# Patient Record
Sex: MALE | Race: WHITE | Employment: FULL TIME | URBAN - METROPOLITAN AREA
[De-identification: names, ages, dates, MRNs, and addresses within clinical notes are randomized per-mention and may not be internally consistent; named-entity substitution may affect disease eponyms.]

---

## 2024-11-01 ENCOUNTER — HOSPITAL ENCOUNTER (INPATIENT)
Age: 69
LOS: 1 days | Discharge: HOME | DRG: 812 | End: 2024-11-02
Attending: EMERGENCY MEDICINE | Admitting: INTERNAL MEDICINE
Payer: COMMERCIAL

## 2024-11-01 ENCOUNTER — APPOINTMENT (OUTPATIENT)
Dept: GENERAL RADIOLOGY | Age: 69
DRG: 812 | End: 2024-11-01
Payer: COMMERCIAL

## 2024-11-01 ENCOUNTER — APPOINTMENT (OUTPATIENT)
Dept: CT IMAGING | Age: 69
DRG: 812 | End: 2024-11-01
Payer: COMMERCIAL

## 2024-11-01 ENCOUNTER — APPOINTMENT (OUTPATIENT)
Dept: ULTRASOUND IMAGING | Age: 69
DRG: 812 | End: 2024-11-01
Payer: COMMERCIAL

## 2024-11-01 DIAGNOSIS — D64.9 ANEMIA, UNSPECIFIED TYPE: ICD-10-CM

## 2024-11-01 DIAGNOSIS — D69.6 THROMBOCYTOPENIA (HCC): Primary | ICD-10-CM

## 2024-11-01 LAB
ALBUMIN SERPL-MCNC: 4.8 G/DL (ref 3.5–5.2)
ALBUMIN/GLOB SERPL: 1.5 {RATIO} (ref 1–2.5)
ALP SERPL-CCNC: 64 U/L (ref 40–129)
ALT SERPL-CCNC: 23 U/L (ref 10–50)
ANION GAP SERPL CALCULATED.3IONS-SCNC: 15 MMOL/L (ref 9–16)
AST SERPL-CCNC: 19 U/L (ref 10–50)
BACTERIA URNS QL MICRO: NORMAL
BASOPHILS # BLD: 0.19 K/UL (ref 0–0.2)
BASOPHILS NFR BLD: 2 % (ref 0–2)
BILIRUB SERPL-MCNC: 0.5 MG/DL (ref 0–1.2)
BILIRUB UR QL STRIP: NEGATIVE
BUN SERPL-MCNC: 28 MG/DL (ref 8–23)
CALCIUM SERPL-MCNC: 9.5 MG/DL (ref 8.6–10.4)
CASTS #/AREA URNS LPF: NORMAL /LPF (ref 0–8)
CELLS COUNTED: 200
CHLORIDE SERPL-SCNC: 104 MMOL/L (ref 98–107)
CLARITY UR: CLEAR
CO2 SERPL-SCNC: 19 MMOL/L (ref 20–31)
COLOR UR: YELLOW
CREAT SERPL-MCNC: 1.2 MG/DL (ref 0.7–1.2)
EOSINOPHIL # BLD: 0.29 K/UL (ref 0–0.4)
EOSINOPHILS RELATIVE PERCENT: 3 % (ref 1–4)
EPI CELLS #/AREA URNS HPF: NORMAL /HPF (ref 0–5)
ERYTHROCYTE [DISTWIDTH] IN BLOOD BY AUTOMATED COUNT: 27.9 % (ref 11.8–14.4)
FERRITIN SERPL-MCNC: 381 NG/ML
FIBRINOGEN PPP-MCNC: 288 MG/DL (ref 203–521)
FOLATE SERPL-MCNC: 23.8 NG/ML (ref 4.8–24.2)
GFR, ESTIMATED: 66 ML/MIN/1.73M2
GLUCOSE SERPL-MCNC: 118 MG/DL (ref 74–99)
GLUCOSE UR STRIP-MCNC: NEGATIVE MG/DL
HAPTOGLOB SERPL-MCNC: 60 MG/DL (ref 30–200)
HCT VFR BLD AUTO: 20.6 % (ref 40.7–50.3)
HCV AB SERPL QL IA: NONREACTIVE
HGB BLD-MCNC: 6.7 G/DL (ref 13–17)
HGB UR QL STRIP.AUTO: NEGATIVE
HIV 1+2 AB+HIV1 P24 AG SERPL QL IA: NONREACTIVE
IMM GRANULOCYTES # BLD AUTO: 0.19 K/UL (ref 0–0.3)
IMM GRANULOCYTES NFR BLD: 2 %
IMM RETICS NFR: 8.8 % (ref 2.7–18.3)
INR PPP: 1.2
IRON SATN MFR SERPL: 35 % (ref 20–55)
IRON SERPL-MCNC: 80 UG/DL (ref 61–157)
KETONES UR STRIP-MCNC: NEGATIVE MG/DL
LDH SERPL-CCNC: 278 U/L (ref 135–225)
LEUKOCYTE ESTERASE UR QL STRIP: ABNORMAL
LYMPHOCYTES NFR BLD: 2.43 K/UL (ref 1–4.8)
LYMPHOCYTES RELATIVE PERCENT: 25 % (ref 24–44)
MCH RBC QN AUTO: 25.5 PG (ref 25.2–33.5)
MCHC RBC AUTO-ENTMCNC: 32.5 G/DL (ref 28.4–34.8)
MCV RBC AUTO: 78.3 FL (ref 82.6–102.9)
MONOCYTES NFR BLD: 0.19 K/UL (ref 0.1–0.8)
MONOCYTES NFR BLD: 2 % (ref 1–7)
MORPHOLOGY: ABNORMAL
NEUTROPHILS NFR BLD: 66 % (ref 36–66)
NEUTS SEG NFR BLD: 6.41 K/UL (ref 1.8–7.7)
NITRITE UR QL STRIP: NEGATIVE
NRBC BLD-RTO: 0 PER 100 WBC
PH UR STRIP: 5.5 [PH] (ref 5–8)
PLATELET # BLD AUTO: ABNORMAL K/UL (ref 138–453)
PLATELET, FLUORESCENCE: 19 K/UL (ref 138–453)
PLATELETS.RETICULATED NFR BLD AUTO: 17.8 % (ref 1.1–10.3)
POTASSIUM SERPL-SCNC: 4.2 MMOL/L (ref 3.7–5.3)
PROT SERPL-MCNC: 7.9 G/DL (ref 6.6–8.7)
PROT UR STRIP-MCNC: NEGATIVE MG/DL
PROTHROMBIN TIME: 15.4 SEC (ref 11.7–14.9)
RBC # BLD AUTO: 2.63 M/UL (ref 4.21–5.77)
RBC #/AREA URNS HPF: NORMAL /HPF (ref 0–4)
RETIC HEMOGLOBIN: 30.4 PG (ref 28.2–35.7)
RETICS # AUTO: <0.01 M/UL (ref 0.03–0.08)
RETICS/RBC NFR AUTO: 0.2 % (ref 0.5–1.9)
SODIUM SERPL-SCNC: 138 MMOL/L (ref 136–145)
SP GR UR STRIP: 1.02 (ref 1–1.03)
TIBC SERPL-MCNC: 231 UG/DL (ref 250–450)
TROPONIN I SERPL HS-MCNC: 9 NG/L (ref 0–22)
UNSATURATED IRON BINDING CAPACITY: 151 UG/DL (ref 112–347)
URATE SERPL-MCNC: 7.5 MG/DL (ref 3.4–7)
UROBILINOGEN UR STRIP-ACNC: NORMAL EU/DL (ref 0–1)
VIT B12 SERPL-MCNC: 555 PG/ML (ref 232–1245)
WBC #/AREA URNS HPF: NORMAL /HPF (ref 0–5)
WBC OTHER # BLD: 9.7 K/UL (ref 3.5–11.3)

## 2024-11-01 PROCEDURE — 70450 CT HEAD/BRAIN W/O DYE: CPT

## 2024-11-01 PROCEDURE — 85610 PROTHROMBIN TIME: CPT

## 2024-11-01 PROCEDURE — 83010 ASSAY OF HAPTOGLOBIN QUANT: CPT

## 2024-11-01 PROCEDURE — 86850 RBC ANTIBODY SCREEN: CPT

## 2024-11-01 PROCEDURE — 84550 ASSAY OF BLOOD/URIC ACID: CPT

## 2024-11-01 PROCEDURE — 84484 ASSAY OF TROPONIN QUANT: CPT

## 2024-11-01 PROCEDURE — 85055 RETICULATED PLATELET ASSAY: CPT

## 2024-11-01 PROCEDURE — 30233N1 TRANSFUSION OF NONAUTOLOGOUS RED BLOOD CELLS INTO PERIPHERAL VEIN, PERCUTANEOUS APPROACH: ICD-10-PCS | Performed by: EMERGENCY MEDICINE

## 2024-11-01 PROCEDURE — 85045 AUTOMATED RETICULOCYTE COUNT: CPT

## 2024-11-01 PROCEDURE — 82746 ASSAY OF FOLIC ACID SERUM: CPT

## 2024-11-01 PROCEDURE — 83615 LACTATE (LD) (LDH) ENZYME: CPT

## 2024-11-01 PROCEDURE — 86900 BLOOD TYPING SEROLOGIC ABO: CPT

## 2024-11-01 PROCEDURE — 82728 ASSAY OF FERRITIN: CPT

## 2024-11-01 PROCEDURE — 81001 URINALYSIS AUTO W/SCOPE: CPT

## 2024-11-01 PROCEDURE — 71046 X-RAY EXAM CHEST 2 VIEWS: CPT

## 2024-11-01 PROCEDURE — 83540 ASSAY OF IRON: CPT

## 2024-11-01 PROCEDURE — 99223 1ST HOSP IP/OBS HIGH 75: CPT | Performed by: INTERNAL MEDICINE

## 2024-11-01 PROCEDURE — 83550 IRON BINDING TEST: CPT

## 2024-11-01 PROCEDURE — 87389 HIV-1 AG W/HIV-1&-2 AB AG IA: CPT

## 2024-11-01 PROCEDURE — 93005 ELECTROCARDIOGRAM TRACING: CPT

## 2024-11-01 PROCEDURE — 99285 EMERGENCY DEPT VISIT HI MDM: CPT

## 2024-11-01 PROCEDURE — 86923 COMPATIBILITY TEST ELECTRIC: CPT

## 2024-11-01 PROCEDURE — P9016 RBC LEUKOCYTES REDUCED: HCPCS

## 2024-11-01 PROCEDURE — 88185 FLOWCYTOMETRY/TC ADD-ON: CPT

## 2024-11-01 PROCEDURE — 85384 FIBRINOGEN ACTIVITY: CPT

## 2024-11-01 PROCEDURE — 6360000002 HC RX W HCPCS: Performed by: INTERNAL MEDICINE

## 2024-11-01 PROCEDURE — 86803 HEPATITIS C AB TEST: CPT

## 2024-11-01 PROCEDURE — 85025 COMPLETE CBC W/AUTO DIFF WBC: CPT

## 2024-11-01 PROCEDURE — 86038 ANTINUCLEAR ANTIBODIES: CPT

## 2024-11-01 PROCEDURE — 99222 1ST HOSP IP/OBS MODERATE 55: CPT | Performed by: PHYSICIAN ASSISTANT

## 2024-11-01 PROCEDURE — 86225 DNA ANTIBODY NATIVE: CPT

## 2024-11-01 PROCEDURE — 76705 ECHO EXAM OF ABDOMEN: CPT

## 2024-11-01 PROCEDURE — 86901 BLOOD TYPING SEROLOGIC RH(D): CPT

## 2024-11-01 PROCEDURE — 2060000000 HC ICU INTERMEDIATE R&B

## 2024-11-01 PROCEDURE — 82607 VITAMIN B-12: CPT

## 2024-11-01 PROCEDURE — 88184 FLOWCYTOMETRY/ TC 1 MARKER: CPT

## 2024-11-01 PROCEDURE — 2580000003 HC RX 258: Performed by: PHYSICIAN ASSISTANT

## 2024-11-01 PROCEDURE — 80053 COMPREHEN METABOLIC PANEL: CPT

## 2024-11-01 RX ORDER — SODIUM CHLORIDE 9 MG/ML
INJECTION, SOLUTION INTRAVENOUS PRN
Status: DISCONTINUED | OUTPATIENT
Start: 2024-11-01 | End: 2024-11-02 | Stop reason: HOSPADM

## 2024-11-01 RX ORDER — ONDANSETRON 4 MG/1
4 TABLET, ORALLY DISINTEGRATING ORAL EVERY 8 HOURS PRN
Status: DISCONTINUED | OUTPATIENT
Start: 2024-11-01 | End: 2024-11-02 | Stop reason: HOSPADM

## 2024-11-01 RX ORDER — POTASSIUM CHLORIDE 1500 MG/1
40 TABLET, EXTENDED RELEASE ORAL PRN
Status: DISCONTINUED | OUTPATIENT
Start: 2024-11-01 | End: 2024-11-02 | Stop reason: HOSPADM

## 2024-11-01 RX ORDER — POLYETHYLENE GLYCOL 3350 17 G/17G
17 POWDER, FOR SOLUTION ORAL DAILY PRN
Status: DISCONTINUED | OUTPATIENT
Start: 2024-11-01 | End: 2024-11-02 | Stop reason: HOSPADM

## 2024-11-01 RX ORDER — ACETAMINOPHEN 325 MG/1
650 TABLET ORAL EVERY 6 HOURS PRN
Status: DISCONTINUED | OUTPATIENT
Start: 2024-11-01 | End: 2024-11-02 | Stop reason: HOSPADM

## 2024-11-01 RX ORDER — SODIUM CHLORIDE 0.9 % (FLUSH) 0.9 %
5-40 SYRINGE (ML) INJECTION EVERY 12 HOURS SCHEDULED
Status: DISCONTINUED | OUTPATIENT
Start: 2024-11-01 | End: 2024-11-02 | Stop reason: HOSPADM

## 2024-11-01 RX ORDER — ACETAMINOPHEN 650 MG/1
650 SUPPOSITORY RECTAL EVERY 6 HOURS PRN
Status: DISCONTINUED | OUTPATIENT
Start: 2024-11-01 | End: 2024-11-02 | Stop reason: HOSPADM

## 2024-11-01 RX ORDER — SODIUM CHLORIDE 0.9 % (FLUSH) 0.9 %
5-40 SYRINGE (ML) INJECTION PRN
Status: DISCONTINUED | OUTPATIENT
Start: 2024-11-01 | End: 2024-11-02 | Stop reason: HOSPADM

## 2024-11-01 RX ORDER — DEXAMETHASONE 4 MG/1
40 TABLET ORAL DAILY
Status: DISCONTINUED | OUTPATIENT
Start: 2024-11-01 | End: 2024-11-02 | Stop reason: HOSPADM

## 2024-11-01 RX ORDER — MAGNESIUM SULFATE IN WATER 40 MG/ML
2000 INJECTION, SOLUTION INTRAVENOUS PRN
Status: DISCONTINUED | OUTPATIENT
Start: 2024-11-01 | End: 2024-11-02 | Stop reason: HOSPADM

## 2024-11-01 RX ORDER — ONDANSETRON 2 MG/ML
4 INJECTION INTRAMUSCULAR; INTRAVENOUS EVERY 6 HOURS PRN
Status: DISCONTINUED | OUTPATIENT
Start: 2024-11-01 | End: 2024-11-02 | Stop reason: HOSPADM

## 2024-11-01 RX ORDER — POTASSIUM CHLORIDE 7.45 MG/ML
10 INJECTION INTRAVENOUS PRN
Status: DISCONTINUED | OUTPATIENT
Start: 2024-11-01 | End: 2024-11-02 | Stop reason: HOSPADM

## 2024-11-01 RX ADMIN — SODIUM CHLORIDE, PRESERVATIVE FREE 10 ML: 5 INJECTION INTRAVENOUS at 23:17

## 2024-11-01 RX ADMIN — DEXAMETHASONE 40 MG: 4 TABLET ORAL at 23:18

## 2024-11-01 ASSESSMENT — ENCOUNTER SYMPTOMS
VOMITING: 0
NAUSEA: 0
SHORTNESS OF BREATH: 1
ABDOMINAL PAIN: 0
COUGH: 0

## 2024-11-01 ASSESSMENT — PAIN - FUNCTIONAL ASSESSMENT: PAIN_FUNCTIONAL_ASSESSMENT: NONE - DENIES PAIN

## 2024-11-01 NOTE — ED PROVIDER NOTES
[facility] 4B STEPDOWN  Emergency Department Encounter  Emergency Medicine Resident     Pt Name:Osbaldo Duff  MRN: [id number]  Birthdate 1955  Date of evaluation: 11/1/24  PCP:  No primary care provider on file.  Note Started: 5:40 PM EDT      CHIEF COMPLAINT       Chief Complaint   Patient presents with    Fatigue       HISTORY OF PRESENT ILLNESS  (Location/Symptom, Timing/Onset, Context/Setting, Quality, Duration, Modifying Factors, Severity.)      Osbaldo Duff is a 69 y.o. male who presents with concern for fatigue, generalized malaise, shortness of breath for the past week.  Patient states that he has been trying over-the-counter cold and flu medication for his symptoms, which have not helped.  He states that he went to a [facility] physician today as a new patient, where he was found to have a hemoglobin of 6.5 and also thrombocytopenia with a platelet count of 18.  Patient denies any history of this.  Aside from his feelings of generalized malaise and exertional shortness of breath, he denies any other symptoms.  He also denies any source of bleeding, denies melena or hematochezia.  Denies any abdominal pain.  Denies any hematemesis or hemoptysis.  He denies any chest pain or shortness of breath.  He denies any known medical conditions, however he has not seen a physician in over 15 years.    PAST MEDICAL / SURGICAL / SOCIAL / FAMILY HISTORY      has no past medical history on file.       has no past surgical history on file.      Social History     Socioeconomic History    Marital status:      Spouse name: Not on file    Number of children: Not on file    Years of education: Not on file    Highest education level: Not on file   Occupational History    Not on file   Tobacco Use    Smoking status: Never    Smokeless tobacco: Never   Substance and Sexual Activity    Alcohol use: Never    Drug use: Never    Sexual activity: Yes   Other Topics Concern    Not on file   Social History Narrative    Not  MEDICATIONS:  Current Discharge Medication List          Shannon Mcguire MD  Emergency Medicine Resident    (Please note that portions of thisnote were completed with a voice recognition program.  Efforts were made to edit the dictations but occasionally words are mis-transcribed.)

## 2024-11-01 NOTE — ED NOTES
Pt to ed with family from home.   Pt is Brazilian speaking.  Pt had been c/o fatigue, headache for the past 7 days.   Pt denies chest pain, sob, difficulty breathing, abdominal pain, abnormal bleeding.   Pt went to a [place] physician today, had routine lab work. Pt hemoglobin was 6.5 and platelets 18. Pt was instructed to come to ED for blood transfusion.   Pt is pale. Pt is alert, oriented, speaking in full, complete sentences.   Pt has not been evaluated by doctor in 10 yrs, takes no prescribed medications.

## 2024-11-01 NOTE — CONSULTS
Today's Date: 11/1/2024  Patient Name: Osbaldo Duff  Date of admission: 11/1/2024  5:40 PM  Patient's age: 69 y.o., 1955  Admission Dx: No admission diagnoses are documented for this encounter.    Reason for Consult: management recommendations  Requesting Physician: No admitting provider for patient encounter.    CHIEF COMPLAINT: Fatigue.  Abnormal cell counts    History Obtained From:  patient, family member -daughter, electronic medical record    HISTORY OF PRESENT ILLNESS:      The patient is a 69 y.o. [place] male who admitted to the hospital due to abnormal lab workup.  History was obtained through an .  Patient understand very limited English.  Patient has not seen a doctor for a while.  Was seen by primary care provider due to complaints of fatigue also having low-grade fever.  Lab workup showed significantly abnormalities including severe thrombocytopenia and anemia subsequently patient sent to the ER.    Patient denies any weight loss swollen glands.  Does complain of having viral syndrome like symptoms over the last 1 week.  Denies noticing any bleeding.  Denies cough.  Patient does not smoke.  Drinks alcohol rarely.    Past Medical History: Hypertension    Past Surgical History:  No known pertinent surgical history    Medications:    Prior to Admission medications    Medication Sig Start Date End Date Taking? Authorizing Provider   Multiple Vitamin (MULTIVITAMIN ADULT PO) Take by mouth   Yes ProviderDexter MD     Current Facility-Administered Medications   Medication Dose Route Frequency Provider Last Rate Last Admin    0.9 % sodium chloride infusion   IntraVENous PRN Shannon Mcguire MD         Current Outpatient Medications   Medication Sig Dispense Refill    Multiple Vitamin (MULTIVITAMIN ADULT PO) Take by mouth         Allergies:  Patient has no known allergies.    Social History:   reports that he has never smoked. He has never used smokeless tobacco. He reports that he  patient.          Jack Gallego MD          This note is created with the assistance of a speech recognition program.  While intending to generate a document that actually reflects the content of the visit, the document can still have some errors including those of syntax and sound a like substitutions which may escape proof reading.  It such instances, actual meaning can be extrapolated by contextual diversion.

## 2024-11-01 NOTE — ED NOTES
Patient ambulatory to and from restroom with steady gait. Patient denies any dizziness, chest pain, shortness of breath with ambulation.

## 2024-11-01 NOTE — ED PROVIDER NOTES
[facility]  Emergency Department  Faculty Attestation     I performed a history and physical examination of the patient and discussed management with the resident. I reviewed the resident’s note and agree with the documented findings and plan of care. Any areas of disagreement are noted on the chart. I was personally present for the key portions of any procedures. I have documented in the chart those procedures where I was not present during the key portions. I have reviewed the emergency nurses triage note. I agree with the chief complaint, past medical history, past surgical history, allergies, medications, social and family history as documented unless otherwise noted below.    For Physician Assistant/ Nurse Practitioner cases/documentation I have personally evaluated this patient and have completed at least one if not all key elements of the E/M (history, physical exam, and MDM). Additional findings are as noted.    Preliminary note started at 6:01 PM EDT    Primary Care Physician:  No primary care provider on file.    Screenings:  [unfilled]    CHIEF COMPLAINT     No chief complaint on file.      RECENT VITALS:   There were no vitals taken for this visit.    LABS:  Labs Reviewed - No data to display    Radiology  No orders to display       CRITICAL CARE: There was a high probability of clinically significant/life threatening deterioration in this patient's condition which required my urgent intervention.  Total critical care time was none minutes.  This excludes any time for separately reportable procedures.     EKG:  EKG Interpretation    Interpreted by me    Rhythm: normal sinus   Rate: normal  Axis: normal  Ectopy: none  Conduction: normal  ST Segments: Subtle ST depression inferolaterally  T Waves: no acute change  Q Waves: none    Clinical Impression: Nonspecific ST changes    Attending Physician Additional  Notes    Patient is had at least 1 week if not more of

## 2024-11-01 NOTE — ED NOTES
Blood bank called requesting additional pink top tube with regular patient label for further type and screening. New pink top obtained, labeled, and sent to lab via tube system.

## 2024-11-02 VITALS
SYSTOLIC BLOOD PRESSURE: 130 MMHG | HEIGHT: 71 IN | WEIGHT: 180.56 LBS | DIASTOLIC BLOOD PRESSURE: 78 MMHG | TEMPERATURE: 98.1 F | HEART RATE: 84 BPM | BODY MASS INDEX: 25.28 KG/M2 | RESPIRATION RATE: 21 BRPM | OXYGEN SATURATION: 93 %

## 2024-11-02 LAB
ANION GAP SERPL CALCULATED.3IONS-SCNC: 12 MMOL/L (ref 9–16)
BASOPHILS # BLD: 0 K/UL (ref 0–0.2)
BASOPHILS NFR BLD: 0 % (ref 0–2)
BUN SERPL-MCNC: 25 MG/DL (ref 8–23)
CALCIUM SERPL-MCNC: 9.3 MG/DL (ref 8.6–10.4)
CHLORIDE SERPL-SCNC: 107 MMOL/L (ref 98–107)
CO2 SERPL-SCNC: 17 MMOL/L (ref 20–31)
CREAT SERPL-MCNC: 1.2 MG/DL (ref 0.7–1.2)
EKG ATRIAL RATE: 85 BPM
EKG P AXIS: 46 DEGREES
EKG P-R INTERVAL: 130 MS
EKG Q-T INTERVAL: 364 MS
EKG QRS DURATION: 86 MS
EKG QTC CALCULATION (BAZETT): 433 MS
EKG R AXIS: 61 DEGREES
EKG T AXIS: 64 DEGREES
EKG VENTRICULAR RATE: 85 BPM
EOSINOPHIL # BLD: 0 K/UL (ref 0–0.4)
EOSINOPHILS RELATIVE PERCENT: 0 % (ref 1–4)
ERYTHROCYTE [DISTWIDTH] IN BLOOD BY AUTOMATED COUNT: 24.1 % (ref 11.8–14.4)
GFR, ESTIMATED: 66 ML/MIN/1.73M2
GLUCOSE SERPL-MCNC: 191 MG/DL (ref 74–99)
HCT VFR BLD AUTO: 20.8 % (ref 40.7–50.3)
HCT VFR BLD AUTO: 25.5 % (ref 40.7–50.3)
HCT VFR BLD AUTO: 27.2 % (ref 40.7–50.3)
HGB BLD-MCNC: 6.8 G/DL (ref 13–17)
HGB BLD-MCNC: 8.6 G/DL (ref 13–17)
HGB BLD-MCNC: 8.7 G/DL (ref 13–17)
IMM GRANULOCYTES # BLD AUTO: 0.25 K/UL (ref 0–0.3)
IMM GRANULOCYTES NFR BLD: 3 %
LYMPHOCYTES NFR BLD: 0.58 K/UL (ref 1–4.8)
LYMPHOCYTES RELATIVE PERCENT: 7 % (ref 24–44)
MCH RBC QN AUTO: 26 PG (ref 25.2–33.5)
MCHC RBC AUTO-ENTMCNC: 32 G/DL (ref 28.4–34.8)
MCV RBC AUTO: 81.2 FL (ref 82.6–102.9)
MONOCYTES NFR BLD: 0.08 K/UL (ref 0.1–0.8)
MONOCYTES NFR BLD: 1 % (ref 1–7)
MORPHOLOGY: ABNORMAL
NEUTROPHILS NFR BLD: 89 % (ref 36–66)
NEUTS SEG NFR BLD: 7.39 K/UL (ref 1.8–7.7)
NRBC BLD-RTO: 0 PER 100 WBC
PLATELET # BLD AUTO: ABNORMAL K/UL (ref 138–453)
PLATELET, FLUORESCENCE: 16 K/UL (ref 138–453)
PLATELETS.RETICULATED NFR BLD AUTO: 16.5 % (ref 1.1–10.3)
POTASSIUM SERPL-SCNC: 4.3 MMOL/L (ref 3.7–5.3)
RBC # BLD AUTO: 3.35 M/UL (ref 4.21–5.77)
SODIUM SERPL-SCNC: 136 MMOL/L (ref 136–145)
WBC OTHER # BLD: 8.3 K/UL (ref 3.5–11.3)

## 2024-11-02 PROCEDURE — 99233 SBSQ HOSP IP/OBS HIGH 50: CPT | Performed by: INTERNAL MEDICINE

## 2024-11-02 PROCEDURE — 85025 COMPLETE CBC W/AUTO DIFF WBC: CPT

## 2024-11-02 PROCEDURE — 93010 ELECTROCARDIOGRAM REPORT: CPT | Performed by: INTERNAL MEDICINE

## 2024-11-02 PROCEDURE — 85018 HEMOGLOBIN: CPT

## 2024-11-02 PROCEDURE — 36415 COLL VENOUS BLD VENIPUNCTURE: CPT

## 2024-11-02 PROCEDURE — 85055 RETICULATED PLATELET ASSAY: CPT

## 2024-11-02 PROCEDURE — 85014 HEMATOCRIT: CPT

## 2024-11-02 PROCEDURE — 36430 TRANSFUSION BLD/BLD COMPNT: CPT

## 2024-11-02 PROCEDURE — 99238 HOSP IP/OBS DSCHRG MGMT 30/<: CPT | Performed by: STUDENT IN AN ORGANIZED HEALTH CARE EDUCATION/TRAINING PROGRAM

## 2024-11-02 PROCEDURE — 80048 BASIC METABOLIC PNL TOTAL CA: CPT

## 2024-11-02 PROCEDURE — 2580000003 HC RX 258: Performed by: PHYSICIAN ASSISTANT

## 2024-11-02 PROCEDURE — 6360000002 HC RX W HCPCS: Performed by: INTERNAL MEDICINE

## 2024-11-02 PROCEDURE — P9016 RBC LEUKOCYTES REDUCED: HCPCS

## 2024-11-02 RX ORDER — DEXAMETHASONE 0.5 MG/1
4 TABLET ORAL 2 TIMES DAILY
Qty: 112 TABLET | Refills: 0 | Status: SHIPPED | OUTPATIENT
Start: 2024-11-02 | End: 2024-11-09

## 2024-11-02 RX ORDER — SODIUM CHLORIDE 9 MG/ML
INJECTION, SOLUTION INTRAVENOUS PRN
Status: DISCONTINUED | OUTPATIENT
Start: 2024-11-02 | End: 2024-11-02 | Stop reason: HOSPADM

## 2024-11-02 RX ADMIN — DEXAMETHASONE 40 MG: 4 TABLET ORAL at 07:51

## 2024-11-02 RX ADMIN — SODIUM CHLORIDE, PRESERVATIVE FREE 10 ML: 5 INJECTION INTRAVENOUS at 07:54

## 2024-11-02 ASSESSMENT — ENCOUNTER SYMPTOMS
GASTROINTESTINAL NEGATIVE: 1
RESPIRATORY NEGATIVE: 1

## 2024-11-02 NOTE — DISCHARGE SUMMARY
[facility]  Office: [phone]  Carlos Ramírez DO, Jerry Washington DO, Keo Esquivel DO, Matthieu Steven DO, Janet Barr MD, Ivy Rome MD, Kimberlyn Jenkins MD, Deborah Dunbar MD,  Nick Garcia MD, Carlton Roth MD, Hellen Segura MD,  Cherry Dee DO, Carlos Doe MD, Cullen Damon MD, Alban Ramírez DO, Carol Wyatt MD,  Andrew Plata DO, Juanis Lees MD, Hui Laughlin MD, Mira Gee MD, Sonia Escobedo MD,  Gil Wheat MD, Chi Bang MD, Refugio Elliott MD, Livia Hinojosa MD, Sumit Michel MD, Mika Benjamin MD, Presley Hughes DO, Billy Pederson MD, Shirley Waterhouse, CNP,  Corin Kennedy, CNP, Presley Billy, CNP,  Sia Hancock, BESSY, Grace Romo, CNP, Nicole Vargas, CNP, Carolynn Mckinney, CNP, Clau Russell, CNP, Faye Siu PA-C, Elena Rosen PA-C, Mandy Juárez, CNP, Mc Woo, CNP,  Lupe Ramirez, CNP, Valeria Tai, CNP,  Niru Man, CNP, Janessa Carter, CNP         [facility]   IN-PATIENT SERVICE   [facility]    Discharge Summary     Patient ID: Osbaldo Duff  :  1955   MRN: [id number]     ACCOUNT:  [id number]   Patient's PCP: No primary care provider on file.  Admit Date: 2024   Discharge Date: 2024   Length of Stay: 1  Code Status:  Full Code  Admitting Physician: Matthieu Steven DO  Discharge Physician: Livia Hinojosa MD     Active Discharge Diagnoses:     Hospital Problem Lists:  Principal Problem:    Acute anemia  Active Problems:    Thrombocytopenia (HCC)  Resolved Problems:    * No resolved hospital problems. *      Admission Condition:  poor     Discharged Condition: good    Hospital Stay:     Hospital Course:     69 y.o. Non- / non  male who presents with Fatigue and is admitted to the hospital for the management of Acute anemia. Patient with no known medical history.      Information obtained via interpretor. Patient reports fatigue, general malaise,    dexAMETHasone 0.5 MG tablet         No discharge procedures on file.    Time Spent on discharge is  20 mins in patient examination, evaluation, counseling as well as medication reconciliation, prescriptions for required medications, discharge plan and follow up.    Electronically signed by   Livia Hinojosa MD  11/2/2024  4:20 PM      Thank you Dr. Sherman primary care provider on file. for the opportunity to be involved in this patient's care.

## 2024-11-02 NOTE — PROGRESS NOTES
0624. Writer notified Dr. Jack Gallego and NP Niru Man regarding patient's platelet this morning of 16. Patient has no active bleeding noted. Vitals were stable. Awaiting response.

## 2024-11-02 NOTE — ED NOTES
ED to inpatient nurses report      Chief Complaint:  Chief Complaint   Patient presents with    Fatigue     Present to ED from: home    MOA:     LOC: alert and orientated to name, place, date  Mobility: Requires assistance * 1  Oxygen Baseline: 100    Current needs required: none   Pending ED orders: none  Present condition: stable    Why did the patient come to the ED?     Pt to ed with family from home.   Pt is Belgian speaking.  Pt had been c/o fatigue, headache for the past 7 days.   Pt denies chest pain, sob, difficulty breathing, abdominal pain, abnormal bleeding.   Pt went to a [place] physician today, had routine lab work. Pt hemoglobin was 6.5 and platelets 18. Pt was instructed to come to ED for blood transfusion.   Pt is pale. Pt is alert, oriented, speaking in full, complete sentences.   Pt has not been evaluated by doctor in 10 yrs, takes no prescribed medications.      What is the plan? Admit for thrombocytopenia and Anemia  Any procedures or intervention occur? Meds, labs, imaging  Any safety concerns??    Mental Status:  Level of Consciousness: Alert (0)    Psych Assessment:   Psychosocial  Psychosocial (WDL): Within Defined Limits  Vital signs   Vitals:    11/01/24 2020 11/01/24 2025 11/01/24 2030 11/01/24 2035   BP: 138/74 137/68 138/67 (!) 145/73   Pulse: 81 81 80 82   Resp:   21 22   Temp:  98.3 °F (36.8 °C) 98.4 °F (36.9 °C) 98.9 °F (37.2 °C)   TempSrc:       SpO2: 100% 100% 100% 100%   Weight:       Height:            Vitals:  Patient Vitals for the past 24 hrs:   BP Temp Temp src Pulse Resp SpO2 Height Weight   11/01/24 2035 (!) 145/73 98.9 °F (37.2 °C) -- 82 22 100 % -- --   11/01/24 2030 138/67 98.4 °F (36.9 °C) -- 80 21 100 % -- --   11/01/24 2025 137/68 98.3 °F (36.8 °C) -- 81 -- 100 % -- --   11/01/24 2020 138/74 -- -- 81 -- 100 % -- --   11/01/24 2017 (!) 138/59 98.2 °F (36.8 °C) -- 82 18 100 % -- --   11/01/24 1845 139/77 -- -- 79 -- 100 % -- --   11/01/24 1842 -- -- -- 85 -- 100 % --

## 2024-11-02 NOTE — PROGRESS NOTES
[facility]  Office: [phone]  Carlos Ramírez DO, Jerry Washington DO, Keo Esquivel DO, Matthieu Steven DO, Janet Barr MD, Ivy Rome MD, Kimberlyn Jenkins MD, Deborah Dunbar MD,  Nick Garcia MD, Carlton Roth MD, Hellen Segura MD,  Cherry Dee DO, Carlos Doe MD, Cullen Damon MD, Alban Ramírez DO, Carol Wyatt MD,  Andrew Plata DO, Juanis Lees MD, Hui Laughlin MD, Mira Gee MD, Sonia Escobedo MD,  Gil Wheat MD, Chi Bang MD, Refugio Elliott MD, Livia Hinojosa MD, Sumit Michel MD, Mika Benjamin MD, Presley Hughes DO, Billy Pederson MD, Shirley Waterhouse, CNP,  Corin Kennedy CNP, Presley Billy, JIMENEZ,  Sia Hancock, BESSY, Grace Romo, CNP, Nicole Vargas, CNP, Carolynn Mckinney, CNP, Clau Russell, CNP, GÉNESIS ZambranoC, GÉNESIS UpC, Mandy Juárez, CNP, Mc Woo, CNP,  Lupe Ramirez, CNP, Valeria Tai, CNP,  Niru Man, CNP, Janessa Carter, CNP         [facility]   IN-PATIENT SERVICE   [facility]    Progress Note    11/2/2024    12:05 PM    Name:   Osbaldo Duff  MRN:     [id number]     Acct:      [id number]   Room:   0417/0417-02   Day:  1  Admit Date:  11/1/2024  5:40 PM    PCP:   No primary care provider on file.  Code Status:  Full Code    Subjective:     C/C:   Chief Complaint   Patient presents with    Fatigue     Interval History Status: not changed.   Patient was seen and examined, at time of my evaluation his daughter and wife at bedside, his daughter help with translation, at time of my evaluation patient continues to report fatigue, weakness however he denies any complaint, he denies any active bleeding  Lab vital signs reviewed, discussed with the patient and his family      Review of Systems:     Review of Systems   Constitutional:  Positive for fatigue.   HENT: Negative.     Respiratory: Negative.     Cardiovascular: Negative.    Gastrointestinal: Negative.    Genitourinary:  present.      Mental Status: He is alert and oriented to person, place, and time.      Sensory: No sensory deficit.      Motor: No weakness.   Psychiatric:         Mood and Affect: Mood normal.         Assessment:        Hospital Problems             Last Modified POA    * (Principal) Acute anemia 11/1/2024 Yes    Thrombocytopenia (HCC) 11/1/2024 Yes       Plan:        Severe thrombocytopenia present on admission, and patient was started on steroid Decadron, hematology oncology is following appreciate input, will continue to monitor platelet will consider transfuse platelets if patient has actively bleeding and if platelets below 10,  Anemia, status post 1 unit blood transfusion, hemoglobin stable today with last check 8.7 will continue to monitor and will transfuse hemoglobin less than 7  Compression device for DVT prophylaxis  Will continue to monitor and will follow final hematology oncology input  Discussed with the patient and with his family daughter and wife at bedside    Livia Hinojosa MD  11/2/2024  12:05 PM

## 2024-11-02 NOTE — ED NOTES
The following labs were labeled with appropriate pt sticker and tubed to lab:     [] Blue     [x] Lavender   [] on ice  [x] Green/yellow  [x] Green/black [x] on ice  [] Grey  [] on ice  [x] Yellow  [] Red  [] Pink  [] Type/ Screen  [] ABG  [] VBG    [] COVID-19 swab    [] Rapid  [] PCR  [] Flu swab  [] Peds Viral Panel     [] Urine Sample  [] Fecal Sample  [] Pelvic Cultures  [] Blood Cultures  [] X 2  [] STREP Cultures  [] Wound Cultures

## 2024-11-02 NOTE — PROGRESS NOTES
2300. Patient's daughter refused Covid 19 rapid to his father because she stated that they tested her father at home for covid yesterday and resulted as negative. Writer notified MOOSE Carter,no new order made.

## 2024-11-02 NOTE — DISCHARGE INSTRUCTIONS
Please continue to take your medication as prescribed, follow-up with hematology oncology doctor after discharge to recheck your platelet and hemoglobin level and for further workup

## 2024-11-02 NOTE — H&P
Basophils % 2 0 - 2 %    Immature Granulocytes Absolute 0.19 0.00 - 0.30 k/uL    Neutrophils Absolute 6.41 1.8 - 7.7 k/uL    Lymphocytes Absolute 2.43 1.0 - 4.8 k/uL    Monocytes Absolute 0.19 0.1 - 0.8 k/uL    Eosinophils Absolute 0.29 0.0 - 0.4 k/uL    Basophils Absolute 0.19 0.0 - 0.2 k/uL    Cells Counted 200     Morphology 1+ SCHISTOCYTES     Morphology 1+ ELLIPTOCYTES     Morphology ANISOCYTOSIS PRESENT     Morphology HYPOCHROMIA PRESENT    CMP    Collection Time: 11/01/24  6:25 PM   Result Value Ref Range    Sodium 138 136 - 145 mmol/L    Potassium 4.2 3.7 - 5.3 mmol/L    Chloride 104 98 - 107 mmol/L    CO2 19 (L) 20 - 31 mmol/L    Anion Gap 15 9 - 16 mmol/L    Glucose 118 (H) 74 - 99 mg/dL    BUN 28 (H) 8 - 23 mg/dL    Creatinine 1.2 0.7 - 1.2 mg/dL    Est, Glom Filt Rate 66 >60 mL/min/1.73m2    Calcium 9.5 8.6 - 10.4 mg/dL    Total Protein 7.9 6.6 - 8.7 g/dL    Albumin 4.8 3.5 - 5.2 g/dL    Albumin/Globulin Ratio 1.5 1.0 - 2.5    Total Bilirubin 0.5 0.0 - 1.2 mg/dL    Alkaline Phosphatase 64 40 - 129 U/L    ALT 23 10 - 50 U/L    AST 19 10 - 50 U/L   Troponin    Collection Time: 11/01/24  6:25 PM   Result Value Ref Range    Troponin, High Sensitivity 9 0 - 22 ng/L   Protime-INR    Collection Time: 11/01/24  6:25 PM   Result Value Ref Range    Protime 15.4 (H) 11.7 - 14.9 sec    INR 1.2    Iron and TIBC    Collection Time: 11/01/24  6:25 PM   Result Value Ref Range    Iron 80 61 - 157 ug/dL    TIBC 231 (L) 250 - 450 ug/dL    Iron % Saturation 35 20 - 55 %    UIBC 151 112 - 347 ug/dL   Ferritin    Collection Time: 11/01/24  6:25 PM   Result Value Ref Range    Ferritin 381 ng/mL   Fibrinogen    Collection Time: 11/01/24  6:25 PM   Result Value Ref Range    Fibrinogen 288 203 - 521 mg/dL   Lactate Dehydrogenase    Collection Time: 11/01/24  6:25 PM   Result Value Ref Range     (H) 135 - 225 U/L   Uric Acid    Collection Time: 11/01/24  6:25 PM   Result Value Ref Range    Uric Acid 7.5 (H) 3.4 - 7.0  mg/dL   Reticulocytes    Collection Time: 11/01/24  6:25 PM   Result Value Ref Range    Retic Ct Pct 0.2 (L) 0.5 - 1.9 %    Absolute Retic # <0.010 (L) 0.030 - 0.080 M/uL    Immature Retic Fract 8.8 2.7 - 18.3 %    Retic Hemoglobin 30.4 28.2 - 35.7 pg   TYPE AND SCREEN    Collection Time: 11/01/24  6:26 PM   Result Value Ref Range    Blood Bank Sample Expiration 11/04/2024,2359     Arm Band Number [id number]     ABO/Rh O POSITIVE     Antibody Screen NEGATIVE     Unit Number [id number]     Component Leukocyte Reduced Red Cell     Unit Divison 00     Dispense Status Blood Bank ISSUED     Unit Issue Date/Time 202411012008     Product Code Blood Bank [id number]     Blood Bank Unit Type and Rh O POS     Blood Bank ISBT Product Blood Type 5100     Blood Bank Blood Product Expiration Date 822733616351     Transfusion Status OK TO TRANSFUSE     Crossmatch Result COMPATIBLE    Urinalysis with Reflex to Culture    Collection Time: 11/01/24  7:20 PM    Specimen: Urine   Result Value Ref Range    Color, UA Yellow Yellow    Turbidity UA Clear Clear    Glucose, Ur NEGATIVE NEGATIVE mg/dL    Bilirubin, Urine NEGATIVE NEGATIVE    Ketones, Urine NEGATIVE NEGATIVE mg/dL    Specific Gravity, UA 1.017 1.005 - 1.030    Urine Hgb NEGATIVE NEGATIVE    pH, Urine 5.5 5.0 - 8.0    Protein, UA NEGATIVE NEGATIVE mg/dL    Urobilinogen, Urine Normal 0.0 - 1.0 EU/dL    Nitrite, Urine NEGATIVE NEGATIVE    Leukocyte Esterase, Urine TRACE (A) NEGATIVE   Microscopic Urinalysis    Collection Time: 11/01/24  7:20 PM   Result Value Ref Range    WBC, UA 2 TO 5 0 - 5 /HPF    RBC, UA None 0 - 4 /HPF    Casts UA  0 - 8 /LPF     None Reference range defined for non-centrifuged specimen.    Epithelial Cells, UA 0 TO 2 0 - 5 /HPF    Bacteria, UA None None       Imaging/Diagnostics:  CT HEAD WO CONTRAST    Result Date: 11/1/2024  1.  No acute intracranial hemorrhage or acute cerebral infarction identified. 2.  Complete opacification of left maxillary sinus.

## 2024-11-02 NOTE — CONSENT
Informed Consent for Blood Component Transfusion Note    Using a bedside video , I have discussed with the patient the rationale for blood component transfusion; its benefits in treating or preventing fatigue, organ damage, or death; and its risk which includes mild transfusion reactions, rare risk of blood borne infection, or more serious but rare reactions. I have discussed the alternatives to transfusion, including the risk and consequences of not receiving transfusion. The patient had an opportunity to ask questions and had agreed to proceed with transfusion of blood components.    Electronically signed by Shannon Mcguire MD on 11/1/24 at 8:50 PM EDT

## 2024-11-02 NOTE — PLAN OF CARE
Problem: Discharge Planning  Goal: Discharge to home or other facility with appropriate resources  11/2/2024 0917 by Kristan Wilkins, RN  Outcome: Progressing  Flowsheets (Taken 11/2/2024 0745)  Discharge to home or other facility with appropriate resources:   Arrange for needed discharge resources and transportation as appropriate   Identify barriers to discharge with patient and caregiver   Identify discharge learning needs (meds, wound care, etc)   Arrange for interpreters to assist at discharge as needed   Refer to discharge planning if patient needs post-hospital services based on physician order or complex needs related to functional status, cognitive ability or social support system  11/1/2024 1937 by Ivette Wilcox, RN  Outcome: Progressing

## 2024-11-02 NOTE — PROGRESS NOTES
Today's Date: 11/2/2024  Patient Name: Osbaldo Duff  Date of admission: 11/1/2024  5:40 PM  Patient's age: 69 y.o., 1955  Admission Dx: Thrombocytopenia (HCC) [D69.6]  Anemia, unspecified type [D64.9]  Acute anemia [D64.9]    Reason for Consult: management recommendations  Requesting Physician: Matthieu Steven DO    CHIEF COMPLAINT: Fatigue.  Abnormal cell counts    Interval history  Patient is seen and evaluated.  He feels well and has no complaints.  He specifically denies any fever chills night sweats or weight loss.  He has been tolerating steroids well    HISTORY OF PRESENT ILLNESS:      The patient is a 69 y.o. Taiwanese male who admitted to the hospital due to abnormal lab workup.  History was obtained through an .  Patient understand very limited English.  Patient has not seen a doctor for a while.  Was seen by primary care provider due to complaints of fatigue also having low-grade fever.  Lab workup showed significantly abnormalities including severe thrombocytopenia and anemia subsequently patient sent to the ER.    Patient denies any weight loss swollen glands.  Does complain of having viral syndrome like symptoms over the last 1 week.  Denies noticing any bleeding.  Denies cough.  Patient does not smoke.  Drinks alcohol rarely.    Past Medical History: Hypertension    Past Surgical History:  No known pertinent surgical history    Medications:    Prior to Admission medications    Medication Sig Start Date End Date Taking? Authorizing Provider   dexAMETHasone (DECADRON) 0.5 MG tablet Take 8 tablets by mouth in the morning and at bedtime for 7 days 11/2/24 11/9/24 Yes Livia Hinojosa MD   Multiple Vitamin (MULTIVITAMIN ADULT PO) Take by mouth   Yes ProviderDexter MD     Current Facility-Administered Medications   Medication Dose Route Frequency Provider Last Rate Last Admin    0.9 % sodium chloride infusion   IntraVENous PRN Waterhouse, Shirley Ann, APRTAPAN - CNP         ms    Q-T Interval 364 ms    QTc Calculation (Bazett) 433 ms    P Axis 46 degrees    R Axis 61 degrees    T Axis 64 degrees   TYPE AND SCREEN   Result Value Ref Range    Blood Bank Sample Expiration 11/04/2024,2359     Arm Band Number [id number]     ABO/Rh O POSITIVE     Antibody Screen NEGATIVE     Unit Number [id number]     Component Leukocyte Reduced Red Cell     Unit Divison 00     Dispense Status Blood Bank TRANSFUSED     Unit Issue Date/Time 601553621874     Product Code Blood Bank [id number]     Blood Bank Unit Type and Rh O POS     Blood Bank ISBT Product Blood Type 5100     Blood Bank Blood Product Expiration Date 202411252359     Transfusion Status OK TO TRANSFUSE     Crossmatch Result COMPATIBLE     Unit Number [id number]     Component Leukocyte Reduced Red Cell     Unit Divison 00     Dispense Status Blood Bank ISSUED     Unit Issue Date/Time 184026071991     Product Code Blood Bank [id number]     Blood Bank Unit Type and Rh O POS     Blood Bank ISBT Product Blood Type 5100     Blood Bank Blood Product Expiration Date 202411252359     Transfusion Status OK TO TRANSFUSE     Crossmatch Result COMPATIBLE          IMAGING DATA:    XR CHEST (2 VW)    Result Date: 11/1/2024  EXAMINATION: TWO XRAY VIEWS OF THE CHEST 11/1/2024 6:53 pm COMPARISON: Or HISTORY: ORDERING SYSTEM PROVIDED HISTORY: shortness of breath TECHNOLOGIST PROVIDED HISTORY: shortness of breath FINDINGS: The cardiomediastinal silhouette is normal size.No consolidation or venous congestion.No pleural effusion or pneumothorax identified.     No acute cardiopulmonary disease.         IMPRESSION:   Primary Problem  Chief Complaint   Patient presents with    Fatigue           Severe thrombocytopenia  Microcytic anemia  Petechial rash  Headache    RECOMMENDATIONS:  I reviewed the labs/imaging available to me,outside records and discussed with the patient.I explained to the patient the nature of this problem. I explained the significance of these

## 2024-11-03 LAB
ABO/RH: NORMAL
ANTIBODY SCREEN: NEGATIVE
ARM BAND NUMBER: NORMAL
BLOOD BANK BLOOD PRODUCT EXPIRATION DATE: NORMAL
BLOOD BANK BLOOD PRODUCT EXPIRATION DATE: NORMAL
BLOOD BANK DISPENSE STATUS: NORMAL
BLOOD BANK DISPENSE STATUS: NORMAL
BLOOD BANK ISBT PRODUCT BLOOD TYPE: 5100
BLOOD BANK ISBT PRODUCT BLOOD TYPE: 5100
BLOOD BANK PRODUCT CODE: NORMAL
BLOOD BANK PRODUCT CODE: NORMAL
BLOOD BANK SAMPLE EXPIRATION: NORMAL
BLOOD BANK UNIT TYPE AND RH: NORMAL
BLOOD BANK UNIT TYPE AND RH: NORMAL
BPU ID: NORMAL
BPU ID: NORMAL
COMPONENT: NORMAL
COMPONENT: NORMAL
CROSSMATCH RESULT: NORMAL
CROSSMATCH RESULT: NORMAL
TRANSFUSION STATUS: NORMAL
TRANSFUSION STATUS: NORMAL
UNIT DIVISION: 0
UNIT DIVISION: 0
UNIT ISSUE DATE/TIME: NORMAL
UNIT ISSUE DATE/TIME: NORMAL

## 2024-11-04 ENCOUNTER — HOSPITAL ENCOUNTER (OUTPATIENT)
Age: 69
Setting detail: SPECIMEN
Discharge: HOME | End: 2024-11-04
Payer: COMMERCIAL

## 2024-11-04 ENCOUNTER — OFFICE VISIT (OUTPATIENT)
Dept: ONCOLOGY | Age: 69
End: 2024-11-04
Payer: COMMERCIAL

## 2024-11-04 ENCOUNTER — HOSPITAL ENCOUNTER (OUTPATIENT)
Dept: INFUSION THERAPY | Facility: MEDICAL CENTER | Age: 69
Discharge: HOME | End: 2024-11-04
Payer: COMMERCIAL

## 2024-11-04 ENCOUNTER — HOSPITAL ENCOUNTER (OUTPATIENT)
Facility: MEDICAL CENTER | Age: 69
End: 2024-11-04
Payer: COMMERCIAL

## 2024-11-04 ENCOUNTER — TELEPHONE (OUTPATIENT)
Dept: ONCOLOGY | Age: 69
End: 2024-11-04

## 2024-11-04 VITALS
RESPIRATION RATE: 16 BRPM | SYSTOLIC BLOOD PRESSURE: 126 MMHG | HEART RATE: 64 BPM | TEMPERATURE: 98.2 F | DIASTOLIC BLOOD PRESSURE: 74 MMHG

## 2024-11-04 VITALS
BODY MASS INDEX: 24.85 KG/M2 | DIASTOLIC BLOOD PRESSURE: 69 MMHG | TEMPERATURE: 97 F | SYSTOLIC BLOOD PRESSURE: 139 MMHG | HEART RATE: 69 BPM | RESPIRATION RATE: 16 BRPM | WEIGHT: 177.5 LBS

## 2024-11-04 DIAGNOSIS — D69.6 THROMBOCYTOPENIA (HCC): Primary | ICD-10-CM

## 2024-11-04 DIAGNOSIS — D64.9 ACUTE ANEMIA: ICD-10-CM

## 2024-11-04 DIAGNOSIS — D69.6 THROMBOCYTOPENIA (HCC): ICD-10-CM

## 2024-11-04 LAB
BASOPHILS # BLD: 0 K/UL (ref 0–0.2)
BASOPHILS NFR BLD: 0 % (ref 0–2)
EOSINOPHIL # BLD: 0 K/UL (ref 0–0.44)
EOSINOPHILS RELATIVE PERCENT: 0 % (ref 1–4)
ERYTHROCYTE [DISTWIDTH] IN BLOOD BY AUTOMATED COUNT: 24.1 % (ref 11.8–14.4)
HCT VFR BLD AUTO: 25.6 % (ref 40.7–50.3)
HGB BLD-MCNC: 8.7 G/DL (ref 13–17)
IMM GRANULOCYTES # BLD AUTO: 0.28 K/UL (ref 0–0.3)
IMM GRANULOCYTES NFR BLD: 2 %
LYMPHOCYTES NFR BLD: 1.28 K/UL (ref 1.1–3.7)
LYMPHOCYTES RELATIVE PERCENT: 9 % (ref 24–43)
MCH RBC QN AUTO: 26.9 PG (ref 25.2–33.5)
MCHC RBC AUTO-ENTMCNC: 34 G/DL (ref 28.4–34.8)
MCV RBC AUTO: 79.3 FL (ref 82.6–102.9)
MONOCYTES NFR BLD: 0.43 K/UL (ref 0.1–1.2)
MONOCYTES NFR BLD: 3 % (ref 3–12)
MORPHOLOGY: ABNORMAL
MORPHOLOGY: ABNORMAL
NEUTROPHILS NFR BLD: 86 % (ref 36–65)
NEUTS SEG NFR BLD: 12.21 K/UL (ref 1.5–8.1)
NRBC BLD-RTO: 0 PER 100 WBC
PLATELET # BLD AUTO: ABNORMAL K/UL (ref 138–453)
PLATELET, FLUORESCENCE: 14 K/UL (ref 138–453)
PLATELETS.RETICULATED NFR BLD AUTO: 22.2 % (ref 1.1–10.3)
PMV BLD AUTO: ABNORMAL FL (ref 8.1–13.5)
RBC # BLD AUTO: 3.23 M/UL (ref 4.21–5.77)
SURGICAL PATHOLOGY REPORT: NORMAL
WBC OTHER # BLD: 14.2 K/UL (ref 3.5–11.3)

## 2024-11-04 PROCEDURE — 85055 RETICULATED PLATELET ASSAY: CPT

## 2024-11-04 PROCEDURE — 99211 OFF/OP EST MAY X REQ PHY/QHP: CPT | Performed by: INTERNAL MEDICINE

## 2024-11-04 PROCEDURE — 36415 COLL VENOUS BLD VENIPUNCTURE: CPT

## 2024-11-04 PROCEDURE — 2580000003 HC RX 258: Performed by: INTERNAL MEDICINE

## 2024-11-04 PROCEDURE — 85025 COMPLETE CBC W/AUTO DIFF WBC: CPT

## 2024-11-04 PROCEDURE — 36430 TRANSFUSION BLD/BLD COMPNT: CPT

## 2024-11-04 PROCEDURE — 99215 OFFICE O/P EST HI 40 MIN: CPT | Performed by: INTERNAL MEDICINE

## 2024-11-04 PROCEDURE — 1123F ACP DISCUSS/DSCN MKR DOCD: CPT | Performed by: INTERNAL MEDICINE

## 2024-11-04 PROCEDURE — P9073 PLATELETS PHERESIS PATH REDU: HCPCS

## 2024-11-04 RX ORDER — SODIUM CHLORIDE 9 MG/ML
INJECTION, SOLUTION INTRAVENOUS PRN
Status: COMPLETED | OUTPATIENT
Start: 2024-11-04 | End: 2024-11-04

## 2024-11-04 RX ADMIN — SODIUM CHLORIDE: 9 INJECTION, SOLUTION INTRAVENOUS at 13:14

## 2024-11-04 NOTE — PROGRESS NOTES
Pt seen per md and PLT CT 14 and pt to treatment area with son for platelets.  NS flushing line before and after and pt tolerated platelets well and no reactions or complaints and lab does have pt T&C on file and consent signed prior.  Vital signs stable and pt discharged per amb with son and given AVS from  with next appts.

## 2024-11-04 NOTE — PROGRESS NOTES
Osbaldo Duff                                                                                                                  11/4/2024  MRN:   [id number]  YOB: 1955  PCP:                           No primary care provider on file.  Referring Physician: No ref. provider found  Treating Physician Name: CELIA HONG MD      Reason for visit:  Chief Complaint   Patient presents with    Follow-Up from Hospital    Discuss Labs     Current problems:  Thrombocytopenia  Microcytic anemia    Active and recent treatments:  Anticipating bone marrow biopsy  Blood product support    Summary of Case/History:  Osbaldo Duff a 69 y.o.male is a patient who admitted to the hospital due to abnormal lab workup.  History was obtained through an .  Patient understand very limited English.  Patient has not seen a doctor for a while.  Was seen by primary care provider due to complaints of fatigue also having low-grade fever.  Lab workup showed significantly abnormalities including severe thrombocytopenia and anemia subsequently patient sent to the ER.     Patient denies any weight loss swollen glands.  Does complain of having viral syndrome like symptoms over the last 1 week.  Denies noticing any bleeding.  Denies cough.  Patient does not smoke.  Drinks alcohol rarely.     Interim History:  Presents to the clinic for a posthospital discharge follow-up visit.  Patient did not have a good response to steroids remains thrombocytopenic.  Platelet count today is 14,000.  Denies noticing any bleeding or spontaneous bruising.  Patient's son-in-law who accompanied the patient is an visit translated during the appointment    During this visit patient's allergy, social, medical, surgical history and medications were reviewed and updated.    Past Medical History:   Hypertension    Past Surgical History:  No pertinent surgical history    Patient Family Social History:  No family history on file.    Social

## 2024-11-04 NOTE — PATIENT INSTRUCTIONS
Transfuse 1 unit plt today   Cbc every Monday and thrsday   Transfuse plt if plt 10 or less  Transfuse prbc if hb 7 or less      Bone marrow biopsy urgent , rv after bone marrow biopsy       Needs not to be off from work for 2 week

## 2024-11-04 NOTE — TELEPHONE ENCOUNTER
VALERIA HERE FOR MD VISIT  Transfuse 1 unit plt today   Cbc every Monday and thrsday   Transfuse plt if plt 10 or less  Transfuse prbc if hb 7 or less  Bone marrow biopsy urgent , rv after bone marrow biopsy   Needs not to be off from work for 2 week   1 UNIT OF PRBC WAS DONE ON EXIT  CBC IS TO BE DONE EVERY MONDAY & THURSDAY ORDER GIVEN TO PT ON EXIT  IR BM BX IS ON 11/11/24 @ 2PM AT [facility] ARRIVAL @ 12:30PM  MD VISIT 11/18/24 @ 1:15PM  AVS PRINTED W/ INSTRUCTIONS AND GIVEN TO PT ON EXIT

## 2024-11-05 LAB
ANA SER QL IA: NEGATIVE
BLOOD BANK DISPENSE STATUS: NORMAL
BPU ID: NORMAL
COMPONENT: NORMAL
DSDNA IGG SER QL IA: 1.1 IU/ML
FLOW CYTOMETRY BL: NORMAL
NUCLEAR IGG SER IA-RTO: 0.6 U/ML
TRANSFUSION STATUS: NORMAL
UNIT DIVISION: 0

## 2024-11-07 ENCOUNTER — HOSPITAL ENCOUNTER (OUTPATIENT)
Age: 69
Discharge: HOME | End: 2024-11-07

## 2024-11-07 ENCOUNTER — TELEPHONE (OUTPATIENT)
Dept: INFUSION THERAPY | Facility: MEDICAL CENTER | Age: 69
End: 2024-11-07

## 2024-11-07 DIAGNOSIS — D69.6 THROMBOCYTOPENIA (HCC): ICD-10-CM

## 2024-11-07 DIAGNOSIS — D64.9 ACUTE ANEMIA: ICD-10-CM

## 2024-11-07 LAB
BASOPHILS # BLD: 0 K/UL (ref 0–0.2)
BASOPHILS NFR BLD: 0 %
EOSINOPHIL # BLD: 0.12 K/UL (ref 0–0.4)
EOSINOPHILS RELATIVE PERCENT: 1 % (ref 1–4)
ERYTHROCYTE [DISTWIDTH] IN BLOOD BY AUTOMATED COUNT: 24.3 % (ref 11.8–14.4)
HCT VFR BLD AUTO: 26.6 % (ref 40.7–50.3)
HGB BLD-MCNC: 8.8 G/DL (ref 13–17)
IMM GRANULOCYTES # BLD AUTO: 0.58 K/UL (ref 0–0.3)
IMM GRANULOCYTES NFR BLD: 5 %
LYMPHOCYTES NFR BLD: 1.84 K/UL (ref 1–4.8)
LYMPHOCYTES RELATIVE PERCENT: 16 % (ref 24–44)
MCH RBC QN AUTO: 26.7 PG (ref 25.2–33.5)
MCHC RBC AUTO-ENTMCNC: 33.1 G/DL (ref 28.4–34.8)
MCV RBC AUTO: 80.6 FL (ref 82.6–102.9)
MONOCYTES NFR BLD: 0.46 K/UL (ref 0.2–0.8)
MONOCYTES NFR BLD: 4 % (ref 1–7)
MORPHOLOGY: ABNORMAL
MORPHOLOGY: ABNORMAL
NEUTROPHILS NFR BLD: 74 % (ref 36–66)
NEUTS SEG NFR BLD: 8.5 K/UL (ref 1.8–7.7)
NRBC BLD-RTO: 0 PER 100 WBC
PLATELET # BLD AUTO: ABNORMAL K/UL (ref 138–453)
PLATELET, FLUORESCENCE: 22 K/UL (ref 138–453)
PLATELETS.RETICULATED NFR BLD AUTO: 17 % (ref 1.1–10.3)
PMV BLD AUTO: ABNORMAL FL (ref 8.1–13.5)
RBC # BLD AUTO: 3.3 M/UL (ref 4.21–5.77)
WBC OTHER # BLD: 11.5 K/UL (ref 3.5–11.3)

## 2024-11-07 PROCEDURE — 36415 COLL VENOUS BLD VENIPUNCTURE: CPT

## 2024-11-07 PROCEDURE — 85025 COMPLETE CBC W/AUTO DIFF WBC: CPT

## 2024-11-07 PROCEDURE — 85055 RETICULATED PLATELET ASSAY: CPT

## 2024-11-07 NOTE — TELEPHONE ENCOUNTER
Patient arrives to  lobby after getting blood work for possible transfusion.  Hgb 8.8, plt 22; patient will not need transfused today.  Patient denies any bleeding.  Patient will be at [facility] on Monday for bone marrow biopsy; will come next Thursday 11/14 for possible transfusion. They verbalize understanding.  Patient son in law asking when patient is to stop steroids; writer will review chart and let them know.  Patient discharged with copy of labs in hand.    Writer called and spoke to Deya (listed contact) and notified her to instruct patient to finish steroid prescription per instructions. She verbalizes understanding and will notify patient.

## 2024-11-11 ENCOUNTER — HOSPITAL ENCOUNTER (OUTPATIENT)
Age: 69
Setting detail: SPECIMEN
Discharge: HOME | End: 2024-11-11
Payer: COMMERCIAL

## 2024-11-11 ENCOUNTER — HOSPITAL ENCOUNTER (OUTPATIENT)
Dept: CT IMAGING | Age: 69
Discharge: HOME | End: 2024-11-13
Payer: COMMERCIAL

## 2024-11-11 ENCOUNTER — TELEPHONE (OUTPATIENT)
Dept: INFUSION THERAPY | Age: 69
End: 2024-11-11

## 2024-11-11 ENCOUNTER — HOSPITAL ENCOUNTER (OUTPATIENT)
Facility: MEDICAL CENTER | Age: 69
End: 2024-11-11
Payer: COMMERCIAL

## 2024-11-11 VITALS
HEART RATE: 62 BPM | RESPIRATION RATE: 12 BRPM | TEMPERATURE: 96.8 F | DIASTOLIC BLOOD PRESSURE: 71 MMHG | OXYGEN SATURATION: 100 % | SYSTOLIC BLOOD PRESSURE: 128 MMHG

## 2024-11-11 DIAGNOSIS — D69.6 THROMBOCYTOPENIA (HCC): ICD-10-CM

## 2024-11-11 DIAGNOSIS — D64.9 ACUTE ANEMIA: ICD-10-CM

## 2024-11-11 DIAGNOSIS — D64.9 ACUTE ANEMIA: Primary | ICD-10-CM

## 2024-11-11 LAB
BASOPHILS # BLD: 0.08 K/UL (ref 0–0.2)
BASOPHILS NFR BLD: 1 %
EOSINOPHIL # BLD: 0.24 K/UL (ref 0–0.4)
EOSINOPHILS RELATIVE PERCENT: 3 % (ref 1–4)
ERYTHROCYTE [DISTWIDTH] IN BLOOD BY AUTOMATED COUNT: 24.3 % (ref 11.8–14.4)
HCT VFR BLD AUTO: 25.3 % (ref 40.7–50.3)
HGB BLD-MCNC: 8 G/DL (ref 13–17)
IMM GRANULOCYTES # BLD AUTO: 0.56 K/UL (ref 0–0.3)
IMM GRANULOCYTES NFR BLD: 7 %
INR PPP: 1.3
LYMPHOCYTES NFR BLD: 3.28 K/UL (ref 1–4.8)
LYMPHOCYTES RELATIVE PERCENT: 41 % (ref 24–44)
MCH RBC QN AUTO: 26.8 PG (ref 25.2–33.5)
MCHC RBC AUTO-ENTMCNC: 31.6 G/DL (ref 28.4–34.8)
MCV RBC AUTO: 84.9 FL (ref 82.6–102.9)
MONOCYTES NFR BLD: 0.32 K/UL (ref 0.2–0.8)
MONOCYTES NFR BLD: 4 % (ref 1–7)
MORPHOLOGY: ABNORMAL
MORPHOLOGY: ABNORMAL
NEUTROPHILS NFR BLD: 44 % (ref 36–66)
NEUTS SEG NFR BLD: 3.52 K/UL (ref 1.8–7.7)
NRBC BLD-RTO: 0 PER 100 WBC
PARTIAL THROMBOPLASTIN TIME: 29.5 SEC (ref 23–36.5)
PLATELET # BLD AUTO: ABNORMAL K/UL (ref 138–453)
PLATELET # BLD AUTO: NORMAL K/UL (ref 138–453)
PLATELET, FLUORESCENCE: 14 K/UL (ref 138–453)
PLATELET, FLUORESCENCE: 14 K/UL (ref 138–453)
PLATELETS.RETICULATED NFR BLD AUTO: 23.5 % (ref 1.1–10.3)
PLATELETS.RETICULATED NFR BLD AUTO: 26.4 % (ref 1.1–10.3)
PMV BLD AUTO: ABNORMAL FL (ref 8.1–13.5)
PROTHROMBIN TIME: 16 SEC (ref 11.7–14.9)
RBC # BLD AUTO: 2.98 M/UL (ref 4.21–5.77)
WBC OTHER # BLD: 8 K/UL (ref 3.5–11.3)

## 2024-11-11 PROCEDURE — 88280 CHROMOSOME KARYOTYPE STUDY: CPT

## 2024-11-11 PROCEDURE — 85610 PROTHROMBIN TIME: CPT

## 2024-11-11 PROCEDURE — 36415 COLL VENOUS BLD VENIPUNCTURE: CPT

## 2024-11-11 PROCEDURE — 88264 CHROMOSOME ANALYSIS 20-25: CPT

## 2024-11-11 PROCEDURE — 88237 TISSUE CULTURE BONE MARROW: CPT

## 2024-11-11 PROCEDURE — 85025 COMPLETE CBC W/AUTO DIFF WBC: CPT

## 2024-11-11 PROCEDURE — 6360000002 HC RX W HCPCS: Performed by: RADIOLOGY

## 2024-11-11 PROCEDURE — 85055 RETICULATED PLATELET ASSAY: CPT

## 2024-11-11 PROCEDURE — 85049 AUTOMATED PLATELET COUNT: CPT

## 2024-11-11 PROCEDURE — 7100000041 HC SPAR PHASE II RECOVERY - ADDTL 15 MIN: Performed by: RADIOLOGY

## 2024-11-11 PROCEDURE — 7100000040 HC SPAR PHASE II RECOVERY - FIRST 15 MIN: Performed by: RADIOLOGY

## 2024-11-11 PROCEDURE — 2709999900 CT BIOPSY BONE MARROW

## 2024-11-11 PROCEDURE — 85730 THROMBOPLASTIN TIME PARTIAL: CPT

## 2024-11-11 RX ORDER — FENTANYL CITRATE 50 UG/ML
INJECTION, SOLUTION INTRAMUSCULAR; INTRAVENOUS PRN
Status: COMPLETED | OUTPATIENT
Start: 2024-11-11 | End: 2024-11-11

## 2024-11-11 RX ORDER — MIDAZOLAM HYDROCHLORIDE 2 MG/2ML
INJECTION, SOLUTION INTRAMUSCULAR; INTRAVENOUS PRN
Status: COMPLETED | OUTPATIENT
Start: 2024-11-11 | End: 2024-11-11

## 2024-11-11 RX ORDER — SODIUM CHLORIDE 9 MG/ML
INJECTION, SOLUTION INTRAVENOUS CONTINUOUS
Status: DISCONTINUED | OUTPATIENT
Start: 2024-11-11 | End: 2024-11-14 | Stop reason: HOSPADM

## 2024-11-11 RX ORDER — SODIUM CHLORIDE 9 MG/ML
INJECTION, SOLUTION INTRAVENOUS PRN
Status: DISCONTINUED | OUTPATIENT
Start: 2024-11-11 | End: 2024-11-13 | Stop reason: HOSPADM

## 2024-11-11 RX ADMIN — MIDAZOLAM HYDROCHLORIDE 1 MG: 1 INJECTION, SOLUTION INTRAMUSCULAR; INTRAVENOUS at 14:55

## 2024-11-11 RX ADMIN — FENTANYL CITRATE 50 MCG: 50 INJECTION, SOLUTION INTRAMUSCULAR; INTRAVENOUS at 15:03

## 2024-11-11 ASSESSMENT — PAIN - FUNCTIONAL ASSESSMENT: PAIN_FUNCTIONAL_ASSESSMENT: 0-10

## 2024-11-11 NOTE — H&P
History and Physical    Pt Name: Osbaldo Duff  MRN: [id number]  YOB: 1955  Date of evaluation: 11/11/2024    SUBJECTIVE:   History of Chief Complaint:    Patient presents preprocedure for bone marrow biopsy.  He is present with his daughter today, also  used as patient speaks primarily Belarusian.  Patient has been being worked up for thrombocytopenia and low hemoglobin.  Patient reports some fatigue in the past week or so, shortness of breath on exertion.  He denies any easy bruising/bleeding.  He has been scheduled for biopsy today.  Past Medical History    has a past medical history of Anemia, Thrombocytopenia (HCC), and Wears dentures.  Past Surgical History   has a past surgical history that includes Hand surgery and Inguinal hernia repair.  Medications  Prior to Admission medications    Medication Sig Start Date End Date Taking? Authorizing Provider   Multiple Vitamin (MULTIVITAMIN ADULT PO) Take by mouth   Yes Provider, MD Dexter     Allergies  has No Known Allergies.  Family History  family history includes Stroke in his father and mother.  Social History   reports that he has never smoked. He has never used smokeless tobacco.   reports that he does not currently use alcohol.   reports no history of drug use.  Marital Status     Review of Systems:  CONSTITUTIONAL:   negative for fevers, chills, malaise; positive for fatigue    EYES:   negative for double vision, blurred vision and photophobia    HEENT:   negative for tinnitus, epistaxis and sore throat     RESPIRATORY:   negative for cough; positive for shortness of breath on exertion     CARDIOVASCULAR:   negative for chest pain, palpitations, syncope, edema     GASTROINTESTINAL:   negative for nausea, vomiting     GENITOURINARY/RENAL:   negative for incontinence     MUSCULOSKELETAL:   negative for neck or back pain     NEUROLOGICAL:   Negative for weakness and tingling  negative for headaches and dizziness     PSYCHIATRIC:

## 2024-11-11 NOTE — OR NURSING
Hematology staff in room. Bone marrow aspiration for bx obtained from left iliac crest by Dr Sanabria, handed to hematology in room.

## 2024-11-11 NOTE — BRIEF OP NOTE
Brief Postoperative Note for Bone Marrow Biopsy    Osbaldo Duff  YOB: 1955  [id number]    Pre-operative Diagnosis: Anemia     Post-operative Diagnosis: Same     Procedure: Left posterior iliac bone marrow aspiration with and without heparin & biopsy     Anesthesia: 1% lidocaine and moderate sedation     Surgeons/Assistants: MD Daniele     Estimated Blood Loss: Minimal       Complications: None     Specimens Were Obtained: from the Left posterior iliac spine using an Arrow Oncontrol Device 11 g x 4 in. Bone marrow aspirate was collected. A bone biopsy was also obtained. Specimens were received by hem/onc at the time of collection. Vital signs were reviewed and were stable after the procedure.     Electronically signed by LYNN Schaffer on 11/11/2024 at 3:24 PM

## 2024-11-11 NOTE — POST SEDATION
Sedation Post Procedure Note    Patient Name: Osbaldo Duff   YOB: 1955  Room/Bed: Room/bed info not found  Medical Record Number: [id number]  Date: 11/11/2024   Time: 3:23 PM         Physicians/Assistants: LYNN Schaffer    Procedure Performed:  BMBx    Post-Sedation Vital Signs:  Vitals:    11/11/24 1510   BP: 113/63   Pulse: 66   Resp: 19   Temp:    SpO2: 99%      Vital signs were reviewed and were stable after the procedure (see flow sheet for vitals)            Post-Sedation Exam: pt remains stable           Complications: none    Electronically signed by LYNN Schaffer on 11/11/2024 at 3:23 PM

## 2024-11-12 ENCOUNTER — HOSPITAL ENCOUNTER (OUTPATIENT)
Dept: INFUSION THERAPY | Facility: MEDICAL CENTER | Age: 69
Discharge: HOME | End: 2024-11-12
Payer: COMMERCIAL

## 2024-11-12 VITALS
OXYGEN SATURATION: 100 % | RESPIRATION RATE: 14 BRPM | HEART RATE: 72 BPM | TEMPERATURE: 98.7 F | SYSTOLIC BLOOD PRESSURE: 121 MMHG | DIASTOLIC BLOOD PRESSURE: 66 MMHG

## 2024-11-12 PROCEDURE — P9073 PLATELETS PHERESIS PATH REDU: HCPCS

## 2024-11-12 PROCEDURE — 36430 TRANSFUSION BLD/BLD COMPNT: CPT

## 2024-11-12 NOTE — PROGRESS NOTES
Blood consent in chart:Patient was a difficult IV start, Vlad BROWN ( house supervisor) placed IV with ultrasound guidance. Once IV access was established patient received entire bag of platelets. Patient did not have any adverse reactions. Patient tolerated infusion well. IV removed and patient discharged with family and belongings..

## 2024-11-13 LAB
BLOOD BANK DISPENSE STATUS: NORMAL
BPU ID: NORMAL
COMPONENT: NORMAL
TRANSFUSION STATUS: NORMAL
UNIT DIVISION: 0

## 2024-11-14 ENCOUNTER — HOSPITAL ENCOUNTER (OUTPATIENT)
Dept: INFUSION THERAPY | Facility: MEDICAL CENTER | Age: 69
Discharge: HOME | End: 2024-11-14
Payer: COMMERCIAL

## 2024-11-14 ENCOUNTER — HOSPITAL ENCOUNTER (OUTPATIENT)
Facility: MEDICAL CENTER | Age: 69
Discharge: HOME | End: 2024-11-14
Payer: COMMERCIAL

## 2024-11-14 VITALS
HEART RATE: 76 BPM | TEMPERATURE: 98.3 F | OXYGEN SATURATION: 99 % | RESPIRATION RATE: 16 BRPM | SYSTOLIC BLOOD PRESSURE: 127 MMHG | DIASTOLIC BLOOD PRESSURE: 80 MMHG

## 2024-11-14 LAB
ERYTHROCYTE [DISTWIDTH] IN BLOOD BY AUTOMATED COUNT: 24.1 % (ref 11.8–14.4)
HCT VFR BLD AUTO: 22.1 % (ref 40.7–50.3)
HGB BLD-MCNC: 7.3 G/DL (ref 13–17)
MCH RBC QN AUTO: 26.7 PG (ref 25.2–33.5)
MCHC RBC AUTO-ENTMCNC: 33 G/DL (ref 28.4–34.8)
MCV RBC AUTO: 81 FL (ref 82.6–102.9)
NRBC BLD-RTO: 0 PER 100 WBC
PLATELET # BLD AUTO: ABNORMAL K/UL (ref 138–453)
PLATELET, FLUORESCENCE: 12 K/UL (ref 138–453)
PLATELETS.RETICULATED NFR BLD AUTO: 24.7 % (ref 1.1–10.3)
PMV BLD AUTO: ABNORMAL FL (ref 8.1–13.5)
RBC # BLD AUTO: 2.73 M/UL (ref 4.21–5.77)
WBC OTHER # BLD: 7.5 K/UL (ref 3.5–11.3)

## 2024-11-14 PROCEDURE — 85027 COMPLETE CBC AUTOMATED: CPT

## 2024-11-14 PROCEDURE — 85055 RETICULATED PLATELET ASSAY: CPT

## 2024-11-14 PROCEDURE — 36415 COLL VENOUS BLD VENIPUNCTURE: CPT

## 2024-11-14 PROCEDURE — 2580000003 HC RX 258: Performed by: INTERNAL MEDICINE

## 2024-11-14 PROCEDURE — 36430 TRANSFUSION BLD/BLD COMPNT: CPT

## 2024-11-14 PROCEDURE — P9073 PLATELETS PHERESIS PATH REDU: HCPCS

## 2024-11-14 RX ORDER — SODIUM CHLORIDE 9 MG/ML
INJECTION, SOLUTION INTRAVENOUS PRN
Status: COMPLETED | OUTPATIENT
Start: 2024-11-14 | End: 2024-11-14

## 2024-11-14 RX ADMIN — SODIUM CHLORIDE: 9 INJECTION, SOLUTION INTRAVENOUS at 11:19

## 2024-11-14 NOTE — PROGRESS NOTES
Pt to waiting area and PLT CT 12 and HGB 7.3, transfuse blood for HGB< 7 and pt to treatment area for plts if indicated per md.  Per md note pt to be transfused with 1 unit platelets for PLT CT < 10, but pt has received platelets x 2 for 14 plt ct.  Message sent to Dr Gallego and req plts given today.  IR to room for IV, Dora, # 20 to rt upper arm and NS flushing line before and after plts, see flow sheet for vital signs , family member with pt and no reactions or complaints and pt to return Mondays and Thursdays.  Pt discharged per amb with family member.

## 2024-11-15 LAB
BLOOD BANK DISPENSE STATUS: NORMAL
BPU ID: NORMAL
COMPONENT: NORMAL
MISCELLANEOUS LAB TEST RESULT: NORMAL
TEST NAME: NORMAL
TRANSFUSION STATUS: NORMAL
UNIT DIVISION: 0

## 2024-11-18 ENCOUNTER — TELEPHONE (OUTPATIENT)
Dept: ONCOLOGY | Age: 69
End: 2024-11-18

## 2024-11-18 ENCOUNTER — HOSPITAL ENCOUNTER (OUTPATIENT)
Age: 69
Setting detail: SPECIMEN
Discharge: HOME | End: 2024-11-18
Payer: COMMERCIAL

## 2024-11-18 ENCOUNTER — HOSPITAL ENCOUNTER (OUTPATIENT)
Dept: INFUSION THERAPY | Facility: MEDICAL CENTER | Age: 69
Discharge: HOME | End: 2024-11-18
Payer: COMMERCIAL

## 2024-11-18 ENCOUNTER — OFFICE VISIT (OUTPATIENT)
Dept: ONCOLOGY | Age: 69
End: 2024-11-18
Payer: COMMERCIAL

## 2024-11-18 VITALS
RESPIRATION RATE: 16 BRPM | DIASTOLIC BLOOD PRESSURE: 68 MMHG | HEART RATE: 86 BPM | SYSTOLIC BLOOD PRESSURE: 111 MMHG | TEMPERATURE: 98.4 F

## 2024-11-18 VITALS
TEMPERATURE: 98.7 F | RESPIRATION RATE: 16 BRPM | SYSTOLIC BLOOD PRESSURE: 124 MMHG | HEART RATE: 81 BPM | DIASTOLIC BLOOD PRESSURE: 76 MMHG

## 2024-11-18 DIAGNOSIS — D46.9 MDS (MYELODYSPLASTIC SYNDROME) (HCC): Primary | ICD-10-CM

## 2024-11-18 DIAGNOSIS — D69.6 THROMBOCYTOPENIA (HCC): ICD-10-CM

## 2024-11-18 DIAGNOSIS — D64.9 ACUTE ANEMIA: ICD-10-CM

## 2024-11-18 LAB
BASOPHILS # BLD: 0 K/UL (ref 0–0.2)
BASOPHILS NFR BLD: 0 %
EOSINOPHIL # BLD: 0.29 K/UL (ref 0–0.4)
EOSINOPHILS RELATIVE PERCENT: 4 % (ref 1–4)
ERYTHROCYTE [DISTWIDTH] IN BLOOD BY AUTOMATED COUNT: 23.9 % (ref 11.8–14.4)
HCT VFR BLD AUTO: 21 % (ref 40.7–50.3)
HGB BLD-MCNC: 6.9 G/DL (ref 13–17)
IMM GRANULOCYTES # BLD AUTO: 0.51 K/UL (ref 0–0.3)
IMM GRANULOCYTES NFR BLD: 7 %
LYMPHOCYTES NFR BLD: 2.04 K/UL (ref 1–4.8)
LYMPHOCYTES RELATIVE PERCENT: 28 % (ref 24–44)
MCH RBC QN AUTO: 26.2 PG (ref 25.2–33.5)
MCHC RBC AUTO-ENTMCNC: 32.9 G/DL (ref 28.4–34.8)
MCV RBC AUTO: 79.8 FL (ref 82.6–102.9)
MONOCYTES NFR BLD: 0.29 K/UL (ref 0.2–0.8)
MONOCYTES NFR BLD: 4 % (ref 1–7)
MORPHOLOGY: ABNORMAL
MORPHOLOGY: ABNORMAL
NEUTROPHILS NFR BLD: 57 % (ref 36–66)
NEUTS SEG NFR BLD: 4.17 K/UL (ref 1.8–7.7)
NRBC BLD-RTO: 0 PER 100 WBC
PLATELET # BLD AUTO: ABNORMAL K/UL (ref 138–453)
PLATELET, FLUORESCENCE: 9 K/UL (ref 138–453)
PLATELETS.RETICULATED NFR BLD AUTO: 27.6 % (ref 1.1–10.3)
PMV BLD AUTO: ABNORMAL FL (ref 8.1–13.5)
RBC # BLD AUTO: 2.63 M/UL (ref 4.21–5.77)
WBC OTHER # BLD: 7.3 K/UL (ref 3.5–11.3)

## 2024-11-18 PROCEDURE — P9016 RBC LEUKOCYTES REDUCED: HCPCS

## 2024-11-18 PROCEDURE — 36430 TRANSFUSION BLD/BLD COMPNT: CPT

## 2024-11-18 PROCEDURE — 86850 RBC ANTIBODY SCREEN: CPT

## 2024-11-18 PROCEDURE — 86901 BLOOD TYPING SEROLOGIC RH(D): CPT

## 2024-11-18 PROCEDURE — 86900 BLOOD TYPING SEROLOGIC ABO: CPT

## 2024-11-18 PROCEDURE — 99211 OFF/OP EST MAY X REQ PHY/QHP: CPT | Performed by: INTERNAL MEDICINE

## 2024-11-18 PROCEDURE — 85025 COMPLETE CBC W/AUTO DIFF WBC: CPT

## 2024-11-18 PROCEDURE — 99215 OFFICE O/P EST HI 40 MIN: CPT | Performed by: INTERNAL MEDICINE

## 2024-11-18 PROCEDURE — P9073 PLATELETS PHERESIS PATH REDU: HCPCS

## 2024-11-18 PROCEDURE — 36415 COLL VENOUS BLD VENIPUNCTURE: CPT

## 2024-11-18 PROCEDURE — 85055 RETICULATED PLATELET ASSAY: CPT

## 2024-11-18 PROCEDURE — 86920 COMPATIBILITY TEST SPIN: CPT

## 2024-11-18 PROCEDURE — 1123F ACP DISCUSS/DSCN MKR DOCD: CPT | Performed by: INTERNAL MEDICINE

## 2024-11-18 RX ORDER — SODIUM CHLORIDE 9 MG/ML
INJECTION, SOLUTION INTRAVENOUS PRN
Status: DISCONTINUED | OUTPATIENT
Start: 2024-11-18 | End: 2024-11-19 | Stop reason: HOSPADM

## 2024-11-18 NOTE — PROGRESS NOTES
Osbaldo Duff                                                                                                                  11/18/2024  MRN:   [id number]  YOB: 1955  PCP:                           No primary care provider on file.  Referring Physician: No ref. 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  History was obtained through an .  Patient understand very limited English.  Patient has not seen a doctor for a while.  Was seen by primary care provider due to complaints of fatigue also having low-grade fever.  Lab workup showed significantly abnormalities including severe thrombocytopenia and anemia subsequently patient sent to the ER.     Patient denies any weight loss swollen glands.  Does complain of having viral syndrome like symptoms over the last 1 week.  Denies noticing any bleeding.  Denies cough.  Patient does not smoke.  Drinks alcohol rarely.     Interim History:  Patient presents to the clinic for a follow-up visit and to discuss further treatment plan.  Underwent bone marrow biopsy without any complications.  Bone marrow final report is not available.  Specimen has been sent to [facility] for a second opinion.  Patient continues to require blood products periodically.  Complains of being tired.  Has not been able to work     During this visit patient's allergy, social, medical, surgical history and medications were reviewed and updated.    Past Medical History:

## 2024-11-18 NOTE — PROGRESS NOTES
Patient arrives ambulatory with family for blood and platelet transfusions.  Patient denies new concerns or complaints.  Vitals as charted.  Labs reviewed.  MD in to see patient.  IV initiated per protocol by Cass BORWN.  1 unit PRBC infused with no issues, line flushed while monitoring patient.  1 unit platelets infused with no issues; line flushed for 30 minutes while monitoring patient.  See flowsheet for stable vitals and titration.  Writer placed nurse navigation consult in; explained to family.   Intact IV removed; pressure dressing applied.  Patient discharged with instructions to grab AVS in front office.

## 2024-11-18 NOTE — TELEPHONE ENCOUNTER
VALERIA HERE FOR FOLLOW UP & TX   NO WRITTEN INSTRUCTIONS GIVEN   PER DR. HONG HE WILL SEE PT NEXT WK W/ TRANSFUSION   MD VISIT: 11/25/24 @ 9:45AM TX @ 9AM   AVS PRINTED AND GIVEN ON EXIT

## 2024-11-19 ENCOUNTER — HOSPITAL ENCOUNTER (OUTPATIENT)
Facility: MEDICAL CENTER | Age: 69
End: 2024-11-19

## 2024-11-20 LAB — CHROMOSOME STUDY: NORMAL

## 2024-11-21 ENCOUNTER — HOSPITAL ENCOUNTER (INPATIENT)
Age: 69
LOS: 27 days | Discharge: HOME | DRG: 834 | End: 2024-12-18
Attending: EMERGENCY MEDICINE
Payer: COMMERCIAL

## 2024-11-21 ENCOUNTER — TELEPHONE (OUTPATIENT)
Dept: INFUSION THERAPY | Age: 69
End: 2024-11-21

## 2024-11-21 ENCOUNTER — HOSPITAL ENCOUNTER (OUTPATIENT)
Facility: MEDICAL CENTER | Age: 69
Discharge: HOME | End: 2024-11-21
Payer: COMMERCIAL

## 2024-11-21 ENCOUNTER — HOSPITAL ENCOUNTER (OUTPATIENT)
Dept: INFUSION THERAPY | Facility: MEDICAL CENTER | Age: 69
Discharge: HOME | End: 2024-11-21
Payer: COMMERCIAL

## 2024-11-21 VITALS
OXYGEN SATURATION: 98 % | DIASTOLIC BLOOD PRESSURE: 72 MMHG | TEMPERATURE: 98.3 F | RESPIRATION RATE: 16 BRPM | SYSTOLIC BLOOD PRESSURE: 121 MMHG | HEART RATE: 84 BPM

## 2024-11-21 DIAGNOSIS — R50.81 NEUTROPENIA WITH FEVER (HCC): ICD-10-CM

## 2024-11-21 DIAGNOSIS — D70.9 AGRANULOCYTOSIS (HCC): ICD-10-CM

## 2024-11-21 DIAGNOSIS — C95.00 ACUTE LEUKEMIA NOT HAVING ACHIEVED REMISSION (HCC): Primary | ICD-10-CM

## 2024-11-21 DIAGNOSIS — D70.9 NEUTROPENIA WITH FEVER (HCC): ICD-10-CM

## 2024-11-21 PROBLEM — I10 HTN (HYPERTENSION): Status: ACTIVE | Noted: 2024-11-21

## 2024-11-21 PROBLEM — C95.02: Status: ACTIVE | Noted: 2024-11-21

## 2024-11-21 LAB
ABO/RH: NORMAL
ALBUMIN SERPL-MCNC: 4.4 G/DL (ref 3.5–5.2)
ALBUMIN/GLOB SERPL: 1.7 {RATIO} (ref 1–2.5)
ALP SERPL-CCNC: 60 U/L (ref 40–129)
ALT SERPL-CCNC: 19 U/L (ref 10–50)
ANION GAP SERPL CALCULATED.3IONS-SCNC: 13 MMOL/L (ref 9–16)
ANTIBODY SCREEN: NEGATIVE
ARM BAND NUMBER: NORMAL
AST SERPL-CCNC: 16 U/L (ref 10–50)
BASOPHILS # BLD: 0 K/UL (ref 0–0.2)
BASOPHILS NFR BLD: 0 % (ref 0–2)
BILIRUB DIRECT SERPL-MCNC: 0.2 MG/DL (ref 0–0.2)
BILIRUB INDIRECT SERPL-MCNC: 0.3 MG/DL (ref 0–1)
BILIRUB SERPL-MCNC: 0.5 MG/DL (ref 0–1.2)
BLOOD BANK DISPENSE STATUS: NORMAL
BLOOD BANK DISPENSE STATUS: NORMAL
BLOOD BANK SAMPLE EXPIRATION: NORMAL
BONE MARROW REPORT: NORMAL
BPU ID: NORMAL
BPU ID: NORMAL
BUN SERPL-MCNC: 22 MG/DL (ref 8–23)
CALCIUM SERPL-MCNC: 9.1 MG/DL (ref 8.6–10.4)
CHLORIDE SERPL-SCNC: 102 MMOL/L (ref 98–107)
CO2 SERPL-SCNC: 22 MMOL/L (ref 20–31)
COMPONENT: NORMAL
COMPONENT: NORMAL
CREAT SERPL-MCNC: 1.4 MG/DL (ref 0.7–1.2)
CROSSMATCH RESULT: NORMAL
EOSINOPHIL # BLD: 0.22 K/UL (ref 0–0.4)
EOSINOPHILS RELATIVE PERCENT: 3 % (ref 1–4)
ERYTHROCYTE [DISTWIDTH] IN BLOOD BY AUTOMATED COUNT: 22.2 % (ref 11.8–14.4)
ERYTHROCYTE [DISTWIDTH] IN BLOOD BY AUTOMATED COUNT: 22.3 % (ref 11.8–14.4)
FIBRINOGEN PPP-MCNC: 311 MG/DL (ref 203–521)
GFR, ESTIMATED: 55 ML/MIN/1.73M2
GLOBULIN SER CALC-MCNC: 2.6 G/DL
GLUCOSE SERPL-MCNC: 151 MG/DL (ref 74–99)
HAPTOGLOB SERPL-MCNC: 74 MG/DL (ref 30–200)
HAV IGM SERPL QL IA: NONREACTIVE
HBV CORE IGM SERPL QL IA: NONREACTIVE
HBV SURFACE AG SERPL QL IA: NONREACTIVE
HCT VFR BLD AUTO: 21.7 % (ref 40.7–50.3)
HCT VFR BLD AUTO: 22 % (ref 40.7–50.3)
HCV AB SERPL QL IA: NONREACTIVE
HGB BLD-MCNC: 7.1 G/DL (ref 13–17)
HGB BLD-MCNC: 7.2 G/DL (ref 13–17)
IMM GRANULOCYTES # BLD AUTO: 0.29 K/UL (ref 0–0.3)
IMM GRANULOCYTES NFR BLD: 4 %
IMM RETICS NFR: 0 % (ref 2.7–18.3)
INR PPP: 1.3
LDH SERPL-CCNC: 374 U/L (ref 135–225)
LYMPHOCYTES NFR BLD: 1.53 K/UL (ref 1–4.8)
LYMPHOCYTES RELATIVE PERCENT: 21 % (ref 24–44)
MCH RBC QN AUTO: 26.6 PG (ref 25.2–33.5)
MCH RBC QN AUTO: 27.5 PG (ref 25.2–33.5)
MCHC RBC AUTO-ENTMCNC: 32.3 G/DL (ref 28.4–34.8)
MCHC RBC AUTO-ENTMCNC: 33.2 G/DL (ref 28.4–34.8)
MCV RBC AUTO: 82.4 FL (ref 82.6–102.9)
MCV RBC AUTO: 82.8 FL (ref 82.6–102.9)
MONOCYTES NFR BLD: 0.15 K/UL (ref 0.1–0.8)
MONOCYTES NFR BLD: 2 % (ref 1–7)
MORPHOLOGY: ABNORMAL
NEUTROPHILS NFR BLD: 70 % (ref 36–66)
NEUTS SEG NFR BLD: 5.11 K/UL (ref 1.8–7.7)
NRBC BLD-RTO: 0 PER 100 WBC
NRBC BLD-RTO: 0 PER 100 WBC
PARTIAL THROMBOPLASTIN TIME: 31.2 SEC (ref 23–36.5)
PLATELET # BLD AUTO: ABNORMAL K/UL (ref 138–453)
PLATELET # BLD AUTO: ABNORMAL K/UL (ref 138–453)
PLATELET, FLUORESCENCE: 47 K/UL (ref 138–453)
PLATELET, FLUORESCENCE: 8 K/UL (ref 138–453)
PLATELETS.RETICULATED NFR BLD AUTO: 23.2 % (ref 1.1–10.3)
PLATELETS.RETICULATED NFR BLD AUTO: 5.3 % (ref 1.1–10.3)
PMV BLD AUTO: ABNORMAL FL (ref 8.1–13.5)
POTASSIUM SERPL-SCNC: 3.7 MMOL/L (ref 3.7–5.3)
PROT SERPL-MCNC: 7 G/DL (ref 6.6–8.7)
PROTHROMBIN TIME: 16.2 SEC (ref 11.7–14.9)
RBC # BLD AUTO: 2.62 M/UL (ref 4.21–5.77)
RBC # BLD AUTO: 2.67 M/UL (ref 4.21–5.77)
RETIC HEMOGLOBIN: 31.7 PG (ref 28.2–35.7)
RETICS # AUTO: <0.01 M/UL (ref 0.03–0.08)
RETICS/RBC NFR AUTO: 0 % (ref 0.5–1.9)
SODIUM SERPL-SCNC: 137 MMOL/L (ref 136–145)
TRANSFUSION STATUS: NORMAL
TRANSFUSION STATUS: NORMAL
UNIT DIVISION: 0
UNIT DIVISION: 0
URATE SERPL-MCNC: 7.4 MG/DL (ref 3.4–7)
WBC OTHER # BLD: 6 K/UL (ref 3.5–11.3)
WBC OTHER # BLD: 7.3 K/UL (ref 3.5–11.3)

## 2024-11-21 PROCEDURE — 36415 COLL VENOUS BLD VENIPUNCTURE: CPT

## 2024-11-21 PROCEDURE — 76937 US GUIDE VASCULAR ACCESS: CPT

## 2024-11-21 PROCEDURE — 86901 BLOOD TYPING SEROLOGIC RH(D): CPT

## 2024-11-21 PROCEDURE — 86900 BLOOD TYPING SEROLOGIC ABO: CPT

## 2024-11-21 PROCEDURE — 80048 BASIC METABOLIC PNL TOTAL CA: CPT

## 2024-11-21 PROCEDURE — 36430 TRANSFUSION BLD/BLD COMPNT: CPT

## 2024-11-21 PROCEDURE — 86850 RBC ANTIBODY SCREEN: CPT

## 2024-11-21 PROCEDURE — 85055 RETICULATED PLATELET ASSAY: CPT

## 2024-11-21 PROCEDURE — 85027 COMPLETE CBC AUTOMATED: CPT

## 2024-11-21 PROCEDURE — 80074 ACUTE HEPATITIS PANEL: CPT

## 2024-11-21 PROCEDURE — C1751 CATH, INF, PER/CENT/MIDLINE: HCPCS

## 2024-11-21 PROCEDURE — 1200000000 HC SEMI PRIVATE

## 2024-11-21 PROCEDURE — P9073 PLATELETS PHERESIS PATH REDU: HCPCS

## 2024-11-21 PROCEDURE — 84550 ASSAY OF BLOOD/URIC ACID: CPT

## 2024-11-21 PROCEDURE — 86923 COMPATIBILITY TEST ELECTRIC: CPT

## 2024-11-21 PROCEDURE — 2580000003 HC RX 258: Performed by: INTERNAL MEDICINE

## 2024-11-21 PROCEDURE — 85610 PROTHROMBIN TIME: CPT

## 2024-11-21 PROCEDURE — 85045 AUTOMATED RETICULOCYTE COUNT: CPT

## 2024-11-21 PROCEDURE — 30233R1 TRANSFUSION OF NONAUTOLOGOUS PLATELETS INTO PERIPHERAL VEIN, PERCUTANEOUS APPROACH: ICD-10-PCS | Performed by: INTERNAL MEDICINE

## 2024-11-21 PROCEDURE — 6370000000 HC RX 637 (ALT 250 FOR IP): Performed by: NURSE PRACTITIONER

## 2024-11-21 PROCEDURE — 85730 THROMBOPLASTIN TIME PARTIAL: CPT

## 2024-11-21 PROCEDURE — 85384 FIBRINOGEN ACTIVITY: CPT

## 2024-11-21 PROCEDURE — 99222 1ST HOSP IP/OBS MODERATE 55: CPT | Performed by: NURSE PRACTITIONER

## 2024-11-21 PROCEDURE — 30233N1 TRANSFUSION OF NONAUTOLOGOUS RED BLOOD CELLS INTO PERIPHERAL VEIN, PERCUTANEOUS APPROACH: ICD-10-PCS | Performed by: INTERNAL MEDICINE

## 2024-11-21 PROCEDURE — 99223 1ST HOSP IP/OBS HIGH 75: CPT | Performed by: INTERNAL MEDICINE

## 2024-11-21 PROCEDURE — 83010 ASSAY OF HAPTOGLOBIN QUANT: CPT

## 2024-11-21 PROCEDURE — 99285 EMERGENCY DEPT VISIT HI MDM: CPT

## 2024-11-21 PROCEDURE — 83615 LACTATE (LD) (LDH) ENZYME: CPT

## 2024-11-21 PROCEDURE — 85025 COMPLETE CBC W/AUTO DIFF WBC: CPT

## 2024-11-21 PROCEDURE — 80076 HEPATIC FUNCTION PANEL: CPT

## 2024-11-21 PROCEDURE — 2580000003 HC RX 258: Performed by: NURSE PRACTITIONER

## 2024-11-21 PROCEDURE — 36569 INSJ PICC 5 YR+ W/O IMAGING: CPT

## 2024-11-21 RX ORDER — EPINEPHRINE 1 MG/ML
0.3 INJECTION, SOLUTION, CONCENTRATE INTRAVENOUS PRN
Status: CANCELLED | OUTPATIENT
Start: 2024-11-21

## 2024-11-21 RX ORDER — ONDANSETRON 2 MG/ML
8 INJECTION INTRAMUSCULAR; INTRAVENOUS EVERY 8 HOURS
Status: CANCELLED | OUTPATIENT
Start: 2024-11-21

## 2024-11-21 RX ORDER — ACETAMINOPHEN 650 MG/1
650 SUPPOSITORY RECTAL EVERY 6 HOURS PRN
Status: DISCONTINUED | OUTPATIENT
Start: 2024-11-21 | End: 2024-12-01

## 2024-11-21 RX ORDER — ACETAMINOPHEN 325 MG/1
650 TABLET ORAL
Status: CANCELLED | OUTPATIENT
Start: 2024-11-21

## 2024-11-21 RX ORDER — ONDANSETRON 2 MG/ML
8 INJECTION INTRAMUSCULAR; INTRAVENOUS
Status: CANCELLED | OUTPATIENT
Start: 2024-11-21

## 2024-11-21 RX ORDER — DIPHENHYDRAMINE HYDROCHLORIDE 50 MG/ML
50 INJECTION INTRAMUSCULAR; INTRAVENOUS
Status: CANCELLED | OUTPATIENT
Start: 2024-11-21

## 2024-11-21 RX ORDER — LORAZEPAM 2 MG/ML
0.5 INJECTION INTRAMUSCULAR EVERY 6 HOURS PRN
Status: CANCELLED | OUTPATIENT
Start: 2024-11-21

## 2024-11-21 RX ORDER — ALBUTEROL SULFATE 90 UG/1
4 INHALANT RESPIRATORY (INHALATION) PRN
Status: CANCELLED | OUTPATIENT
Start: 2024-11-21

## 2024-11-21 RX ORDER — SODIUM CHLORIDE 9 MG/ML
INJECTION, SOLUTION INTRAVENOUS PRN
Status: DISCONTINUED | OUTPATIENT
Start: 2024-11-21 | End: 2024-11-22 | Stop reason: HOSPADM

## 2024-11-21 RX ORDER — SODIUM CHLORIDE 0.9 % (FLUSH) 0.9 %
5-40 SYRINGE (ML) INJECTION EVERY 12 HOURS SCHEDULED
Status: DISCONTINUED | OUTPATIENT
Start: 2024-11-21 | End: 2024-12-18 | Stop reason: HOSPADM

## 2024-11-21 RX ORDER — POLYETHYLENE GLYCOL 3350 17 G/17G
17 POWDER, FOR SOLUTION ORAL DAILY PRN
Status: DISCONTINUED | OUTPATIENT
Start: 2024-11-21 | End: 2024-12-18 | Stop reason: HOSPADM

## 2024-11-21 RX ORDER — SODIUM CHLORIDE 9 MG/ML
INJECTION, SOLUTION INTRAVENOUS PRN
Status: DISCONTINUED | OUTPATIENT
Start: 2024-11-21 | End: 2024-12-18 | Stop reason: HOSPADM

## 2024-11-21 RX ORDER — SODIUM CHLORIDE 0.9 % (FLUSH) 0.9 %
5-40 SYRINGE (ML) INJECTION PRN
Status: CANCELLED | OUTPATIENT
Start: 2024-11-21

## 2024-11-21 RX ORDER — SODIUM CHLORIDE 9 MG/ML
5-250 INJECTION, SOLUTION INTRAVENOUS PRN
Status: CANCELLED | OUTPATIENT
Start: 2024-11-21

## 2024-11-21 RX ORDER — ONDANSETRON 4 MG/1
4 TABLET, ORALLY DISINTEGRATING ORAL EVERY 8 HOURS PRN
Status: DISCONTINUED | OUTPATIENT
Start: 2024-11-21 | End: 2024-12-18 | Stop reason: HOSPADM

## 2024-11-21 RX ORDER — LIDOCAINE HYDROCHLORIDE 10 MG/ML
50 INJECTION, SOLUTION INFILTRATION; PERINEURAL ONCE
Status: DISCONTINUED | OUTPATIENT
Start: 2024-11-21 | End: 2024-12-11

## 2024-11-21 RX ORDER — SODIUM CHLORIDE 0.9 % (FLUSH) 0.9 %
5-40 SYRINGE (ML) INJECTION PRN
Status: DISCONTINUED | OUTPATIENT
Start: 2024-11-21 | End: 2024-12-18 | Stop reason: HOSPADM

## 2024-11-21 RX ORDER — POTASSIUM CHLORIDE 1500 MG/1
40 TABLET, EXTENDED RELEASE ORAL PRN
Status: ACTIVE | OUTPATIENT
Start: 2024-11-21 | End: 2024-12-07

## 2024-11-21 RX ORDER — BISACODYL 10 MG
10 SUPPOSITORY, RECTAL RECTAL DAILY PRN
Status: DISCONTINUED | OUTPATIENT
Start: 2024-11-21 | End: 2024-12-18 | Stop reason: HOSPADM

## 2024-11-21 RX ORDER — SODIUM CHLORIDE 9 MG/ML
INJECTION, SOLUTION INTRAVENOUS CONTINUOUS
Status: DISCONTINUED | OUTPATIENT
Start: 2024-11-21 | End: 2024-11-21

## 2024-11-21 RX ORDER — DAUNORUBICIN HYDROCHLORIDE 5 MG/ML
60 INJECTION, SOLUTION INTRAVENOUS EVERY 24 HOURS
Status: CANCELLED | OUTPATIENT
Start: 2024-11-21

## 2024-11-21 RX ORDER — POTASSIUM CHLORIDE 7.45 MG/ML
10 INJECTION INTRAVENOUS PRN
Status: ACTIVE | OUTPATIENT
Start: 2024-11-21 | End: 2024-12-07

## 2024-11-21 RX ORDER — FAMOTIDINE 10 MG/ML
20 INJECTION, SOLUTION INTRAVENOUS
Status: CANCELLED | OUTPATIENT
Start: 2024-11-21

## 2024-11-21 RX ORDER — HEPARIN 100 UNIT/ML
500 SYRINGE INTRAVENOUS PRN
Status: CANCELLED | OUTPATIENT
Start: 2024-11-21

## 2024-11-21 RX ORDER — HYDROCORTISONE SODIUM SUCCINATE 100 MG/2ML
100 INJECTION INTRAMUSCULAR; INTRAVENOUS
Status: CANCELLED | OUTPATIENT
Start: 2024-11-21

## 2024-11-21 RX ORDER — SODIUM CHLORIDE 9 MG/ML
INJECTION, SOLUTION INTRAVENOUS CONTINUOUS
Status: CANCELLED | OUTPATIENT
Start: 2024-11-21

## 2024-11-21 RX ORDER — MEPERIDINE HYDROCHLORIDE 50 MG/ML
12.5 INJECTION INTRAMUSCULAR; INTRAVENOUS; SUBCUTANEOUS PRN
Status: CANCELLED | OUTPATIENT
Start: 2024-11-21

## 2024-11-21 RX ORDER — METOPROLOL SUCCINATE 25 MG/1
25 TABLET, EXTENDED RELEASE ORAL DAILY
Status: DISCONTINUED | OUTPATIENT
Start: 2024-11-21 | End: 2024-12-02

## 2024-11-21 RX ORDER — ACETAMINOPHEN 325 MG/1
650 TABLET ORAL EVERY 6 HOURS PRN
Status: DISCONTINUED | OUTPATIENT
Start: 2024-11-21 | End: 2024-12-01

## 2024-11-21 RX ORDER — ONDANSETRON 2 MG/ML
4 INJECTION INTRAMUSCULAR; INTRAVENOUS EVERY 6 HOURS PRN
Status: DISCONTINUED | OUTPATIENT
Start: 2024-11-21 | End: 2024-12-18 | Stop reason: HOSPADM

## 2024-11-21 RX ORDER — METOPROLOL SUCCINATE 25 MG/1
25 TABLET, EXTENDED RELEASE ORAL DAILY
COMMUNITY
Start: 2024-11-01

## 2024-11-21 RX ADMIN — SODIUM CHLORIDE, PRESERVATIVE FREE 10 ML: 5 INJECTION INTRAVENOUS at 22:26

## 2024-11-21 RX ADMIN — SODIUM CHLORIDE, PRESERVATIVE FREE 10 ML: 5 INJECTION INTRAVENOUS at 22:27

## 2024-11-21 RX ADMIN — ACETAMINOPHEN 650 MG: 325 TABLET ORAL at 22:24

## 2024-11-21 ASSESSMENT — LIFESTYLE VARIABLES
HOW OFTEN DO YOU HAVE A DRINK CONTAINING ALCOHOL: NEVER
HOW MANY STANDARD DRINKS CONTAINING ALCOHOL DO YOU HAVE ON A TYPICAL DAY: PATIENT DOES NOT DRINK

## 2024-11-21 ASSESSMENT — PAIN DESCRIPTION - LOCATION: LOCATION: HEAD

## 2024-11-21 ASSESSMENT — PAIN DESCRIPTION - DESCRIPTORS: DESCRIPTORS: ACHING

## 2024-11-21 ASSESSMENT — PAIN SCALES - GENERAL: PAINLEVEL_OUTOF10: 7

## 2024-11-21 ASSESSMENT — PAIN - FUNCTIONAL ASSESSMENT: PAIN_FUNCTIONAL_ASSESSMENT: NONE - DENIES PAIN

## 2024-11-21 NOTE — PROGRESS NOTES
Patient arrives ambulatory with family for possible Platelet transfusion  Denies complaint/concern  Baseline VS as charted  Labs reviewed  Plt 8  Hgb 7.2  PIV inserted by KEVIN Odell to Rt AC area  NS infusing  1 unit Platelets infused without adverse reaction  Line flushed while pt monitored  VSS as charted   Stable for discharge  IV removed & pressure dressing applied   Pt discharged with family  Returns 11/25 for CBC/possible Platelets  
Oriented - self; Oriented - place; Oriented - time

## 2024-11-21 NOTE — ED NOTES
ED to inpatient nurses report      Chief Complaint:  Chief Complaint   Patient presents with   • abnormal lab value     Present to ED from: home    MOA:     LOC: alert and orientated to name, place, date  Mobility: Independent  Oxygen Baseline: RA    Current needs required: RA  Pending ED orders: n/a  Present condition: stable     Why did the patient come to the ED?     abnormal lab value     What is the plan? Admission   Any procedures or intervention occur? Labs, imaging , ekg  Any safety concerns: n/a    Mental Status:       Psych Assessment:   Psychosocial  Psychosocial (WDL): Within Defined Limits  Vital signs   Vitals:    11/21/24 1514   BP: (!) 140/73   Pulse: 95   Resp: 16   Temp: 99.1 °F (37.3 °C)   TempSrc: Oral   SpO2: 100%        Vitals:  Patient Vitals for the past 24 hrs:   BP Temp Temp src Pulse Resp SpO2   11/21/24 1514 (!) 140/73 99.1 °F (37.3 °C) Oral 95 16 100 %      Visit Vitals  BP (!) 140/73   Pulse 95   Temp 99.1 °F (37.3 °C) (Oral)   Resp 16   SpO2 100%        LDAs:   Peripheral IV 11/21/24 Distal;Left Antecubital (Active)   Site Assessment Clean, dry & intact 11/21/24 1518   Line Status Blood return noted 11/21/24 1518       Ambulatory Status:  Presents to emergency department  because of falls (Syncope, seizure, or loss of consciousness): No, Age > 70: No, Altered Mental Status, Intoxication with alcohol or substance confusion (Disorientation, impaired judgment, poor safety awaremess, or inability to follow instructions): No, Impaired Mobility: Ambulates or transfers with assistive devices or assistance; Unable to ambulate or transer.: No, Nursing Judgement: No    Diagnosis:  DISPOSITION Decision To Admit 11/21/2024 03:21:11 PM   Final diagnoses:   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 [MO]   1541 Hemoglobin Quant(!): 7.1  Hemoglobin 7.1, most recent hematology/oncology note states to keep hemoglobin above 7.  No blood transfusion indicated at this time. [MO]   1656 Page sent to [facility] attending regarding admission orders for this patient. [MO]      ED Course User Index  [MO] Shannon Mcguire MD          Skin Assessment:        Pain Score:  Pain Assessment  Pain Assessment: None - Denies Pain      SOCIAL HISTORY       Social History     Socioeconomic History   • Marital status:    Tobacco Use   • Smoking status: Never   • Smokeless tobacco: Never   Vaping Use   • Vaping status: Never Used   Substance and Sexual Activity   • Alcohol use: Not Currently   • Drug use: Never   • Sexual activity: Never     Social Determinants of Health     Food Insecurity: No Food Insecurity (11/1/2024)    Hunger Vital Sign    • Worried About Running Out of Food in the Last Year: Never true    • Ran Out of Food in the Last Year: Never true   Transportation Needs: No Transportation Needs (11/1/2024)    PRAPARE - Transportation    • Lack of Transportation (Medical): No    • Lack of Transportation (Non-Medical): No   Housing Stability: Low Risk  (11/1/2024)    Housing Stability Vital Sign    • Unable to Pay for Housing in the Last Year: No    • Number of Times Moved in the Last Year: 0    • Homeless in the Last Year: No       FAMILY HISTORY       Family History   Problem Relation Age of Onset   • Stroke Mother    • Stroke Father        ALLERGIES     Patient has no known allergies.    CURRENT MEDICATIONS       Previous Medications    MULTIPLE VITAMIN (MULTIVITAMIN ADULT PO)    Take by mouth     No orders of the defined types were placed in this encounter.      SURGICAL HISTORY       Past Surgical History:   Procedure Laterality Date

## 2024-11-21 NOTE — ED NOTES
Sent by oncology after platelet transfusion for abnormal lab values. Pt state he rock have some fatigue but that has been his usual for over a month. Denies pain

## 2024-11-21 NOTE — H&P
[facility]  Office: [phone]  Carlos Ramírez DO, Jerry Washington DO, Keo Esquivel DO, Matthieu Steven DO, Janet Barr MD, Ivy Rome MD, Kimberlyn Jenkins MD, Deborah Dunbar MD,  Nick Garcia MD, Carlton Roth MD, Hellen Segura MD,  Cherry Dee DO, Carlos Doe MD, Cullen Damon MD, Alban Ramírez DO, Carol Wyatt MD,  Andrew Plata DO, Juanis Lees MD, Hui Laughlin MD, Mira Gee MD, Sonia Escobedo MD,  Gil Wheat MD, Chi Bang MD, Refugio Elliott MD, Livia Hinojosa MD, Sumit Michel MD, Mika Benjamin MD, Presley Hughes DO, Billy Pederson MD, Shirley Waterhouse, CNP,  Corin Kennedy, CNP, Presley Billy, CNP,  Sia Hancock, BESSY, Grace Romo, CNP, Nicole Vargas, CNP, Carolynn Mckinney, CNP, Clau Russell, CNP, Faye Siu PA-C, Elena Rosen PA-C, Mandy Juárez, CNP, Mc Woo, CNP,  Lupe Ramirez, CNP, Valeria Tai, CNP,  Niru Man, CNP, Janessa Carter, CNP         [facility]   IN-PATIENT SERVICE   [facility]    HISTORY AND PHYSICAL EXAMINATION            Date:   11/21/2024  Patient name:  Osbaldo Duff  Date of admission:  11/21/2024  2:53 PM  MRN:   [id number]  Account:  [id number]  YOB: 1955  PCP:    No primary care provider on file.  Room:   /  Code Status:    Full Code    Chief Complaint:     Chief Complaint   Patient presents with    abnormal lab value     History Obtained From:     patient and medical records     History of Present Illness:     Osbaldo Duff is a 69 y.o. Russian male pmh htn and recent dx of anemia/thrombocytopenia  who presents to ER as advised by his oncologist given concerns acute leukemia resulting from his bone marrow biopsy 11/11/2024. Of note, He presented the beginning of November with complaints of fatigue, SOB  and low-grade temperature and was Admitted 11/1/2024-11/2/2024 for acute anemia (hbg=6.5 & platelets =18 )thrombocytopenia. Received transfusions

## 2024-11-21 NOTE — ED PROVIDER NOTES
[facility]     Emergency Department     Faculty Attestation    I performed a history and physical examination of the patient and discussed management with the resident. I reviewed the resident’s note and agree with the documented findings and plan of care. Any areas of disagreement are noted on the chart. I was personally present for the key portions of any procedures. I have documented in the chart those procedures where I was not present during the key portions. I have reviewed the emergency nurses triage note. I agree with the chief complaint, past medical history, past surgical history, allergies, medications, social and family history as documented unless otherwise noted below. Documentation of the HPI, Physical Exam and Medical Decision Making performed by medical students or scribes is based on my personal performance of the HPI, PE and MDM. For Physician Assistant/ Nurse Practitioner cases/documentation I have personally evaluated this patient and have completed at least one if not all key elements of the E/M (history, physical exam, and MDM). Additional findings are as noted.    Vital signs:   Vitals:    11/21/24 1514   BP: (!) 140/73   Pulse: 95   Resp: 16   Temp: 99.1 °F (37.3 °C)   SpO2: 100%      69-year-old male referred to the emergency department secondary to a bone marrow biopsy that was recently done that showed acute leukemia.  The patient speaks very little English.  His primary language is Andorran.  He does have family at the bedside to translate for him.  He has mostly just been feeling fatigued.  No spontaneous bleeding.  Patient has been receiving intermittent platelet transfusions in the infusion center.  Plan for admission to internal medicine with oncology in consultation.            Erika Porras M.D,  Attending Emergency  Physician           Erika Porras MD  11/21/24 3192

## 2024-11-21 NOTE — ED PROVIDER NOTES
[facility] ONC/MED SURG  Emergency Department Encounter  Emergency Medicine Resident     Pt Name:Osbaldo Duff  MRN: [id number]  Birthdate 1955  Date of evaluation: 11/21/24  PCP:  No primary care provider on file.  Note Started: 3:10 PM EST      CHIEF COMPLAINT       Chief Complaint   Patient presents with    Abnormal Lab       HISTORY OF PRESENT ILLNESS  (Location/Symptom, Timing/Onset, Context/Setting, Quality, Duration, Modifying Factors, Severity.)      Osbaldo Duff is a 69 y.o. male who presents with concern for acute leukemia identified on bone marrow, patient was sent from his hematologist/oncologist office today for admission for initiation of chemotherapy.  Patient was seen in our ER within the last month for anemia and thrombocytopenia and patient was diagnosed with myelodysplastic syndrome.  He has been following up with oncology in the outpatient setting for ongoing platelet transfusions.  He had a bone marrow biopsy which came back positive for acute leukemia as per his oncologist today.  Patient does not have any chest pain or shortness of breath today.  He does complain of generalized malaise and generalized weakness, but does not have any abdominal pain, or any other symptoms.    PAST MEDICAL / SURGICAL / SOCIAL / FAMILY HISTORY      has a past medical history of Anemia, Thrombocytopenia (HCC), and Wears dentures.       has a past surgical history that includes Hand surgery; Inguinal hernia repair; and CT BIOPSY BONE MARROW (11/11/2024).      Social History     Socioeconomic History    Marital status:      Spouse name: Not on file    Number of children: Not on file    Years of education: Not on file    Highest education level: Not on file   Occupational History    Not on file   Tobacco Use    Smoking status: Never    Smokeless tobacco: Never   Vaping Use    Vaping status: Never Used   Substance and Sexual Activity    Alcohol use: Not Currently    Drug use: Never    Sexual activity:  Never   Other Topics Concern    Not on file   Social History Narrative    Not on file     Social Determinants of Health     Financial Resource Strain: Not on file   Food Insecurity: No Food Insecurity (11/1/2024)    Hunger Vital Sign     Worried About Running Out of Food in the Last Year: Never true     Ran Out of Food in the Last Year: Never true   Transportation Needs: No Transportation Needs (11/1/2024)    PRAPARE - Transportation     Lack of Transportation (Medical): No     Lack of Transportation (Non-Medical): No   Physical Activity: Not on file   Stress: Not on file   Social Connections: Not on file   Intimate Partner Violence: Not on file   Housing Stability: Low Risk  (11/1/2024)    Housing Stability Vital Sign     Unable to Pay for Housing in the Last Year: No     Number of Times Moved in the Last Year: 0     Homeless in the Last Year: No       Family History   Problem Relation Age of Onset    Stroke Mother     Stroke Father        Allergies:  Patient has no known allergies.    Home Medications:  Prior to Admission medications    Medication Sig Start Date End Date Taking? Authorizing Provider   metoprolol succinate (TOPROL XL) 25 MG extended release tablet Take 1 tablet by mouth daily 11/1/24  Yes ProviderDexter MD   Multiple Vitamin (MULTIVITAMIN ADULT PO) Take by mouth    ProviderDexter MD       REVIEW OF SYSTEMS       See HPI    PHYSICAL EXAM      INITIAL VITALS:   /71   Pulse 88   Temp 99.1 °F (37.3 °C) (Oral)   Resp 17   Wt 80.5 kg (177 lb 7.5 oz)   SpO2 99%   BMI 24.85 kg/m²     Physical Exam  Constitutional:       General: He is not in acute distress.     Appearance: He is ill-appearing.   HENT:      Head: Normocephalic and atraumatic.      Mouth/Throat:      Mouth: Mucous membranes are moist.   Eyes:      Extraocular Movements: Extraocular movements intact.      Pupils: Pupils are equal, round, and reactive to light.   Cardiovascular:      Rate and Rhythm: Normal rate and

## 2024-11-21 NOTE — CONSULTS
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 male who is admitted to the hospital on 11/21/2024  for anemia, low hb and acute leukemia diagnosis    Patient was following with Dr. Gallego as outpatient for possible suspected bone marrow pathology including MDS/leukemia.    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    Patient has pancytopenia.  Patient is now sent to ER for further management and likely administration of induction chemotherapy.    Brief oncologic history as follows  Current problems:  Probable MDS, awaiting second opinion from [facility]  Anemia and thrombocytopenia     Active and recent treatments:  Follow-up on second opinion from [facility]  Anticipating venetoclax and Vidaza     Summary of Case/History:  Osbaldo Duff a 69 y.o.male is a patient who admitted to the hospital due to abnormal lab workup.  History was obtained through an .  Patient understand very limited English.  Patient has not seen a doctor for a while.  Was seen by primary care provider due to complaints of fatigue also having low-grade fever.  Lab workup showed significantly abnormalities including severe thrombocytopenia and anemia subsequently patient sent to the ER.     Patient denies any weight loss swollen glands.  Does complain of having viral syndrome like symptoms over the last 1 week.  Denies noticing any bleeding.

## 2024-11-22 ENCOUNTER — APPOINTMENT (OUTPATIENT)
Age: 69
DRG: 834 | End: 2024-11-22
Attending: INTERNAL MEDICINE
Payer: COMMERCIAL

## 2024-11-22 PROBLEM — Z51.11 ADMISSION FOR CHEMOTHERAPY: Status: ACTIVE | Noted: 2024-11-22

## 2024-11-22 PROBLEM — C94.00: Status: ACTIVE | Noted: 2024-11-22

## 2024-11-22 PROBLEM — E79.0 HYPERURICEMIA: Status: ACTIVE | Noted: 2024-11-22

## 2024-11-22 LAB
ANION GAP SERPL CALCULATED.3IONS-SCNC: 9 MMOL/L (ref 9–16)
BASOPHILS # BLD: 0 K/UL (ref 0–0.2)
BASOPHILS NFR BLD: 0 % (ref 0–2)
BUN SERPL-MCNC: 16 MG/DL (ref 8–23)
CALCIUM SERPL-MCNC: 8.9 MG/DL (ref 8.6–10.4)
CHLORIDE SERPL-SCNC: 104 MMOL/L (ref 98–107)
CO2 SERPL-SCNC: 25 MMOL/L (ref 20–31)
CREAT SERPL-MCNC: 1.2 MG/DL (ref 0.7–1.2)
ECHO AO ROOT DIAM: 3.3 CM
ECHO AO ROOT INDEX: 1.69 CM/M2
ECHO AV AREA PEAK VELOCITY: 3 CM2
ECHO AV AREA VTI: 3.2 CM2
ECHO AV AREA/BSA PEAK VELOCITY: 1.5 CM2/M2
ECHO AV AREA/BSA VTI: 1.6 CM2/M2
ECHO AV MEAN GRADIENT: 2 MMHG
ECHO AV MEAN VELOCITY: 0.7 M/S
ECHO AV PEAK GRADIENT: 6 MMHG
ECHO AV PEAK VELOCITY: 1.2 M/S
ECHO AV VELOCITY RATIO: 0.75
ECHO AV VTI: 20.2 CM
ECHO BSA: 1.95 M2
ECHO LA AREA 2C: 17.7 CM2
ECHO LA AREA 4C: 14.2 CM2
ECHO LA DIAMETER INDEX: 1.44 CM/M2
ECHO LA DIAMETER: 2.8 CM
ECHO LA MAJOR AXIS: 5.1 CM
ECHO LA MINOR AXIS: 4.9 CM
ECHO LA TO AORTIC ROOT RATIO: 0.85
ECHO LA VOL BP: 37 ML (ref 18–58)
ECHO LA VOL MOD A2C: 51 ML (ref 18–58)
ECHO LA VOL MOD A4C: 26 ML (ref 18–58)
ECHO LA VOL/BSA BIPLANE: 19 ML/M2 (ref 16–34)
ECHO LA VOLUME INDEX MOD A2C: 26 ML/M2 (ref 16–34)
ECHO LA VOLUME INDEX MOD A4C: 13 ML/M2 (ref 16–34)
ECHO LV E' LATERAL VELOCITY: 11.4 CM/S
ECHO LV E' SEPTAL VELOCITY: 9.46 CM/S
ECHO LV EDV A2C: 90 ML
ECHO LV EDV A4C: 73 ML
ECHO LV EDV INDEX A4C: 37 ML/M2
ECHO LV EDV NDEX A2C: 46 ML/M2
ECHO LV EJECTION FRACTION A2C: 58 %
ECHO LV EJECTION FRACTION A4C: 60 %
ECHO LV EJECTION FRACTION BIPLANE: 58 % (ref 55–100)
ECHO LV ESV A2C: 38 ML
ECHO LV ESV A4C: 29 ML
ECHO LV ESV INDEX A2C: 19 ML/M2
ECHO LV ESV INDEX A4C: 15 ML/M2
ECHO LV FRACTIONAL SHORTENING: 29 % (ref 28–44)
ECHO LV GLOBAL LONGITUDINAL STRAIN (GLS): -16.4 %
ECHO LV INTERNAL DIMENSION DIASTOLE INDEX: 2.51 CM/M2
ECHO LV INTERNAL DIMENSION DIASTOLIC: 4.9 CM (ref 4.2–5.9)
ECHO LV INTERNAL DIMENSION SYSTOLIC INDEX: 1.79 CM/M2
ECHO LV INTERNAL DIMENSION SYSTOLIC: 3.5 CM
ECHO LV IVSD: 1 CM (ref 0.6–1)
ECHO LV MASS 2D: 188.1 G (ref 88–224)
ECHO LV MASS INDEX 2D: 96.5 G/M2 (ref 49–115)
ECHO LV POSTERIOR WALL DIASTOLIC: 1.1 CM (ref 0.6–1)
ECHO LV RELATIVE WALL THICKNESS RATIO: 0.45
ECHO LVOT AREA: 4.2 CM2
ECHO LVOT AV VTI INDEX: 0.76
ECHO LVOT DIAM: 2.3 CM
ECHO LVOT MEAN GRADIENT: 1 MMHG
ECHO LVOT PEAK GRADIENT: 3 MMHG
ECHO LVOT PEAK VELOCITY: 0.9 M/S
ECHO LVOT STROKE VOLUME INDEX: 32.8 ML/M2
ECHO LVOT SV: 64 ML
ECHO LVOT VTI: 15.4 CM
ECHO MV A VELOCITY: 0.63 M/S
ECHO MV AREA VTI: 3.1 CM2
ECHO MV E DECELERATION TIME (DT): 180 MS
ECHO MV E VELOCITY: 0.84 M/S
ECHO MV E/A RATIO: 1.33
ECHO MV E/E' LATERAL: 7.37
ECHO MV E/E' RATIO (AVERAGED): 8.12
ECHO MV E/E' SEPTAL: 8.88
ECHO MV LVOT VTI INDEX: 1.35
ECHO MV MAX VELOCITY: 0.9 M/S
ECHO MV MEAN GRADIENT: 2 MMHG
ECHO MV MEAN VELOCITY: 0.6 M/S
ECHO MV PEAK GRADIENT: 3 MMHG
ECHO MV VTI: 20.8 CM
ECHO PV MAX VELOCITY: 1.3 M/S
ECHO PV PEAK GRADIENT: 6 MMHG
ECHO RV BASAL DIMENSION: 4.2 CM
ECHO RV FREE WALL PEAK S': 14.3 CM/S
ECHO RV TAPSE: 2.4 CM (ref 1.7–?)
EOSINOPHIL # BLD: 0.11 K/UL (ref 0–0.4)
EOSINOPHILS RELATIVE PERCENT: 2 % (ref 1–4)
ERYTHROCYTE [DISTWIDTH] IN BLOOD BY AUTOMATED COUNT: 22.1 % (ref 11.8–14.4)
FLOW CYTOMETRY, BM: NORMAL
GFR, ESTIMATED: 66 ML/MIN/1.73M2
GLUCOSE SERPL-MCNC: 95 MG/DL (ref 74–99)
HCT VFR BLD AUTO: 20.3 % (ref 40.7–50.3)
HCT VFR BLD AUTO: 26.6 % (ref 40.7–50.3)
HGB BLD-MCNC: 6.6 G/DL (ref 13–17)
HGB BLD-MCNC: 8.9 G/DL (ref 13–17)
IMM GRANULOCYTES # BLD AUTO: 0.23 K/UL (ref 0–0.3)
IMM GRANULOCYTES NFR BLD: 4 %
LYMPHOCYTES NFR BLD: 2 K/UL (ref 1–4.8)
LYMPHOCYTES RELATIVE PERCENT: 35 % (ref 24–44)
MCH RBC QN AUTO: 26.5 PG (ref 25.2–33.5)
MCHC RBC AUTO-ENTMCNC: 32.5 G/DL (ref 28.4–34.8)
MCV RBC AUTO: 81.5 FL (ref 82.6–102.9)
MONOCYTES NFR BLD: 0.11 K/UL (ref 0.1–0.8)
MONOCYTES NFR BLD: 2 % (ref 1–7)
MORPHOLOGY: ABNORMAL
NEUTROPHILS NFR BLD: 57 % (ref 36–66)
NEUTS SEG NFR BLD: 3.25 K/UL (ref 1.8–7.7)
NRBC BLD-RTO: 0 PER 100 WBC
PLATELET # BLD AUTO: ABNORMAL K/UL (ref 138–453)
PLATELET, FLUORESCENCE: 24 K/UL (ref 138–453)
PLATELETS.RETICULATED NFR BLD AUTO: 7.2 % (ref 1.1–10.3)
POTASSIUM SERPL-SCNC: 4.2 MMOL/L (ref 3.7–5.3)
RBC # BLD AUTO: 2.49 M/UL (ref 4.21–5.77)
SODIUM SERPL-SCNC: 138 MMOL/L (ref 136–145)
WBC OTHER # BLD: 5.7 K/UL (ref 3.5–11.3)

## 2024-11-22 PROCEDURE — 93306 TTE W/DOPPLER COMPLETE: CPT

## 2024-11-22 PROCEDURE — 85018 HEMOGLOBIN: CPT

## 2024-11-22 PROCEDURE — 93306 TTE W/DOPPLER COMPLETE: CPT | Performed by: INTERNAL MEDICINE

## 2024-11-22 PROCEDURE — 93356 MYOCRD STRAIN IMG SPCKL TRCK: CPT | Performed by: INTERNAL MEDICINE

## 2024-11-22 PROCEDURE — 97161 PT EVAL LOW COMPLEX 20 MIN: CPT

## 2024-11-22 PROCEDURE — 2580000003 HC RX 258: Performed by: INTERNAL MEDICINE

## 2024-11-22 PROCEDURE — P9016 RBC LEUKOCYTES REDUCED: HCPCS

## 2024-11-22 PROCEDURE — 80048 BASIC METABOLIC PNL TOTAL CA: CPT

## 2024-11-22 PROCEDURE — 97530 THERAPEUTIC ACTIVITIES: CPT

## 2024-11-22 PROCEDURE — 1200000000 HC SEMI PRIVATE

## 2024-11-22 PROCEDURE — 6360000002 HC RX W HCPCS: Performed by: INTERNAL MEDICINE

## 2024-11-22 PROCEDURE — 99233 SBSQ HOSP IP/OBS HIGH 50: CPT | Performed by: INTERNAL MEDICINE

## 2024-11-22 PROCEDURE — 6370000000 HC RX 637 (ALT 250 FOR IP): Performed by: NURSE PRACTITIONER

## 2024-11-22 PROCEDURE — 97535 SELF CARE MNGMENT TRAINING: CPT

## 2024-11-22 PROCEDURE — 36430 TRANSFUSION BLD/BLD COMPNT: CPT

## 2024-11-22 PROCEDURE — 97165 OT EVAL LOW COMPLEX 30 MIN: CPT

## 2024-11-22 PROCEDURE — 85055 RETICULATED PLATELET ASSAY: CPT

## 2024-11-22 PROCEDURE — 36415 COLL VENOUS BLD VENIPUNCTURE: CPT

## 2024-11-22 PROCEDURE — 99232 SBSQ HOSP IP/OBS MODERATE 35: CPT | Performed by: INTERNAL MEDICINE

## 2024-11-22 PROCEDURE — 2580000003 HC RX 258: Performed by: NURSE PRACTITIONER

## 2024-11-22 PROCEDURE — 85014 HEMATOCRIT: CPT

## 2024-11-22 PROCEDURE — 85025 COMPLETE CBC W/AUTO DIFF WBC: CPT

## 2024-11-22 RX ORDER — ONDANSETRON 2 MG/ML
8 INJECTION INTRAMUSCULAR; INTRAVENOUS EVERY 8 HOURS
Status: DISPENSED | OUTPATIENT
Start: 2024-11-22 | End: 2024-11-29

## 2024-11-22 RX ORDER — DAUNORUBICIN HYDROCHLORIDE 5 MG/ML
120 INJECTION, SOLUTION INTRAVENOUS EVERY 24 HOURS
Status: COMPLETED | OUTPATIENT
Start: 2024-11-22 | End: 2024-11-24

## 2024-11-22 RX ORDER — SODIUM CHLORIDE 9 MG/ML
INJECTION, SOLUTION INTRAVENOUS PRN
Status: DISCONTINUED | OUTPATIENT
Start: 2024-11-22 | End: 2024-12-01

## 2024-11-22 RX ORDER — LORAZEPAM 2 MG/ML
0.5 INJECTION INTRAMUSCULAR EVERY 6 HOURS PRN
Status: DISCONTINUED | OUTPATIENT
Start: 2024-11-22 | End: 2024-12-18 | Stop reason: HOSPADM

## 2024-11-22 RX ADMIN — METOPROLOL SUCCINATE 25 MG: 25 TABLET, EXTENDED RELEASE ORAL at 09:53

## 2024-11-22 RX ADMIN — ONDANSETRON 8 MG: 2 INJECTION INTRAMUSCULAR; INTRAVENOUS at 17:01

## 2024-11-22 RX ADMIN — DAUNORUBICIN HYDROCHLORIDE 120 MG: 5 INJECTION, SOLUTION INTRAVENOUS at 17:31

## 2024-11-22 RX ADMIN — SODIUM CHLORIDE, PRESERVATIVE FREE 10 ML: 5 INJECTION INTRAVENOUS at 09:54

## 2024-11-22 RX ADMIN — DEXAMETHASONE SODIUM PHOSPHATE 12 MG: 4 INJECTION, SOLUTION INTRAMUSCULAR; INTRAVENOUS at 17:03

## 2024-11-22 RX ADMIN — CYTARABINE 200 MG: 100 INJECTION, SOLUTION INTRATHECAL; INTRAVENOUS; SUBCUTANEOUS at 17:34

## 2024-11-22 RX ADMIN — SODIUM CHLORIDE, PRESERVATIVE FREE 10 ML: 5 INJECTION INTRAVENOUS at 09:30

## 2024-11-22 NOTE — PLAN OF CARE
Problem: Infection - Adult  Goal: Absence of infection at discharge  Outcome: Progressing     Problem: Hematologic - Adult  Goal: Maintains hematologic stability  Outcome: Progressing

## 2024-11-22 NOTE — PROGRESS NOTES
[facility]  Office: [phone]  Carlos Ramírez DO, Jerry Washington DO, Keo Esquivel DO, Matthieu Steven DO, Janet Barr MD, Ivy Rome MD, Kimberlyn Jenkins MD, Deborah Dunbar MD,  Nick Garcia MD, Carlton Roth MD, Hellen Segura MD,  Cherry Dee DO, Carlos Doe MD, Cullen Damon MD, Alban Ramírez DO, Carol Wyatt MD,  Andrew Plata DO, Juanis Lees MD, Hui Laughlin MD, Mira Gee MD, Sonia Escobedo MD,  Gil Wheat MD, Chi Bang MD, Refugio Elliott MD, Livia Hinojosa MD, Sumit Michel MD, Mika Benjamin MD, Presley Hughes DO, Billy Pederson MD, Shirley Waterhouse, CNP,  Corin Kennedy CNP, Presley Billy CNP,  Sia Hancock, BESSY, Grace Romo, CNP, Nicole Vargas, CNP, Carolynn Mckinney, CNP, Clau Russell, CNP, Faye Siu PA-C, GÉNESIS UpC, Mandy Juárez, JIMENEZ, Mc Woo, CNP,  Lupe Ramirez, CNP, Valeria Tai, CNP,  Niru Man, JIMENEZ, Janessa Carter, CNP         IN-PATIENT SERVICE  [facility]    Progress Note    11/22/2024    12:47 PM    Name:   Osbaldo Duff  MRN:     [id number]     Acct:      [id number]   Room:   [facility] Day:  1  Admit Date:  11/21/2024  2:53 PM    PCP:   No primary care provider on file.  Code Status:  Full Code    Subjective:     C/C:   Chief Complaint   Patient presents with    Abnormal Lab     Interval History Status:   Comfortable  No fever, chills, sweats  Doing okay with diet    Data Base Updates:  WBC5.7k/uL RBC2.49 Low m/uL Hemoglobin 6.6 Low Panic   Platelet, [id number] Low     Retic Ct Pct0.0 Low      High     Echo:    Left Ventricle: Normal left ventricular systolic function with a   visually estimated EF of 50 - 55%. EF by 2D Simpsons Biplane is 58%. Left   ventricle size is normal. Normal wall thickness. Normal wall motion.   Global longitudinal strain is -16.4%. Normal diastolic function.     Aortic Valve: Trileaflet valve.     No significant valvular

## 2024-11-22 NOTE — PROGRESS NOTES
Occupational Therapy  Occupational Therapy Initial Evaluation  Facility/Department: [address] ONC/MED SURG   Patient Name: Osbaldo Duff        MRN: [id number]    : 1955    Date of Service: 2024    Discharge Recommendations   No therapy recommended at discharge.    OT Equipment Recommendations  Equipment Needed: No    Chief Complaint   Patient presents with    Abnormal Lab     Past Medical History:  has a past medical history of Acute erythroid leukemia (HCC), Anemia, Thrombocytopenia (HCC), and Wears dentures.  Past Surgical History:  has a past surgical history that includes Hand surgery; Inguinal hernia repair; and CT BIOPSY BONE MARROW (2024).    Assessment  Assessment: No acute OT deficits noted this date. Pt in agreeable to discharge from acute OT services. Please reorder OT if changes arise.  Prognosis: Good  Decision Making: Low Complexity  No Skilled OT: Independent with functional mobility;Independent with ADL's;No OT goals identified  REQUIRES OT FOLLOW-UP: No  Activity Tolerance  Activity Tolerance: Patient Tolerated treatment well  Safety Devices  Type of Devices: Call light within reach;Chair alarm in place;Gait belt;Left in chair;Nurse notified  Restraints  Restraints Initially in Place: No    Restrictions/Precautions  Restrictions/Precautions  Restrictions/Precautions: Up as Tolerated  Required Braces or Orthoses?: No    Subjective  General  Patient assessed for rehabilitation services?: Yes  Family / Caregiver Present: No  General Comment  Comments: RN ok'd OT eval this date. Pt pleasant, cooperative, and agreeable to therapy throughout entire session. Pt denying pain during session; engaging in functional activities and repositioned at end of session this date.       Home Setup/Prior Level of Function  Social/Functional History  Lives With: Spouse;Daughter;Son  Type of Home: House  Home Layout: Two level;Bed/Bath upstairs  Home Access: Stairs to enter without rails  Entrance Stairs  PM EST

## 2024-11-22 NOTE — PROCEDURES
PROCEDURE NOTE  Date: 11/21/2024   Name: Osbaldo Duff  YOB: 1955    Procedures    Picc placement order:    Benefits include stable, long term intravenous access. Blood draws. Peripheral vein preservation. Safely deliver vesicant medications.    Risks include failure to obtain the desired result(s) of the procedure, discomfort, injury, the need for additional procedures/therapies, permanent loss of body function, bleeding/bruising, arterial puncture, air embolism, nerve damage, hematoma, phlebitis, catheter fracture/rupture, catheter embolism, catheter occlusion, catheter migration, catheter site infection, unintentional/accidental removal of catheter, bloodstream infection, infiltration, cardiac arrhythmia, vein thrombosis, difficult catheter removal.   Alternatives discussed including centrally inserted central catheter, as well as less invasive procedures such as multiple peripheral IVs, extended dwell catheters and midline placements.     Consent obtained by proceduralist, signed by Patient    Prescribed therapy: Chemotherapy   Peripheral ultrasound assessment done. Plan for left brachial vein insertion.   Vein measurement = .53cm and area based CVR= 10.2%, preferable CVR based on area would be less than 20% (which correlates to less than 45% linear CVR).  Product type: Arrow non tapered power injectable PICC  History/Labs/Allergies Reviewed  Placed By: Yovani - RN IV Team  Assistant: Staff - RN  Time out Performed using Two Identifiers  Lot #: [id number]  Expiration date: 11/30/25  Catheter info: 5 Frisian - dual lumen  Total length: 41 cm  External catheter length: 1 cm  Extremity circumference at site = 30 cm  Number of attempts: 1  Estimated blood loss: 1 ml  Placement verified by: positive blood return & flushes easily  Special equipment used: [facility] ECG Tip tracker system, ultrasound, MST, and micro-introducer needle.   Catheter securement: StatLock  Catheter securement adhesive (SecureportIV)

## 2024-11-22 NOTE — PROGRESS NOTES
Physical Therapy  Facility/Department: [address] ONC/MED SURG  Physical Therapy Initial Assessment    Name: Osbaldo Duff  : 1955  MRN: [id number]  Date of Service: 2024  Chief Complaint   Patient presents with    Abnormal Lab      Discharge Recommendations:  No therapy recommended at discharge   PT Equipment Recommendations  Equipment Needed: No      Patient Diagnosis(es): The encounter diagnosis was Acute leukemia not having achieved remission (HCC).  Past Medical History:  has a past medical history of Acute erythroid leukemia (HCC), Anemia, Thrombocytopenia (HCC), and Wears dentures.  Past Surgical History:  has a past surgical history that includes Hand surgery; Inguinal hernia repair; and CT BIOPSY BONE MARROW (2024).    Assessment  Assessment: Pt performed well at therapy this date.  Pt was able to ambulate 300 feet with no AD and supervision, negotiate 8 stairs with L handrail and supervision.  Pt is at baseline functional mobility without additional acute care PT needs.  Pt is being discharged from PT at this time.  PT should be reordered with a change in medical status.  Pt is expected to be safe to return to prior living arrangements upon discharge based on today's session.  Therapy Prognosis: Good  Decision Making: Low Complexity  Requires PT Follow-Up: Yes  Activity Tolerance  Activity Tolerance: Patient tolerated treatment well    Plan  Physical Therapy Plan  General Plan: Discharge  Safety Devices  Type of Devices: Call light within reach, Chair alarm in place, Gait belt, Left in chair, Nurse notified  Restraints  Restraints Initially in Place: No    Restrictions  Restrictions/Precautions  Restrictions/Precautions: Up as Tolerated  Required Braces or Orthoses?: No     Subjective  General  Patient assessed for rehabilitation services?: Yes  Response To Previous Treatment: Not applicable  Family / Caregiver Present: No  Follows Commands: Within Functional Limits  Subjective  Subjective:  Pt supine in bed and agreeable to therapy, RN agreeable to therapy.  Pt pleasant and cooperative throughout.  Pt denies pain at rest.         Social/Functional History  Social/Functional History  Lives With: Spouse, Daughter, Son  Type of Home: House  Home Layout: Two level, Bed/Bath upstairs  Home Access: Stairs to enter without rails  Entrance Stairs - Number of Steps: Pt unsure how many, states \"a few\"  Bathroom Shower/Tub: Walk-in shower  Bathroom Toilet: Standard  Bathroom Equipment: Shower chair  Home Equipment:  (no DME use at a baseline.)  ADL Assistance: Independent  Homemaking Assistance: Independent  Homemaking Responsibilities: No (spouse does majority)  Ambulation Assistance: Independent  Transfer Assistance: Independent  Active : Yes  Mode of Transportation: Car  Occupation: Full time employment  Type of Occupation:   Leisure & Hobbies: spending time with cats  Additional Comments: Pt reports his wife is retired and able to provide assistance as needed.  Vision/Hearing  Vision  Vision: Impaired  Vision Exceptions: Wears glasses at all times  Hearing  Hearing: Within functional limits    Cognition   Cognition  Overall Cognitive Status: WFL    Objective               AROM RLE (degrees)  RLE AROM: WFL  AROM LLE (degrees)  LLE AROM : WFL  AROM RUE (degrees)  RUE AROM : WFL  AROM LUE (degrees)  LUE AROM : WFL  Strength RLE  Strength RLE: WFL  Comment: Grossly 5/5  Strength LLE  Strength LLE: WFL  Comment: Grossly 5/5  Strength RUE  Comment: Co-eval with OT, see OT note for UE detail.  Strength LUE  Comment: Co-eval with OT, see OT note for UE detail.      Bed mobility  Supine to Sit: Modified independent  Sit to Supine:  (pt retired up to a chair.)  Scooting: Independent  Bed Mobility Comments: HOB elevated without use of bedrails.  Transfers  Sit to Stand: Independent  Stand to Sit: Independent  Comment: Transfers performed with no AD.  Ambulation  Surface: Level tile  Device: No

## 2024-11-22 NOTE — PROGRESS NOTES
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    CHIEF COMPLAINT:    Chief Complaint   Patient presents with    Abnormal Lab       History Obtained From:  patient and chart  INTERVAL HISTORY:    Patient seen and examined  NO fever chills  ECHO noted  Consent signed  Plan to start chemo today    HISTORY OF PRESENT ILLNESS:    Osbaldo Duff is a 69 y.o. male who is admitted to the hospital on 11/21/2024  for anemia, low hb and acute leukemia diagnosis    Patient was following with Dr. Gallego as outpatient for possible suspected bone marrow pathology including MDS/leukemia.    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    Patient has pancytopenia.  Patient is now sent to ER for further management and likely administration of induction chemotherapy.    Brief oncologic history as follows  Current problems:  Probable MDS, awaiting second opinion from [facility]  Anemia and thrombocytopenia     Active and recent treatments:  Follow-up on second opinion from [facility]  Anticipating venetoclax and Vidaza     Summary of Case/History:  Osbaldo Duff a 69 y.o.male is a patient who admitted to the hospital due to abnormal lab workup.  History was obtained through an .  Patient understand very limited English.  Patient has not seen a doctor for a while.  Was seen by primary care provider due to complaints of fatigue also having low-grade fever.  Lab workup showed significantly abnormalities including severe thrombocytopenia and  asking us to see this patient.  Yevgeniy Odell MD  Hematologist/Medical Oncologist      This note is created with the assistance of a speech recognition program.  While intending to generate a document that actually reflects the content of the visit, the document can still have some errors including those of syntax and sound a like substitutions which may escape proof reading.  It such instances, actual meaning can be extrapolated by contextual diversion.

## 2024-11-23 PROBLEM — T45.1X5A MYELOSUPPRESSION AFTER CHEMOTHERAPY: Status: ACTIVE | Noted: 2024-11-23

## 2024-11-23 PROBLEM — N18.2 CHRONIC KIDNEY DISEASE, STAGE II (MILD): Status: ACTIVE | Noted: 2024-11-23

## 2024-11-23 PROBLEM — D75.89 MYELOSUPPRESSION AFTER CHEMOTHERAPY: Status: ACTIVE | Noted: 2024-11-23

## 2024-11-23 LAB
ANION GAP SERPL CALCULATED.3IONS-SCNC: 10 MMOL/L (ref 9–16)
BASOPHILS # BLD: 0 K/UL (ref 0–0.2)
BASOPHILS NFR BLD: 0 % (ref 0–2)
BUN SERPL-MCNC: 25 MG/DL (ref 8–23)
CALCIUM SERPL-MCNC: 9.2 MG/DL (ref 8.6–10.4)
CHLORIDE SERPL-SCNC: 106 MMOL/L (ref 98–107)
CO2 SERPL-SCNC: 21 MMOL/L (ref 20–31)
CREAT SERPL-MCNC: 1.4 MG/DL (ref 0.7–1.2)
EOSINOPHIL # BLD: 0.08 K/UL (ref 0–0.4)
EOSINOPHILS RELATIVE PERCENT: 1 % (ref 1–4)
ERYTHROCYTE [DISTWIDTH] IN BLOOD BY AUTOMATED COUNT: 20.4 % (ref 11.8–14.4)
GFR, ESTIMATED: 55 ML/MIN/1.73M2
GLUCOSE SERPL-MCNC: 132 MG/DL (ref 74–99)
HCT VFR BLD AUTO: 23.8 % (ref 40.7–50.3)
HGB BLD-MCNC: 7.6 G/DL (ref 13–17)
IMM GRANULOCYTES # BLD AUTO: 0.38 K/UL (ref 0–0.3)
IMM GRANULOCYTES NFR BLD: 5 %
LYMPHOCYTES NFR BLD: 0.68 K/UL (ref 1–4.8)
LYMPHOCYTES RELATIVE PERCENT: 9 % (ref 24–44)
MCH RBC QN AUTO: 26.8 PG (ref 25.2–33.5)
MCHC RBC AUTO-ENTMCNC: 31.9 G/DL (ref 28.4–34.8)
MCV RBC AUTO: 83.8 FL (ref 82.6–102.9)
MONOCYTES NFR BLD: 0.23 K/UL (ref 0.1–0.8)
MONOCYTES NFR BLD: 3 % (ref 1–7)
MORPHOLOGY: ABNORMAL
NEUTROPHILS NFR BLD: 82 % (ref 36–66)
NEUTS SEG NFR BLD: 6.13 K/UL (ref 1.8–7.7)
NRBC BLD-RTO: 0 PER 100 WBC
PLATELET # BLD AUTO: ABNORMAL K/UL (ref 138–453)
PLATELET, FLUORESCENCE: 13 K/UL (ref 138–453)
PLATELETS.RETICULATED NFR BLD AUTO: 10.9 % (ref 1.1–10.3)
POTASSIUM SERPL-SCNC: 5 MMOL/L (ref 3.7–5.3)
RBC # BLD AUTO: 2.84 M/UL (ref 4.21–5.77)
SODIUM SERPL-SCNC: 137 MMOL/L (ref 136–145)
URATE SERPL-MCNC: 8.2 MG/DL (ref 3.4–7)
WBC OTHER # BLD: 7.5 K/UL (ref 3.5–11.3)

## 2024-11-23 PROCEDURE — 2580000003 HC RX 258: Performed by: INTERNAL MEDICINE

## 2024-11-23 PROCEDURE — 84550 ASSAY OF BLOOD/URIC ACID: CPT

## 2024-11-23 PROCEDURE — 99232 SBSQ HOSP IP/OBS MODERATE 35: CPT | Performed by: INTERNAL MEDICINE

## 2024-11-23 PROCEDURE — 1200000000 HC SEMI PRIVATE

## 2024-11-23 PROCEDURE — 99233 SBSQ HOSP IP/OBS HIGH 50: CPT | Performed by: INTERNAL MEDICINE

## 2024-11-23 PROCEDURE — 85025 COMPLETE CBC W/AUTO DIFF WBC: CPT

## 2024-11-23 PROCEDURE — 85055 RETICULATED PLATELET ASSAY: CPT

## 2024-11-23 PROCEDURE — 6360000002 HC RX W HCPCS: Performed by: INTERNAL MEDICINE

## 2024-11-23 PROCEDURE — 36415 COLL VENOUS BLD VENIPUNCTURE: CPT

## 2024-11-23 PROCEDURE — 6370000000 HC RX 637 (ALT 250 FOR IP): Performed by: NURSE PRACTITIONER

## 2024-11-23 PROCEDURE — 80048 BASIC METABOLIC PNL TOTAL CA: CPT

## 2024-11-23 RX ADMIN — ONDANSETRON 8 MG: 2 INJECTION INTRAMUSCULAR; INTRAVENOUS at 16:30

## 2024-11-23 RX ADMIN — DEXAMETHASONE SODIUM PHOSPHATE 12 MG: 4 INJECTION, SOLUTION INTRAMUSCULAR; INTRAVENOUS at 16:34

## 2024-11-23 RX ADMIN — DAUNORUBICIN HYDROCHLORIDE 120 MG: 5 INJECTION, SOLUTION INTRAVENOUS at 17:06

## 2024-11-23 RX ADMIN — CYTARABINE 200 MG: 100 INJECTION, SOLUTION INTRATHECAL; INTRAVENOUS; SUBCUTANEOUS at 18:13

## 2024-11-23 RX ADMIN — METOPROLOL SUCCINATE 25 MG: 25 TABLET, EXTENDED RELEASE ORAL at 08:13

## 2024-11-23 RX ADMIN — ONDANSETRON 8 MG: 2 INJECTION INTRAMUSCULAR; INTRAVENOUS at 08:12

## 2024-11-23 RX ADMIN — ONDANSETRON 8 MG: 2 INJECTION INTRAMUSCULAR; INTRAVENOUS at 00:16

## 2024-11-23 NOTE — CARE COORDINATION
Case Management Assessment  Initial Evaluation    Date/Time of Evaluation: 11/23/2024 1:29 PM  Assessment Completed by: Huseyin Herrera    If patient is discharged prior to next notation, then this note serves as note for discharge by case management.    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  PCP verified by CM? Yes (Pt. states having a PCP but doesn't know the last name. He knows her first name is Madeleine)    Chart Reviewed: Yes      History Provided by: Patient  Patient Orientation: Alert and Oriented    Patient Cognition: Alert    Hospitalization in the last 30 days (Readmission):  Yes    If yes, Readmission Assessment in CM Navigator will be completed.    Advance Directives:      Code Status: Full Code   Patient's Primary Decision Maker is: Legal Next of Kin      Discharge Planning:    Patient lives with: Family Members, Spouse/Significant Other Type of Home: House  Primary Care Giver: Self  Patient Support Systems include: Spouse/Significant Other, Family Members   Current Financial resources: Other (Comment)  Current community resources: None  Current services prior to admission: None            Current DME:              Type of Home Care services:  None    ADLS  Prior functional level: Independent in ADLs/IADLs  Current functional level: Independent in ADLs/IADLs    PT AM-PAC: 24 /24  OT AM-PAC: 24 /24    Family can provide assistance at DC: Yes  Would you like Case Management to discuss the discharge plan with any other family members/significant others, and if so, who? No  Plans to Return to Present Housing: Yes  Other Identified

## 2024-11-23 NOTE — PLAN OF CARE
Problem: Infection - Adult  Goal: Absence of infection at discharge  Outcome: Progressing     Problem: Hematologic - Adult  Goal: Maintains hematologic stability  Outcome: Progressing     Problem: Safety - Adult  Goal: Free from fall injury  Outcome: Progressing

## 2024-11-23 NOTE — CARE COORDINATION
11/23/24 1324   Readmission Assessment   Number of Days since last admission? 8-30 days   Previous Disposition Home with Family   Who is being Interviewed Patient   What was the patient's/caregiver's perception as to why they think they needed to return back to the hospital? Other (Comment)   Did you visit your Primary Care Physician after you left the hospital, before you returned this time? No   Why weren't you able to visit your PCP? Other (Comment)  (Pt. was following up with specialist.)   Did you see a specialist, such as Cardiac, Pulmonary, Orthopedic Physician, etc. after you left the hospital? Yes   Who advised the patient to return to the hospital? Physician   Does the patient report anything that got in the way of taking their medications? No   In our efforts to provide the best possible care to you and others like you, can you think of anything that we could have done to help you after you left the hospital the first time, so that you might not have needed to return so soon? Other (Comment)  (Pt. denied)

## 2024-11-23 NOTE — PROGRESS NOTES
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    CHIEF COMPLAINT:    Chief Complaint   Patient presents with    Abnormal Lab       History Obtained From:  patient and chart  INTERVAL HISTORY:    Day 1 chemotherapy  Patient seen and examined  NO fever chills  ECHO noted  Started chemo therapy yesterday  Platelet 13  Hemoglobin 7.6    HISTORY OF PRESENT ILLNESS:    Osbaldo Duff is a 69 y.o. male who is admitted to the hospital on 11/21/2024  for anemia, low hb and acute leukemia diagnosis    Patient was following with Dr. Gallego as outpatient for possible suspected bone marrow pathology including MDS/leukemia.    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    Patient has pancytopenia.  Patient is now sent to ER for further management and likely administration of induction chemotherapy.    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  History was obtained through an .  Patient understand very limited English.  Patient has not seen a doctor for a while.  Was seen by primary care provider due to complaints of fatigue also having low-grade fever.  Lab workup showed significantly abnormalities  mL  5-40 mL IntraVENous PRN Miester, Mandy, APRN - NP        0.9 % sodium chloride infusion   IntraVENous PRN Miester, Mandy, APRN - NP        potassium chloride (KLOR-CON M) extended release tablet 40 mEq  40 mEq Oral PRN Miester, Mandy, APRN - NP        Or    potassium bicarb-citric acid (EFFER-K) effervescent tablet 40 mEq  40 mEq Oral PRN Miester, Mandy, APRN - NP        Or    potassium chloride 10 mEq/100 mL IVPB (Peripheral Line)  10 mEq IntraVENous PRN Miester, Mandy, APRN - NP        ondansetron (ZOFRAN-ODT) disintegrating tablet 4 mg  4 mg Oral Q8H PRN Miester, Mandy, APRN - NP        Or    ondansetron (ZOFRAN) injection 4 mg  4 mg IntraVENous Q6H PRN Miester, Mandy, APRN - NP        polyethylene glycol (GLYCOLAX) packet 17 g  17 g Oral Daily PRN Miester, Mandy, APRN - NP        bisacodyl (DULCOLAX) suppository 10 mg  10 mg Rectal Daily PRN Miester, Mandy, APRN - NP        acetaminophen (TYLENOL) tablet 650 mg  650 mg Oral Q6H PRN Miester, Mandy, APRN - NP   650 mg at 11/21/24 2224    Or    acetaminophen (TYLENOL) suppository 650 mg  650 mg Rectal Q6H PRN Miester, Mandy, APRN - NP        metoprolol succinate (TOPROL XL) extended release tablet 25 mg  25 mg Oral Daily Miester, Mandy, APRN - NP   25 mg at 11/23/24 0813    sodium chloride flush 0.9 % injection 5-40 mL  5-40 mL IntraVENous 2 times per day Yevgeniy Odell MD   10 mL at 11/22/24 0954    sodium chloride flush 0.9 % injection 5-40 mL  5-40 mL IntraVENous PRN Yevgeniy Odell MD        0.9 % sodium chloride infusion   IntraVENous PRN Yevgeniy Odell MD        lidocaine 1 % injection 50 mg  50 mg IntraDERmal Once Yevgeniy Odell MD           Allergies:  Patient has no known allergies.    Social History:   reports that he has never smoked. He has never used smokeless tobacco. He reports that he does not currently use alcohol. He reports that he does not use drugs.     Family History: family history includes Stroke in his father and

## 2024-11-23 NOTE — PROGRESS NOTES
[facility]  Office: [phone]  Carlos Ramírez DO, Jerry Washington DO, Keo Esquivel DO, Matthieu Steven DO, Janet Barr MD, Ivy Rome MD, Kimberlyn Jenkins MD, Deborah Dunbar MD,  Nick Garcia MD, Carlton Roth MD, Hellen Segura MD,  Cherry Dee DO, Carlos Doe MD, Cullen Damon MD, Alban Ramírez DO, Carol Wyatt MD,  Andrew Plata DO, Juanis Lees MD, Hui Laughlin MD, Mira Gee MD, Sonia Escobedo MD,  Gil Wheat MD, Chi Bang MD, Refugio Elliott MD, Livia Hinojosa MD, Sumit Michel MD, Mika Benjamin MD, Presley Hughes DO, Billy Pederson MD, Shirley Waterhouse, CNP,  Corin Kennedy CNP, Presley Billy, JIMENEZ,  Sia Hancock, BESSY, Grace Romo, CNP, Nicole Vargas, CNP, Carolynn Mckinney, CNP, Clau Russell, CNP, GÉNESIS ZambranoC, GÉNESIS UpC, Mandy Juárez, JIMENEZ, Mc Woo, CNP,  Lupe Ramirez, CNP, Valeria Tai, CNP,  Niru Man, JIMENEZ, Janessa Carter, CNP         IN-PATIENT SERVICE  [facility]    Progress Note    11/23/2024    2:27 PM    Name:   Osbaldo Duff  MRN:     [id number]     Acct:      [id number]   Room:   [address] [place] [address] Day:  2  Admit Date:  11/21/2024  2:53 PM    PCP:   No primary care provider on file.  Code Status:  Full Code    Subjective:     C/C:   Chief Complaint   Patient presents with    Abnormal Lab     Interval History Status:   Chemo initiated/tolerated  Patient ambulating about his room  Had blood transfused yesterday  No chills, sweats, malaise    Data Base Updates:  Tmax 99    WBC7.5k/uL RBC2.84 Low m/uL Hemoglobin7.6 Low   Platelet, Fluorescence 13 Low Panic     Uric Acid8.2 High     Crgjyct684 High mg/dL     BUN25 High mg/dL Creatinine1.4 High       Brief History:     As documented in the medical record:  \"Osbaldo Duff is a 69 y.o. Russian male pmh htn and recent dx of anemia/thrombocytopenia  who presents to ER as advised by his oncologist given concerns acute leukemia resulting  CONSULT TO VASCULAR ACCESS TEAM    Jerry Washington DO  11/23/2024  2:27 PM

## 2024-11-24 LAB
ALBUMIN SERPL-MCNC: 3.9 G/DL (ref 3.5–5.2)
ALBUMIN/GLOB SERPL: 1.7 {RATIO} (ref 1–2.5)
ALP SERPL-CCNC: 45 U/L (ref 40–129)
ALT SERPL-CCNC: 11 U/L (ref 10–50)
ANION GAP SERPL CALCULATED.3IONS-SCNC: 10 MMOL/L (ref 9–16)
AST SERPL-CCNC: 10 U/L (ref 10–50)
BASOPHILS # BLD: 0 K/UL (ref 0–0.2)
BASOPHILS NFR BLD: 0 % (ref 0–2)
BILIRUB SERPL-MCNC: 0.6 MG/DL (ref 0–1.2)
BUN SERPL-MCNC: 36 MG/DL (ref 8–23)
CALCIUM SERPL-MCNC: 9 MG/DL (ref 8.6–10.4)
CHLORIDE SERPL-SCNC: 107 MMOL/L (ref 98–107)
CO2 SERPL-SCNC: 20 MMOL/L (ref 20–31)
CREAT SERPL-MCNC: 1.5 MG/DL (ref 0.7–1.2)
EOSINOPHIL # BLD: 0.07 K/UL (ref 0–0.44)
EOSINOPHILS RELATIVE PERCENT: 1 % (ref 1–4)
ERYTHROCYTE [DISTWIDTH] IN BLOOD BY AUTOMATED COUNT: 19.1 % (ref 11.8–14.4)
ERYTHROCYTE [DISTWIDTH] IN BLOOD BY AUTOMATED COUNT: 20.4 % (ref 11.8–14.4)
GFR, ESTIMATED: 50 ML/MIN/1.73M2
GLUCOSE SERPL-MCNC: 103 MG/DL (ref 74–99)
HCT VFR BLD AUTO: 21.3 % (ref 40.7–50.3)
HCT VFR BLD AUTO: 22.9 % (ref 40.7–50.3)
HGB BLD-MCNC: 6.8 G/DL (ref 13–17)
HGB BLD-MCNC: 7.5 G/DL (ref 13–17)
IMM GRANULOCYTES # BLD AUTO: 0.14 K/UL (ref 0–0.3)
IMM GRANULOCYTES NFR BLD: 2 %
LYMPHOCYTES NFR BLD: 0.63 K/UL (ref 1.1–3.7)
LYMPHOCYTES RELATIVE PERCENT: 9 % (ref 24–43)
MCH RBC QN AUTO: 26.3 PG (ref 25.2–33.5)
MCH RBC QN AUTO: 27.1 PG (ref 25.2–33.5)
MCHC RBC AUTO-ENTMCNC: 31.9 G/DL (ref 28.4–34.8)
MCHC RBC AUTO-ENTMCNC: 32.8 G/DL (ref 28.4–34.8)
MCV RBC AUTO: 82.2 FL (ref 82.6–102.9)
MCV RBC AUTO: 82.7 FL (ref 82.6–102.9)
MONOCYTES NFR BLD: 0.14 K/UL (ref 0.1–1.2)
MONOCYTES NFR BLD: 2 % (ref 3–12)
MORPHOLOGY: ABNORMAL
NEUTROPHILS NFR BLD: 86 % (ref 36–65)
NEUTS SEG NFR BLD: 6.02 K/UL (ref 1.5–8.1)
NRBC BLD-RTO: 0 PER 100 WBC
NRBC BLD-RTO: 0 PER 100 WBC
PLATELET # BLD AUTO: ABNORMAL K/UL (ref 138–453)
PLATELET # BLD AUTO: ABNORMAL K/UL (ref 138–453)
PLATELET, FLUORESCENCE: 21 K/UL (ref 138–453)
PLATELET, FLUORESCENCE: 7 K/UL (ref 138–453)
PLATELETS.RETICULATED NFR BLD AUTO: 14.9 % (ref 1.1–10.3)
PLATELETS.RETICULATED NFR BLD AUTO: 5.1 % (ref 1.1–10.3)
POTASSIUM SERPL-SCNC: 4.5 MMOL/L (ref 3.7–5.3)
PROT SERPL-MCNC: 6.2 G/DL (ref 6.6–8.7)
RBC # BLD AUTO: 2.59 M/UL (ref 4.21–5.77)
RBC # BLD AUTO: 2.77 M/UL (ref 4.21–5.77)
SODIUM SERPL-SCNC: 137 MMOL/L (ref 136–145)
WBC OTHER # BLD: 5.4 K/UL (ref 3.5–11.3)
WBC OTHER # BLD: 7 K/UL (ref 3.5–11.3)

## 2024-11-24 PROCEDURE — 6370000000 HC RX 637 (ALT 250 FOR IP): Performed by: NURSE PRACTITIONER

## 2024-11-24 PROCEDURE — 2580000003 HC RX 258: Performed by: NURSE PRACTITIONER

## 2024-11-24 PROCEDURE — 80053 COMPREHEN METABOLIC PANEL: CPT

## 2024-11-24 PROCEDURE — 85025 COMPLETE CBC W/AUTO DIFF WBC: CPT

## 2024-11-24 PROCEDURE — 99231 SBSQ HOSP IP/OBS SF/LOW 25: CPT | Performed by: INTERNAL MEDICINE

## 2024-11-24 PROCEDURE — 85027 COMPLETE CBC AUTOMATED: CPT

## 2024-11-24 PROCEDURE — 85055 RETICULATED PLATELET ASSAY: CPT

## 2024-11-24 PROCEDURE — P9073 PLATELETS PHERESIS PATH REDU: HCPCS

## 2024-11-24 PROCEDURE — 2580000003 HC RX 258: Performed by: INTERNAL MEDICINE

## 2024-11-24 PROCEDURE — 1200000000 HC SEMI PRIVATE

## 2024-11-24 PROCEDURE — 36430 TRANSFUSION BLD/BLD COMPNT: CPT

## 2024-11-24 PROCEDURE — 6360000002 HC RX W HCPCS: Performed by: INTERNAL MEDICINE

## 2024-11-24 PROCEDURE — P9016 RBC LEUKOCYTES REDUCED: HCPCS

## 2024-11-24 PROCEDURE — 99232 SBSQ HOSP IP/OBS MODERATE 35: CPT | Performed by: INTERNAL MEDICINE

## 2024-11-24 PROCEDURE — 36415 COLL VENOUS BLD VENIPUNCTURE: CPT

## 2024-11-24 RX ORDER — SODIUM CHLORIDE 9 MG/ML
INJECTION, SOLUTION INTRAVENOUS PRN
Status: DISCONTINUED | OUTPATIENT
Start: 2024-11-24 | End: 2024-12-01

## 2024-11-24 RX ADMIN — DEXAMETHASONE SODIUM PHOSPHATE 12 MG: 4 INJECTION, SOLUTION INTRAMUSCULAR; INTRAVENOUS at 17:57

## 2024-11-24 RX ADMIN — DAUNORUBICIN HYDROCHLORIDE 120 MG: 5 INJECTION, SOLUTION INTRAVENOUS at 18:08

## 2024-11-24 RX ADMIN — SODIUM CHLORIDE, PRESERVATIVE FREE 10 ML: 5 INJECTION INTRAVENOUS at 20:18

## 2024-11-24 RX ADMIN — METOPROLOL SUCCINATE 25 MG: 25 TABLET, EXTENDED RELEASE ORAL at 09:14

## 2024-11-24 RX ADMIN — SODIUM CHLORIDE, PRESERVATIVE FREE 10 ML: 5 INJECTION INTRAVENOUS at 09:17

## 2024-11-24 RX ADMIN — ONDANSETRON 8 MG: 2 INJECTION INTRAMUSCULAR; INTRAVENOUS at 17:52

## 2024-11-24 RX ADMIN — ONDANSETRON 8 MG: 2 INJECTION INTRAMUSCULAR; INTRAVENOUS at 09:14

## 2024-11-24 RX ADMIN — ONDANSETRON 8 MG: 2 INJECTION INTRAMUSCULAR; INTRAVENOUS at 23:10

## 2024-11-24 RX ADMIN — CYTARABINE 200 MG: 100 INJECTION, SOLUTION INTRATHECAL; INTRAVENOUS; SUBCUTANEOUS at 18:29

## 2024-11-24 RX ADMIN — ONDANSETRON 8 MG: 2 INJECTION INTRAMUSCULAR; INTRAVENOUS at 00:50

## 2024-11-24 NOTE — PROGRESS NOTES
[facility]  Office: [phone]  Carlos Ramírez DO, Jerry Washington DO, Keo Esquivel DO, Matthieu Steven DO, Janet Barr MD, Ivy Rome MD, Kimberlyn Jenkins MD, Deborah Dunbar MD,  Nick Garcia MD, Carlton Roth MD, Hellen Segura MD,  Cherry Dee DO, Carlos Doe MD, Cullen Damon MD, Alban Ramírez DO, Carol Wyatt MD,  Andrew Plata DO, Juanis Lees MD, Hui Laughlin MD, Mira Gee MD, Sonia Escobedo MD,  Gil Wheat MD, Chi Bang MD, Refugio Elliott MD, Livia Hinojosa MD, Sumit Michel MD, Mika Benjamin MD, Presley Hughes DO, Billy Pederson MD, Shirley Waterhouse, CNP,  Corin Kennedy CNP, Presley Billy, JIMENEZ,  Sia Hancock, BESSY, Grace Romo, CNP, Nicole Vargas, CNP, Carolynn Mckinney, CNP, Clau Russell, CNP, Faye Siu PA-C, GÉNESIS UpC, Mandy Juárez, JIMENEZ, Mc Woo, CNP,  Lupe Ramirez, CNP, Vaelria Tai, CNP,  Niru Man, JIMENEZ, Janessa Carter, CNP         IN-PATIENT SERVICE  [facility]    Progress Note    11/24/2024    2:53 PM    Name:   Osbaldo Duff  MRN:     [id number]     Acct:      [id number]   Room:   [facility] Day:  3  Admit Date:  11/21/2024  2:53 PM    PCP:   No primary care provider on file.  Code Status:  Full Code    Subjective:     C/C:   Chief Complaint   Patient presents with    Abnormal Lab     Interval History Status:   Symptomatically stable  No nausea, vomiting, diarrhea  Doing okay with diet  Tolerating chemo  RBC / Platelet transfusion in progress    Data Base Updates:  Afebrile    WBC7.0k/uL RBC2.59 Low m/uL Hemoglobin 6.8 Low Panic  g/dL Ohgjtniqmb76.3 Low % MCV82.2 Low fL MCH26.3pg MCHC31.9g/dL RDW20.4 High % PlateletsSee Reflexed IPF Resultk/uL   Platelet, Fluorescence 7 Low Panic     BUN36 High mg/dL Creatinine1.5 High     Brief History:     As documented in the medical record:  \"Osbaldo Duff is a 69 y.o. [place] male pmh htn and recent dx of anemia/thrombocytopenia

## 2024-11-24 NOTE — PROGRESS NOTES
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    CHIEF COMPLAINT:    Chief Complaint   Patient presents with    Abnormal Lab       History Obtained From:  patient and chart  INTERVAL HISTORY:    Day 2 chemotherapy  Patient seen and examined  NO fever chills  ECHO noted  Started chemo therapy yesterday  Platelet 7  Hemoglobin 6.8    HISTORY OF PRESENT ILLNESS:    Osbaldo Duff is a 69 y.o. male who is admitted to the hospital on 11/21/2024  for anemia, low hb and acute leukemia diagnosis    Patient was following with Dr. Gallego as outpatient for possible suspected bone marrow pathology including MDS/leukemia.    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    Patient has pancytopenia.  Patient is now sent to ER for further management and likely administration of induction chemotherapy.    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  History was obtained through an .  Patient understand very limited English.  Patient has not seen a doctor for a while.  Was seen by primary care provider due to complaints of fatigue also having low-grade fever.  Lab workup showed significantly abnormalities  hemoglobin less than 7 and transfuse platelets if less than 10K as per leukemia treatment protocol  Continue chemotherapy  Monitor closely  Patient and the family had a number of reasonable questions which were answered to their satisfaction.  They verbalized understanding of the information provided and they agreed to proceed as outlined above.       Discussed with patient and family and Nurse.                                  Lynette Oliver MD                          [facility]                            This note is created with the assistance of a speech recognition program.  While intending to generate a document that actually reflects the content of the visit, the document can still have some errors including those of syntax and sound a like substitutions which may escape proof reading.  It such instances, actual meaning can be extrapolated by contextual diversion.

## 2024-11-25 ENCOUNTER — TELEPHONE (OUTPATIENT)
Dept: INFUSION THERAPY | Age: 69
End: 2024-11-25

## 2024-11-25 ENCOUNTER — HOSPITAL ENCOUNTER (OUTPATIENT)
Dept: INFUSION THERAPY | Facility: MEDICAL CENTER | Age: 69
Discharge: HOME | End: 2024-11-25

## 2024-11-25 LAB
ABO/RH: NORMAL
ALBUMIN SERPL-MCNC: 3.7 G/DL (ref 3.5–5.2)
ALBUMIN/GLOB SERPL: 1.6 {RATIO} (ref 1–2.5)
ALP SERPL-CCNC: 43 U/L (ref 40–129)
ALT SERPL-CCNC: 7 U/L (ref 10–50)
ANION GAP SERPL CALCULATED.3IONS-SCNC: 11 MMOL/L (ref 9–16)
ANTIBODY SCREEN: NEGATIVE
ARM BAND NUMBER: NORMAL
AST SERPL-CCNC: 8 U/L (ref 10–50)
BASOPHILS # BLD: 0.06 K/UL (ref 0–0.2)
BASOPHILS NFR BLD: 1 % (ref 0–2)
BILIRUB SERPL-MCNC: 0.7 MG/DL (ref 0–1.2)
BLOOD BANK BLOOD PRODUCT EXPIRATION DATE: NORMAL
BLOOD BANK DISPENSE STATUS: NORMAL
BLOOD BANK ISBT PRODUCT BLOOD TYPE: 5100
BLOOD BANK PRODUCT CODE: NORMAL
BLOOD BANK SAMPLE EXPIRATION: NORMAL
BLOOD BANK UNIT TYPE AND RH: NORMAL
BPU ID: NORMAL
BUN SERPL-MCNC: 32 MG/DL (ref 8–23)
CALCIUM SERPL-MCNC: 8.8 MG/DL (ref 8.6–10.4)
CHLORIDE SERPL-SCNC: 108 MMOL/L (ref 98–107)
CO2 SERPL-SCNC: 19 MMOL/L (ref 20–31)
COMPONENT: NORMAL
CREAT SERPL-MCNC: 1.3 MG/DL (ref 0.7–1.2)
CROSSMATCH RESULT: NORMAL
CROSSMATCH RESULT: NORMAL
EOSINOPHIL # BLD: 0 K/UL (ref 0–0.4)
EOSINOPHILS RELATIVE PERCENT: 0 % (ref 1–4)
ERYTHROCYTE [DISTWIDTH] IN BLOOD BY AUTOMATED COUNT: 19.5 % (ref 11.8–14.4)
GFR, ESTIMATED: 60 ML/MIN/1.73M2
GLUCOSE SERPL-MCNC: 116 MG/DL (ref 74–99)
HCT VFR BLD AUTO: 24.7 % (ref 40.7–50.3)
HGB BLD-MCNC: 7.7 G/DL (ref 13–17)
IMM GRANULOCYTES # BLD AUTO: 0.06 K/UL (ref 0–0.3)
IMM GRANULOCYTES NFR BLD: 1 %
LYMPHOCYTES NFR BLD: 0.38 K/UL (ref 1–4.8)
LYMPHOCYTES RELATIVE PERCENT: 6 % (ref 24–44)
MCH RBC QN AUTO: 26.6 PG (ref 25.2–33.5)
MCHC RBC AUTO-ENTMCNC: 31.2 G/DL (ref 28.4–34.8)
MCV RBC AUTO: 85.2 FL (ref 82.6–102.9)
MONOCYTES NFR BLD: 0.06 K/UL (ref 0.1–0.8)
MONOCYTES NFR BLD: 1 % (ref 1–7)
MORPHOLOGY: ABNORMAL
NEUTROPHILS NFR BLD: 91 % (ref 36–66)
NEUTS SEG NFR BLD: 5.84 K/UL (ref 1.8–7.7)
NRBC BLD-RTO: 0 PER 100 WBC
PLATELET # BLD AUTO: ABNORMAL K/UL (ref 138–453)
PLATELET, FLUORESCENCE: 17 K/UL (ref 138–453)
PLATELETS.RETICULATED NFR BLD AUTO: 5.5 % (ref 1.1–10.3)
POTASSIUM SERPL-SCNC: 4.6 MMOL/L (ref 3.7–5.3)
PROT SERPL-MCNC: 6 G/DL (ref 6.6–8.7)
RBC # BLD AUTO: 2.9 M/UL (ref 4.21–5.77)
SODIUM SERPL-SCNC: 138 MMOL/L (ref 136–145)
TRANSFUSION STATUS: NORMAL
UNIT DIVISION: 0
UNIT ISSUE DATE/TIME: NORMAL
URATE SERPL-MCNC: 8.6 MG/DL (ref 3.4–7)
WBC OTHER # BLD: 6.4 K/UL (ref 3.5–11.3)

## 2024-11-25 PROCEDURE — 99231 SBSQ HOSP IP/OBS SF/LOW 25: CPT | Performed by: INTERNAL MEDICINE

## 2024-11-25 PROCEDURE — 80053 COMPREHEN METABOLIC PANEL: CPT

## 2024-11-25 PROCEDURE — 2580000003 HC RX 258: Performed by: INTERNAL MEDICINE

## 2024-11-25 PROCEDURE — 36415 COLL VENOUS BLD VENIPUNCTURE: CPT

## 2024-11-25 PROCEDURE — 85055 RETICULATED PLATELET ASSAY: CPT

## 2024-11-25 PROCEDURE — 6370000000 HC RX 637 (ALT 250 FOR IP): Performed by: NURSE PRACTITIONER

## 2024-11-25 PROCEDURE — 85025 COMPLETE CBC W/AUTO DIFF WBC: CPT

## 2024-11-25 PROCEDURE — 84550 ASSAY OF BLOOD/URIC ACID: CPT

## 2024-11-25 PROCEDURE — 6360000002 HC RX W HCPCS: Performed by: INTERNAL MEDICINE

## 2024-11-25 PROCEDURE — 1200000000 HC SEMI PRIVATE

## 2024-11-25 PROCEDURE — 99233 SBSQ HOSP IP/OBS HIGH 50: CPT | Performed by: INTERNAL MEDICINE

## 2024-11-25 RX ADMIN — SODIUM CHLORIDE, PRESERVATIVE FREE 10 ML: 5 INJECTION INTRAVENOUS at 08:41

## 2024-11-25 RX ADMIN — ONDANSETRON 8 MG: 2 INJECTION INTRAMUSCULAR; INTRAVENOUS at 08:39

## 2024-11-25 RX ADMIN — ONDANSETRON 8 MG: 2 INJECTION INTRAMUSCULAR; INTRAVENOUS at 18:35

## 2024-11-25 RX ADMIN — DEXAMETHASONE SODIUM PHOSPHATE 12 MG: 4 INJECTION, SOLUTION INTRAMUSCULAR; INTRAVENOUS at 18:44

## 2024-11-25 RX ADMIN — CYTARABINE 200 MG: 100 INJECTION, SOLUTION INTRATHECAL; INTRAVENOUS; SUBCUTANEOUS at 20:24

## 2024-11-25 RX ADMIN — METOPROLOL SUCCINATE 25 MG: 25 TABLET, EXTENDED RELEASE ORAL at 08:38

## 2024-11-25 ASSESSMENT — PAIN SCALES - GENERAL
PAINLEVEL_OUTOF10: 0
PAINLEVEL_OUTOF10: 0

## 2024-11-25 NOTE — CARE COORDINATION
[facility] [facility] Quality Flow/Interdisciplinary Rounds Progress Note    Quality Flow Rounds held on November 25, 2024 at 0930    Disciplines Attending:  Bedside Nurse, , and Nursing Unit Leadership    Barriers to Discharge: medical clearance    Anticipated Discharge Date:       Anticipated Discharge Disposition:home with family    Readmission Risk              Risk of Unplanned Readmission:  19           Discussed patient goal for the day, patient clinical progression, and barriers to discharge.  The following Goal(s) of the Day/Commitment(s) have been identified:  Activity Progression  day 4 of 7 chemo, will remain in hospital approx 20 days after, continue to follow for needs, goal is home with family      Sierra Powers  November 25, 2024

## 2024-11-25 NOTE — PLAN OF CARE
Problem: Infection - Adult  Goal: Absence of infection at discharge  11/25/2024 0502 by Cassandra Elizabeth RN  Outcome: Progressing  11/24/2024 1702 by Shannon Webb RN  Outcome: Progressing     Problem: Hematologic - Adult  Goal: Maintains hematologic stability  11/25/2024 0502 by Cassandra Elizabeth RN  Outcome: Progressing  11/24/2024 1702 by Shannon Webb RN  Outcome: Progressing     Problem: Safety - Adult  Goal: Free from fall injury  11/25/2024 0502 by Cassandra Elizabeth RN  Outcome: Progressing  11/24/2024 1702 by Shannon Webb RN  Outcome: Progressing

## 2024-11-25 NOTE — PROGRESS NOTES
[facility]  Office: [phone]  Carlos Ramírez DO, Jerry Washington DO, Koe Esquivel DO, Matthieu Steven DO, Janet Barr MD, Ivy Rome MD, Kimberlyn Jnekins MD, Deborah Dunbar MD,  Nick Garcia MD, Carlton Roth MD, Hellen Segura MD,  Cherry Dee DO, Carlos Doe MD, Cullen Damon MD, Alban Ramírez DO, Carol Wyatt MD,  Andrew Plata DO, Juanis Lees MD, Hui Laughlin MD, Mira Gee MD, Sonia Escobedo MD,  Gil Wheat MD, Chi Bang MD, Refugio Elliott MD, Livia Hinojosa MD, Sumit Michel MD, Mika Benjamin MD, Presley Hughes DO, Billy Pederson MD, Shirley Waterhouse, CNP,  Corin Kennedy CNP, Presley Billy, JIMENEZ,  Sia Hancock, BESSY, Grace Romo, CNP, Nicole Vargas, CNP, Carolynn Mckinney, CNP, Clau Russell, CNP, Faye Siu PA-C, Elena Rosen PA-C, Mandy Juárez, JIMENEZ, Mc Woo, JIMENEZ,  Lupe Ramirez, CNP, Valeria Tai, CNP,  Niru Man CNP, Janessa Carter, CNP  Induction chemo continues       IN-PATIENT SERVICE  [facility]    Progress Note    11/25/2024    4:06 PM    Name:   Osbaldo Duff  MRN:     [id number]     Acct:      [id number]   Room:   [facility] Day:  4  Admit Date:  11/21/2024  2:53 PM    PCP:   No primary care provider on file.  Code Status:  Full Code    Subjective:     C/C:   Chief Complaint   Patient presents with    Abnormal Lab     Interval History Status:   Induction chemo continues  Patient tolerating regimen  No nausea, vomiting, diarrhea  Doing okay with diet  No fever chills or sweats    Data Base Updates:  Afebrile    WBC6.4k/uL RBC2.90 Low m/uL Hemoglobin7.7 Low g/dL Lbsvgfmbmj45.7 Low % MCV85.2fL MCH26.6pg MCHC31.2g/dL RDW19.5 High % PlateletsSee Reflexed IPF Resultk/uL Platelet, Fluorescence 17 Low Panic     Jzlcav582dtuq/L Potassium4.6mmol/L Bdryatvs168 High mmol/L  Low mmol/L Anion Yrg07rzbz/L     Luqnlvn134 High mg/dL     BUN32 High mg/dL Creatinine1.3 High     Uric Acid8.6  High     Brief History:     As documented in the medical record:  \"Osbaldo Duff is a 69 y.o. [place] male pmh htn and recent dx of anemia/thrombocytopenia  who presents to ER as advised by his oncologist given concerns acute leukemia resulting from his bone marrow biopsy 11/11/2024. Of note, He presented the beginning of November with complaints of fatigue, SOB  and low-grade temperature and was Admitted 11/1/2024-11/2/2024 for acute anemia (hbg=6.5 & platelets =18 )thrombocytopenia. Received transfusions and given Decadron as per hematology oncology recommendation.  He has since been following with them and receiving outpatient transfusions as well.    and is admitted to the hospital for the management of suspected acute leukemia  \"    The intitial assessment and plan included:  \"   * (Principal) Acute leukemia, in relapse (HCC) 11/21/2024 Yes     Acute anemia 11/21/2024 Yes     Thrombocytopenia (HCC) 11/21/2024 Yes     HTN (hypertension) 11/21/2024 Yes      Plan:   Patient status inpatient in the Med/Surge   MDS --> s/p bone marrow 11/11/2024 c/f acute leukemia. Oncology consulted for further management   Anemia & thrombocytopenia- 2/2 suspected AML versus MDS-- await oncology confirmation                Typed and screened today              Transfuse prn   3. Htn- controlled- resume home toprol 25mg po q d   4. Dvt ppx- scds.  No chemical prophylaxis given thrombocytopenia\"     Database has included:  Bone marrow results:  The aberrations observed are seen in myeloid disorders including AML  and MDS.  The complexity of this chromosome complement likely  indicates a poor prognosis.  See separate report [id number] for  additional details.   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  See comment.  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  See comment.     Echocardiogram:  Echo:    Left Ventricle: Normal

## 2024-11-25 NOTE — PROGRESS NOTES
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    CHIEF COMPLAINT:    Chief Complaint   Patient presents with    Abnormal Lab       History Obtained From:  patient and chart  INTERVAL HISTORY:    Day 3 chemotherapy  Patient seen and examined  NO fever chills  ECHO noted    Platelet 17  Hemoglobin     HISTORY OF PRESENT ILLNESS:    Osbaldo Duff is a 69 y.o. male who is admitted to the hospital on 11/21/2024  for anemia, low hb and acute leukemia diagnosis    Patient was following with Dr. Gallego as outpatient for possible suspected bone marrow pathology including MDS/leukemia.    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    Patient has pancytopenia.  Patient is now sent to ER for further management and likely administration of induction chemotherapy.    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  History was obtained through an .  Patient understand very limited English.  Patient has not seen a doctor for a while.  Was seen by primary care provider due to complaints of fatigue also having low-grade fever.  Lab workup showed significantly abnormalities including severe thrombocytopenia and  IntraVENous PRN Miester, Mandy, APRN - NP        potassium chloride (KLOR-CON M) extended release tablet 40 mEq  40 mEq Oral PRN Miester, Mandy, APRN - NP        Or    potassium bicarb-citric acid (EFFER-K) effervescent tablet 40 mEq  40 mEq Oral PRN Miester, Mandy, APRN - NP        Or    potassium chloride 10 mEq/100 mL IVPB (Peripheral Line)  10 mEq IntraVENous PRN Miester, Mandy, APRN - NP        ondansetron (ZOFRAN-ODT) disintegrating tablet 4 mg  4 mg Oral Q8H PRN Miester, Mandy, APRN - NP        Or    ondansetron (ZOFRAN) injection 4 mg  4 mg IntraVENous Q6H PRN Miester, Mandy, APRN - NP        polyethylene glycol (GLYCOLAX) packet 17 g  17 g Oral Daily PRN Miester, Mandy, APRN - NP        bisacodyl (DULCOLAX) suppository 10 mg  10 mg Rectal Daily PRN Miester, Mandy, APRN - NP        acetaminophen (TYLENOL) tablet 650 mg  650 mg Oral Q6H PRN Miester, Mandy, APRN - NP   650 mg at 11/21/24 2224    Or    acetaminophen (TYLENOL) suppository 650 mg  650 mg Rectal Q6H PRN Miester, Mandy, APRN - NP        metoprolol succinate (TOPROL XL) extended release tablet 25 mg  25 mg Oral Daily Miester, Mandy, APRN - NP   25 mg at 11/25/24 0838    sodium chloride flush 0.9 % injection 5-40 mL  5-40 mL IntraVENous 2 times per day Yevgeniy Odell MD   10 mL at 11/25/24 0841    sodium chloride flush 0.9 % injection 5-40 mL  5-40 mL IntraVENous PRN Yevgeniy Odell MD        0.9 % sodium chloride infusion   IntraVENous PRN Yevgeniy Odell MD        lidocaine 1 % injection 50 mg  50 mg IntraDERmal Once Yevgeniy Odell MD           Allergies:  Patient has no known allergies.    Social History:   reports that he has never smoked. He has never used smokeless tobacco. He reports that he does not currently use alcohol. He reports that he does not use drugs.     Family History: family history includes Stroke in his father and mother.    REVIEW OF SYSTEMS:    Constitutional: No fever or chills. No night sweats, no weight loss   Eyes:

## 2024-11-26 LAB
ALBUMIN SERPL-MCNC: 3.7 G/DL (ref 3.5–5.2)
ALBUMIN/GLOB SERPL: 1.5 {RATIO} (ref 1–2.5)
ALP SERPL-CCNC: 40 U/L (ref 40–129)
ALT SERPL-CCNC: 5 U/L (ref 10–50)
ANION GAP SERPL CALCULATED.3IONS-SCNC: 10 MMOL/L (ref 9–16)
AST SERPL-CCNC: 7 U/L (ref 10–50)
BASOPHILS # BLD: 0 K/UL (ref 0–0.2)
BASOPHILS NFR BLD: 0 % (ref 0–2)
BILIRUB SERPL-MCNC: 0.9 MG/DL (ref 0–1.2)
BUN SERPL-MCNC: 34 MG/DL (ref 8–23)
CALCIUM SERPL-MCNC: 9 MG/DL (ref 8.6–10.4)
CHLORIDE SERPL-SCNC: 108 MMOL/L (ref 98–107)
CO2 SERPL-SCNC: 20 MMOL/L (ref 20–31)
CREAT SERPL-MCNC: 1.2 MG/DL (ref 0.7–1.2)
EOSINOPHIL # BLD: 0 K/UL (ref 0–0.4)
EOSINOPHILS RELATIVE PERCENT: 0 % (ref 1–4)
ERYTHROCYTE [DISTWIDTH] IN BLOOD BY AUTOMATED COUNT: 18.7 % (ref 11.8–14.4)
GFR, ESTIMATED: 66 ML/MIN/1.73M2
GLUCOSE SERPL-MCNC: 136 MG/DL (ref 74–99)
HCT VFR BLD AUTO: 24.1 % (ref 40.7–50.3)
HGB BLD-MCNC: 7.6 G/DL (ref 13–17)
IMM GRANULOCYTES # BLD AUTO: 0.1 K/UL (ref 0–0.3)
IMM GRANULOCYTES NFR BLD: 2 %
LYMPHOCYTES NFR BLD: 0.1 K/UL (ref 1–4.8)
LYMPHOCYTES RELATIVE PERCENT: 2 % (ref 24–44)
MCH RBC QN AUTO: 26.6 PG (ref 25.2–33.5)
MCHC RBC AUTO-ENTMCNC: 31.5 G/DL (ref 28.4–34.8)
MCV RBC AUTO: 84.3 FL (ref 82.6–102.9)
MISCELLANEOUS LAB TEST RESULT: NORMAL
MONOCYTES NFR BLD: 0 % (ref 1–7)
MONOCYTES NFR BLD: 0 K/UL (ref 0.1–0.8)
MORPHOLOGY: ABNORMAL
NEUTROPHILS NFR BLD: 96 % (ref 36–66)
NEUTS SEG NFR BLD: 4.9 K/UL (ref 1.8–7.7)
NRBC BLD-RTO: 0 PER 100 WBC
PLATELET # BLD AUTO: ABNORMAL K/UL (ref 138–453)
PLATELET, FLUORESCENCE: 9 K/UL (ref 138–453)
PLATELETS.RETICULATED NFR BLD AUTO: 10.2 % (ref 1.1–10.3)
POTASSIUM SERPL-SCNC: 4.7 MMOL/L (ref 3.7–5.3)
PROT SERPL-MCNC: 6.1 G/DL (ref 6.6–8.7)
RBC # BLD AUTO: 2.86 M/UL (ref 4.21–5.77)
SODIUM SERPL-SCNC: 138 MMOL/L (ref 136–145)
TEST NAME: NORMAL
WBC OTHER # BLD: 5.1 K/UL (ref 3.5–11.3)

## 2024-11-26 PROCEDURE — 99232 SBSQ HOSP IP/OBS MODERATE 35: CPT | Performed by: STUDENT IN AN ORGANIZED HEALTH CARE EDUCATION/TRAINING PROGRAM

## 2024-11-26 PROCEDURE — 85025 COMPLETE CBC W/AUTO DIFF WBC: CPT

## 2024-11-26 PROCEDURE — 1200000000 HC SEMI PRIVATE

## 2024-11-26 PROCEDURE — 2580000003 HC RX 258: Performed by: NURSE PRACTITIONER

## 2024-11-26 PROCEDURE — 85055 RETICULATED PLATELET ASSAY: CPT

## 2024-11-26 PROCEDURE — 2580000003 HC RX 258: Performed by: INTERNAL MEDICINE

## 2024-11-26 PROCEDURE — 6360000002 HC RX W HCPCS: Performed by: INTERNAL MEDICINE

## 2024-11-26 PROCEDURE — 86901 BLOOD TYPING SEROLOGIC RH(D): CPT

## 2024-11-26 PROCEDURE — 86850 RBC ANTIBODY SCREEN: CPT

## 2024-11-26 PROCEDURE — 86900 BLOOD TYPING SEROLOGIC ABO: CPT

## 2024-11-26 PROCEDURE — 80053 COMPREHEN METABOLIC PANEL: CPT

## 2024-11-26 PROCEDURE — 36415 COLL VENOUS BLD VENIPUNCTURE: CPT

## 2024-11-26 PROCEDURE — P9073 PLATELETS PHERESIS PATH REDU: HCPCS

## 2024-11-26 PROCEDURE — 86923 COMPATIBILITY TEST ELECTRIC: CPT

## 2024-11-26 PROCEDURE — 36430 TRANSFUSION BLD/BLD COMPNT: CPT

## 2024-11-26 PROCEDURE — 99233 SBSQ HOSP IP/OBS HIGH 50: CPT | Performed by: INTERNAL MEDICINE

## 2024-11-26 PROCEDURE — 6370000000 HC RX 637 (ALT 250 FOR IP): Performed by: NURSE PRACTITIONER

## 2024-11-26 RX ADMIN — ONDANSETRON 8 MG: 2 INJECTION INTRAMUSCULAR; INTRAVENOUS at 17:33

## 2024-11-26 RX ADMIN — SODIUM CHLORIDE, PRESERVATIVE FREE 10 ML: 5 INJECTION INTRAVENOUS at 20:04

## 2024-11-26 RX ADMIN — ONDANSETRON 8 MG: 2 INJECTION INTRAMUSCULAR; INTRAVENOUS at 03:03

## 2024-11-26 RX ADMIN — CYTARABINE 200 MG: 100 INJECTION, SOLUTION INTRATHECAL; INTRAVENOUS; SUBCUTANEOUS at 20:23

## 2024-11-26 RX ADMIN — SODIUM CHLORIDE, PRESERVATIVE FREE 10 ML: 5 INJECTION INTRAVENOUS at 09:37

## 2024-11-26 RX ADMIN — DEXAMETHASONE SODIUM PHOSPHATE 12 MG: 4 INJECTION, SOLUTION INTRAMUSCULAR; INTRAVENOUS at 19:44

## 2024-11-26 RX ADMIN — METOPROLOL SUCCINATE 25 MG: 25 TABLET, EXTENDED RELEASE ORAL at 09:36

## 2024-11-26 RX ADMIN — ONDANSETRON 8 MG: 2 INJECTION INTRAMUSCULAR; INTRAVENOUS at 09:36

## 2024-11-26 ASSESSMENT — PAIN SCALES - GENERAL: PAINLEVEL_OUTOF10: 0

## 2024-11-26 NOTE — CONSENT
Informed Consent for Blood Component Transfusion Note    I have discussed with the patient the rationale for blood component transfusion; its benefits in treating or preventing fatigue, organ damage, or death; and its risk which includes mild transfusion reactions, rare risk of blood borne infection, or more serious but rare reactions. I have discussed the alternatives to transfusion, including the risk and consequences of not receiving transfusion. The patient had an opportunity to ask questions and had agreed to proceed with transfusion of blood components.    Electronically signed by Hellen Segura MD on 11/26/24 at 11:55 AM EST

## 2024-11-26 NOTE — PROGRESS NOTES
[facility]  Office: [phone]  Carlos Ramírez DO, Jerry Washington DO, Keo Esquivel DO, Matthieu Steven DO, Janet Barr MD, Ivy Rome MD, Kimberlyn Jenkins MD, Deborah Dunbar MD,  Nick Garcia MD, Carlton Roth MD, Hellen Segura MD,  Cherry Dee DO, Carlos Doe MD, Cullen Damon MD, Alban Ramírez DO, Carol Wyatt MD,  Andrew Plata DO, Juanis Lees MD, Hui Laughlin MD, Mira Gee MD, Sonia Escobedo MD,  Gil Wheat MD, hCi Bang MD, Refugio Elliott MD, Livia Hinojosa MD, Sumit Michel MD, Mika Benjamin MD, Presley Hughes DO, Billy Pederson MD, Shirley Waterhouse, CNP,  Corin Kennedy CNP, Presley Billy, JIMENEZ,  Sia Hancock, BESSY, Grace Romo, CNP, Nicole Vargas, CNP, Carolynn Mckinney, CNP, Clau Russell, CNP, Faye Siu PA-C, Elena Rosen PA-C, Madny Juárez, JIMENEZ, Mc Woo, CNP,  Lupe Ramirez, CNP, Valeria Tai, CNP,  Niru Man, CNP, Janessa Carter, CNP         [facility]   IN-PATIENT SERVICE   [facility]    Progress Note    11/26/2024    11:55 AM    Name:   Osbaldo Duff  MRN:     [id number]     Acct:      [id number]   Room:   0437/0437-01   Day:  5  Admit Date:  11/21/2024  2:53 PM    PCP:   No primary care provider on file.  Code Status:  Full Code    Subjective:     C/C:   Chief Complaint   Patient presents with    Abnormal Lab     Interval History Status: not changed.     Patient seen examined at bedside.  No complaints.  Denies any nausea, vomiting, chest pain, diarrhea, constipation.  Discussed about platelets being low.  Is agreeable to being transfused.  Platelets being completed.    Brief History:     69-year-old male diagnosed with acute leukemia, with prior induction therapy currently being admitted for continuation of induction chemotherapy as well as monitoring of blood counts.    Review of Systems:     Constitutional:  negative for chills, fevers, sweats  Respiratory:  negative for

## 2024-11-26 NOTE — PROGRESS NOTES
leukemia (International consensus classification,  2022).  See comment.    Diagnosis Comment  The above diagnosis and following report are rendered following  consultation with Presley Ferro MD, PhD, of [facility] ([id number]).  He also comments,  \"there are numerous proerythroblasts with strong positivity for p53 by  immunohistochemistry.  The aberrant proerythroblasts comprise  approximately 34% of nucleated cells in the hemodilute aspirate smears  and approximately 70% of core biopsy cellularity based on the provided  E-cadherin stain.  There is also dysgranulopoiesis and  dysmegakaryopoiesis.  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  If a TP53 mutation is detected at  >10% variant allele frequency, this leukemia could also be categorized  as \"acute myeloid leukemia with mutated TP53\" based on the  International Consensus Classification.\"  The case was also reviewed  by additional intradepartmental hematopathologists ([facility], [facility]).  The  material submitted for flow cytometric immunophenotypic analysis is  negative for increased blasts and shows no specific immunophenotypic  abnormalities (see flow cytometry addendum).  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  Vitamin B12 and folate levels are within normal limits, as are iron  studies (11/1/2024).  Preliminary findings were discussed with Dr. Jack Gallego via [place] on 11/14/2024 at 17:41 PM and by phone,  and final findings from [facility] were relayed via  [place] on 11/21/2024 at 12:49 PM.  Results of myeloid NGS  testing will follow as an addendum once completed.      EVERT Velarde.  **Electronically  Signed Out**        sf/11/14/2024  Clinical Information  Pre-Op Diagnosis:  THROMBOCYTOPENIA; LOW HEMOGLOBIN  Operation Performed:  BONE MARROW BIOPSY  se     IMAGING DATA:  No orders to display       Primary Problem  Acute erythroid leukemia (HCC)    Active Hospital Problems    Diagnosis Date Noted    Myelosuppression after chemotherapy [D75.89, T45.1X5A] 11/23/2024    Chronic kidney disease, stage II (mild) [N18.2] 11/23/2024    Hyperuricemia [E79.0] 11/22/2024    Acute erythroid leukemia (HCC) [C94.00] 11/22/2024    Admission for chemotherapy [Z51.11] 11/22/2024    Acute leukemia not having achieved remission (HCC) [C95.00] 11/21/2024    HTN (hypertension) [I10] 11/21/2024    Thrombocytopenia (HCC) [D69.6] 11/01/2024    Acute anemia [D64.9] 11/01/2024     IMPRESSION:   Acute erythroid leukemia  Anemia  Thrombocytopenia    RECOMMENDATIONS:  I reviewed the laboratory, imaging studies, prior records, outside records, discussed possible diagnosis and other treatment recommendations   I discussed the bone marrow biopsy results and diagnosis, prognosis and treatment recommendations with patient  I discussed the care plan with his primary oncologist Dr. Gallego over the phone and as per his recommendations patient started on induction chemotherapy with 7+3 regimen on November 22  I reviewed the response and side effects with patient and family  Toxicity profile which include, but are not limited to, fatigue, nausea, vomiting, alopecia, myelosuppression, mucositis, allergic reaction, nephrotoxicity, ototoxicity, neurotoxicity, diarrhea, and constipation has been reviewed with the patient and she is tolerating treatment well  Had echocardiogram baseline EF of 50 - 55%   Check CBC and CMP daily  Monitor uric acid  Transfuse PRBC if hemoglobin less than 7 and transfuse platelets if less than 10K as per leukemia treatment protocol  Continue chemotherapy  Monitor closely  Patient and the family had a number of reasonable

## 2024-11-27 LAB
ALBUMIN SERPL-MCNC: 3.6 G/DL (ref 3.5–5.2)
ALBUMIN/GLOB SERPL: 1.8 {RATIO} (ref 1–2.5)
ALP SERPL-CCNC: 35 U/L (ref 40–129)
ALT SERPL-CCNC: 6 U/L (ref 10–50)
ANION GAP SERPL CALCULATED.3IONS-SCNC: 6 MMOL/L (ref 9–16)
AST SERPL-CCNC: 6 U/L (ref 10–50)
BASOPHILS # BLD: 0 K/UL (ref 0–0.2)
BASOPHILS NFR BLD: 0 % (ref 0–2)
BILIRUB SERPL-MCNC: 1 MG/DL (ref 0–1.2)
BLOOD BANK BLOOD PRODUCT EXPIRATION DATE: NORMAL
BLOOD BANK DISPENSE STATUS: NORMAL
BLOOD BANK ISBT PRODUCT BLOOD TYPE: 7300
BLOOD BANK PRODUCT CODE: NORMAL
BLOOD BANK UNIT TYPE AND RH: NORMAL
BPU ID: NORMAL
BUN SERPL-MCNC: 32 MG/DL (ref 8–23)
CALCIUM SERPL-MCNC: 8.8 MG/DL (ref 8.6–10.4)
CHLORIDE SERPL-SCNC: 107 MMOL/L (ref 98–107)
CO2 SERPL-SCNC: 21 MMOL/L (ref 20–31)
COMPONENT: NORMAL
CREAT SERPL-MCNC: 1.2 MG/DL (ref 0.7–1.2)
EOSINOPHIL # BLD: 0.03 K/UL (ref 0–0.4)
EOSINOPHILS RELATIVE PERCENT: 1 % (ref 1–4)
ERYTHROCYTE [DISTWIDTH] IN BLOOD BY AUTOMATED COUNT: 17.2 % (ref 11.8–14.4)
ERYTHROCYTE [DISTWIDTH] IN BLOOD BY AUTOMATED COUNT: 17.2 % (ref 11.8–14.4)
ERYTHROCYTE [DISTWIDTH] IN BLOOD BY AUTOMATED COUNT: 18.3 % (ref 11.8–14.4)
GFR, ESTIMATED: 66 ML/MIN/1.73M2
GLUCOSE SERPL-MCNC: 150 MG/DL (ref 74–99)
HCT VFR BLD AUTO: 20.9 % (ref 40.7–50.3)
HCT VFR BLD AUTO: 23 % (ref 40.7–50.3)
HCT VFR BLD AUTO: 25.1 % (ref 40.7–50.3)
HGB BLD-MCNC: 6.6 G/DL (ref 13–17)
HGB BLD-MCNC: 7.6 G/DL (ref 13–17)
HGB BLD-MCNC: 8.3 G/DL (ref 13–17)
IMM GRANULOCYTES # BLD AUTO: 0 K/UL (ref 0–0.3)
IMM GRANULOCYTES NFR BLD: 0 %
LYMPHOCYTES NFR BLD: 0.06 K/UL (ref 1–4.8)
LYMPHOCYTES RELATIVE PERCENT: 2 % (ref 24–44)
MCH RBC QN AUTO: 26.9 PG (ref 25.2–33.5)
MCH RBC QN AUTO: 27.6 PG (ref 25.2–33.5)
MCH RBC QN AUTO: 27.8 PG (ref 25.2–33.5)
MCHC RBC AUTO-ENTMCNC: 31.6 G/DL (ref 28.4–34.8)
MCHC RBC AUTO-ENTMCNC: 33 G/DL (ref 28.4–34.8)
MCHC RBC AUTO-ENTMCNC: 33.1 G/DL (ref 28.4–34.8)
MCV RBC AUTO: 83.6 FL (ref 82.6–102.9)
MCV RBC AUTO: 83.9 FL (ref 82.6–102.9)
MCV RBC AUTO: 85.3 FL (ref 82.6–102.9)
MONOCYTES NFR BLD: 0 % (ref 1–7)
MONOCYTES NFR BLD: 0 K/UL (ref 0.1–0.8)
MORPHOLOGY: ABNORMAL
MORPHOLOGY: ABNORMAL
NEUTROPHILS NFR BLD: 97 % (ref 36–66)
NEUTS SEG NFR BLD: 3.11 K/UL (ref 1.8–7.7)
NRBC BLD-RTO: 0 PER 100 WBC
PLATELET # BLD AUTO: ABNORMAL K/UL (ref 138–453)
PLATELET, FLUORESCENCE: 17 K/UL (ref 138–453)
PLATELET, FLUORESCENCE: 4 K/UL (ref 138–453)
PLATELET, FLUORESCENCE: 5 K/UL (ref 138–453)
PLATELETS.RETICULATED NFR BLD AUTO: 10.5 % (ref 1.1–10.3)
PLATELETS.RETICULATED NFR BLD AUTO: 5.3 % (ref 1.1–10.3)
PLATELETS.RETICULATED NFR BLD AUTO: 9.7 % (ref 1.1–10.3)
POTASSIUM SERPL-SCNC: 4.8 MMOL/L (ref 3.7–5.3)
PROT SERPL-MCNC: 5.6 G/DL (ref 6.6–8.7)
RBC # BLD AUTO: 2.45 M/UL (ref 4.21–5.77)
RBC # BLD AUTO: 2.75 M/UL (ref 4.21–5.77)
RBC # BLD AUTO: 2.99 M/UL (ref 4.21–5.77)
SODIUM SERPL-SCNC: 134 MMOL/L (ref 136–145)
TRANSFUSION STATUS: NORMAL
UNIT DIVISION: 0
UNIT ISSUE DATE/TIME: NORMAL
WBC OTHER # BLD: 2.8 K/UL (ref 3.5–11.3)
WBC OTHER # BLD: 3.2 K/UL (ref 3.5–11.3)
WBC OTHER # BLD: 3.9 K/UL (ref 3.5–11.3)

## 2024-11-27 PROCEDURE — 80053 COMPREHEN METABOLIC PANEL: CPT

## 2024-11-27 PROCEDURE — 85055 RETICULATED PLATELET ASSAY: CPT

## 2024-11-27 PROCEDURE — 99232 SBSQ HOSP IP/OBS MODERATE 35: CPT | Performed by: INTERNAL MEDICINE

## 2024-11-27 PROCEDURE — 36430 TRANSFUSION BLD/BLD COMPNT: CPT

## 2024-11-27 PROCEDURE — 6360000002 HC RX W HCPCS: Performed by: INTERNAL MEDICINE

## 2024-11-27 PROCEDURE — 99232 SBSQ HOSP IP/OBS MODERATE 35: CPT | Performed by: STUDENT IN AN ORGANIZED HEALTH CARE EDUCATION/TRAINING PROGRAM

## 2024-11-27 PROCEDURE — 1200000000 HC SEMI PRIVATE

## 2024-11-27 PROCEDURE — 85027 COMPLETE CBC AUTOMATED: CPT

## 2024-11-27 PROCEDURE — 2580000003 HC RX 258: Performed by: INTERNAL MEDICINE

## 2024-11-27 PROCEDURE — 6370000000 HC RX 637 (ALT 250 FOR IP): Performed by: NURSE PRACTITIONER

## 2024-11-27 PROCEDURE — P9016 RBC LEUKOCYTES REDUCED: HCPCS

## 2024-11-27 PROCEDURE — 85025 COMPLETE CBC W/AUTO DIFF WBC: CPT

## 2024-11-27 PROCEDURE — 36415 COLL VENOUS BLD VENIPUNCTURE: CPT

## 2024-11-27 PROCEDURE — 2580000003 HC RX 258: Performed by: NURSE PRACTITIONER

## 2024-11-27 PROCEDURE — P9073 PLATELETS PHERESIS PATH REDU: HCPCS

## 2024-11-27 RX ORDER — SODIUM CHLORIDE 9 MG/ML
INJECTION, SOLUTION INTRAVENOUS PRN
Status: DISCONTINUED | OUTPATIENT
Start: 2024-11-27 | End: 2024-12-01

## 2024-11-27 RX ADMIN — SODIUM CHLORIDE, PRESERVATIVE FREE 10 ML: 5 INJECTION INTRAVENOUS at 21:01

## 2024-11-27 RX ADMIN — DEXAMETHASONE SODIUM PHOSPHATE 12 MG: 4 INJECTION, SOLUTION INTRAMUSCULAR; INTRAVENOUS at 20:20

## 2024-11-27 RX ADMIN — METOPROLOL SUCCINATE 25 MG: 25 TABLET, EXTENDED RELEASE ORAL at 09:54

## 2024-11-27 RX ADMIN — SODIUM CHLORIDE, PRESERVATIVE FREE 10 ML: 5 INJECTION INTRAVENOUS at 20:18

## 2024-11-27 RX ADMIN — ONDANSETRON 8 MG: 2 INJECTION INTRAMUSCULAR; INTRAVENOUS at 03:11

## 2024-11-27 RX ADMIN — ONDANSETRON 8 MG: 2 INJECTION INTRAMUSCULAR; INTRAVENOUS at 18:36

## 2024-11-27 RX ADMIN — ONDANSETRON 8 MG: 2 INJECTION INTRAMUSCULAR; INTRAVENOUS at 09:54

## 2024-11-27 RX ADMIN — CYTARABINE 200 MG: 100 INJECTION, SOLUTION INTRATHECAL; INTRAVENOUS; SUBCUTANEOUS at 20:57

## 2024-11-27 NOTE — PLAN OF CARE
Problem: Infection - Adult  Goal: Absence of infection at discharge  Outcome: Progressing     Problem: Hematologic - Adult  Goal: Maintains hematologic stability  Outcome: Progressing     Problem: Safety - Adult  Goal: Free from fall injury  Outcome: Progressing  Flowsheets (Taken 11/27/2024 0812)  Free From Fall Injury: Instruct family/caregiver on patient safety     Problem: Nutrition Deficit:  Goal: Optimize nutritional status  Outcome: Progressing

## 2024-11-27 NOTE — PROGRESS NOTES
11/14/2024 at 17:41 PM and by phone,  and final findings from [facility] were relayed via  [place] on 11/21/2024 at 12:49 PM.  Results of myeloid NGS  testing will follow as an addendum once completed.      AYAAN Velarde  **Electronically Signed Out**        sf/11/14/2024  Clinical Information  Pre-Op Diagnosis:  THROMBOCYTOPENIA; LOW HEMOGLOBIN  Operation Performed:  BONE MARROW BIOPSY  se     IMAGING DATA:  No orders to display       Primary Problem  Acute erythroid leukemia (HCC)    Active Hospital Problems    Diagnosis Date Noted    Myelosuppression after chemotherapy [D75.89, T45.1X5A] 11/23/2024    Chronic kidney disease, stage II (mild) [N18.2] 11/23/2024    Hyperuricemia [E79.0] 11/22/2024    Acute erythroid leukemia (HCC) [C94.00] 11/22/2024    Admission for chemotherapy [Z51.11] 11/22/2024    Acute leukemia not having achieved remission (HCC) [C95.00] 11/21/2024    HTN (hypertension) [I10] 11/21/2024    Thrombocytopenia (HCC) [D69.6] 11/01/2024    Acute anemia [D64.9] 11/01/2024     IMPRESSION:   Acute erythroid leukemia  Anemia  Thrombocytopenia    RECOMMENDATIONS:  I reviewed the laboratory, imaging studies, prior records, outside records, discussed possible diagnosis and other treatment recommendations   I discussed the bone marrow biopsy results and diagnosis, prognosis and treatment recommendations with patient  I discussed the care plan with his primary oncologist Dr. Gallego over the phone and as per his recommendations patient started on induction chemotherapy with 7+3 regimen on November 22  I reviewed the response and side effects with patient and family  Toxicity profile which include, but are not limited to, fatigue, nausea, vomiting, alopecia, myelosuppression, mucositis, allergic reaction, nephrotoxicity, ototoxicity, neurotoxicity, diarrhea, and constipation has been reviewed with the patient and she is tolerating treatment well  Had echocardiogram baseline EF of 50 - 55%

## 2024-11-27 NOTE — PROGRESS NOTES
Comprehensive Nutrition Assessment    Type and Reason for Visit:  Initial, LOS    Nutrition Recommendations/Plan:   Recommend sending diabetic ONS (Glucerna) BID to help maintain weight.  Obtain updated weight as able for accuracy / monitoring. Order placed.     Malnutrition Assessment:  Malnutrition Status:  At risk for malnutrition (11/27/24 1244)    Context:  Acute Illness (?)     Findings of the 6 clinical characteristics of malnutrition:  Energy Intake:  Mild decrease in energy intake (pt reports minimal during admission)  Weight Loss:  Greater than 5% over 1 month     Body Fat Loss:  Unable to assess     Muscle Mass Loss:  Unable to assess    Fluid Accumulation:  Unable to assess     Strength:  Not Performed    Nutrition Assessment:    Admitted for abnormal lab, recent dx of acute leukemia. Pt reports good appetite and PO intake. States he is eating majority of his meals. States intake is slightly decreased d/t lack of physical activity while in hospital. Pt/wife have no nutrition-related concerns at this time. Noted wt of ~177.5 lbs on 11/20 - wt method unspecified. Last weight obtained on 11/21, bed scale wt of 170 lbs. Recommend obtaining updated weight.    Nutrition Related Findings:    meds/labs reviewed Wound Type: None       Current Nutrition Intake & Therapies:    Average Meal Intake: 51-75%, % (per pt)  Average Supplements Intake: None Ordered  ADULT DIET; Regular    Anthropometric Measures:  Height: 177.8 cm (5' 10\")  Ideal Body Weight (IBW): 166 lbs (75 kg)    Admission Body Weight: 80.5 kg (177 lb 7.5 oz)  Current Body Weight: 77.1 kg (169 lb 15.6 oz) (11/21/24), 102.4 % IBW.    Current BMI (kg/m2): 24.4  Usual Body Weight: 81.9 kg (180 lb 8.9 oz) (11/1/24)     % Weight Change (Calculated): -5.9                    BMI Categories: Normal Weight (BMI 18.5-24.9)    Estimated Daily Nutrient Needs:  Energy Requirements Based On: Kcal/kg  Weight Used for Energy Requirements: Current  Energy  (kcal/day): 5180-6386 kcals/day  Weight Used for Protein Requirements: Current  Protein (g/day): 85-95 gm/day  Method Used for Fluid Requirements: ml/Kg  Fluid (ml/day): 25 mL/kg/d = ~1900 mL/day or per MD    Nutrition Diagnosis:   Unintended weight loss (possible) related to  (?catabolic illness) as evidenced by  (~6% loss x 1 month)    Nutrition Interventions:   Food and/or Nutrient Delivery: Continue Current Diet, Start Oral Nutrition Supplement  Nutrition Education/Counseling: No recommendation at this time  Coordination of Nutrition Care: Continue to monitor while inpatient  Plan of Care discussed with: Pt    Goals:  Goals: Meet at least 75% of estimated needs, by next RD assessment  Type of Goal: New goal  Previous Goal Met: New Goal    Nutrition Monitoring and Evaluation:   Behavioral-Environmental Outcomes: None Identified  Food/Nutrient Intake Outcomes: Food and Nutrient Intake, Supplement Intake  Physical Signs/Symptoms Outcomes: Weight, Biochemical Data, Nutrition Focused Physical Findings, Meal Time Behavior    Discharge Planning:    Too soon to determine     Noemi Mclean, MS, RD, LD  Contact: [phone]

## 2024-11-27 NOTE — PROGRESS NOTES
[facility]  Office: [phone]  Carlos Ramírez DO, Jerry Washington DO, Keo Esquivel DO, Matthieu Steven DO, Janet Barr MD, Ivy Rome MD, Kimberlyn Jenkins MD, Deborah Dunbar MD,  Nick Garcia MD, Carlton Roth MD, Hellen Segura MD,  Cherry Dee DO, Carlos Doe MD, Cullen Damon MD, Alban Ramírez DO, Carol Wyatt MD,  Andrew Plata DO, Juanis Lees MD, Hui Laughlin MD, Mira Gee MD, Sonia Escobedo MD,  Gil Wheat MD, Chi Bang MD, Refugio Elliott MD, Livia Hinojosa MD, Sumit Michel MD, Mika Benjamin MD, Presley Hughes DO, Billy Pederson MD, Shirley Waterhouse, CNP,  Corin Kennedy CNP, Presley Billy, JIMENEZ,  Sia Hancock, BESSY, Grace Romo, CNP, Nicole Vargas, CNP, Carolynn Mckinney, CNP, Clau Russell, CNP, Faye Siu PA-C, Elena Rosen PA-C, Mandy Juárez, CNP, Mc Woo, CNP,  Lupe Ramirez, CNP, Valeria Tai, CNP,  Niru Man, CNP, Janessa Carter, CNP         [facility]   IN-PATIENT SERVICE   [facility]    Progress Note    11/27/2024    11:54 AM    Name:   Osbaldo Duff  MRN:     [id number]     Acct:      [id number]   Room:   [facility]7/0437-01   Day:  6  Admit Date:  11/21/2024  2:53 PM    PCP:   No primary care provider on file.  Code Status:  Full Code    Subjective:     C/C:   Chief Complaint   Patient presents with    Abnormal Lab     Interval History Status: not changed.     Patient seen examined at bedside.  Wife also at bedside.  being transfused.  No complaints. Some issues with sleep but declines any hypnotics.   Brief History:     69-year-old male diagnosed with acute leukemia, with prior induction therapy currently being admitted for continuation of induction chemotherapy as well as monitoring of blood counts.    Review of Systems:     Constitutional:  negative for chills, fevers, sweats  Respiratory:  negative for cough, dyspnea on exertion, shortness of breath, wheezing  Cardiovascular:

## 2024-11-28 LAB
ABO/RH: NORMAL
ALBUMIN SERPL-MCNC: 3.4 G/DL (ref 3.5–5.2)
ALBUMIN/GLOB SERPL: 1.5 {RATIO} (ref 1–2.5)
ALP SERPL-CCNC: 35 U/L (ref 40–129)
ALT SERPL-CCNC: 6 U/L (ref 10–50)
ANION GAP SERPL CALCULATED.3IONS-SCNC: 10 MMOL/L (ref 9–16)
ANTIBODY SCREEN: NEGATIVE
ARM BAND NUMBER: NORMAL
AST SERPL-CCNC: 6 U/L (ref 10–50)
BASOPHILS # BLD: 0 K/UL (ref 0–0.2)
BASOPHILS NFR BLD: 0 % (ref 0–2)
BILIRUB SERPL-MCNC: 1.1 MG/DL (ref 0–1.2)
BLOOD BANK BLOOD PRODUCT EXPIRATION DATE: NORMAL
BLOOD BANK BLOOD PRODUCT EXPIRATION DATE: NORMAL
BLOOD BANK DISPENSE STATUS: NORMAL
BLOOD BANK DISPENSE STATUS: NORMAL
BLOOD BANK ISBT PRODUCT BLOOD TYPE: 5100
BLOOD BANK ISBT PRODUCT BLOOD TYPE: 5100
BLOOD BANK PRODUCT CODE: NORMAL
BLOOD BANK PRODUCT CODE: NORMAL
BLOOD BANK SAMPLE EXPIRATION: NORMAL
BLOOD BANK UNIT TYPE AND RH: NORMAL
BLOOD BANK UNIT TYPE AND RH: NORMAL
BPU ID: NORMAL
BPU ID: NORMAL
BUN SERPL-MCNC: 31 MG/DL (ref 8–23)
CALCIUM SERPL-MCNC: 8.5 MG/DL (ref 8.6–10.4)
CHLORIDE SERPL-SCNC: 106 MMOL/L (ref 98–107)
CO2 SERPL-SCNC: 20 MMOL/L (ref 20–31)
COMPONENT: NORMAL
COMPONENT: NORMAL
CREAT SERPL-MCNC: 1.1 MG/DL (ref 0.7–1.2)
CROSSMATCH RESULT: NORMAL
EOSINOPHIL # BLD: 0 K/UL (ref 0–0.4)
EOSINOPHILS RELATIVE PERCENT: 0 % (ref 1–4)
ERYTHROCYTE [DISTWIDTH] IN BLOOD BY AUTOMATED COUNT: 17.1 % (ref 11.8–14.4)
GFR, ESTIMATED: 73 ML/MIN/1.73M2
GLUCOSE SERPL-MCNC: 147 MG/DL (ref 74–99)
HCT VFR BLD AUTO: 24.5 % (ref 40.7–50.3)
HGB BLD-MCNC: 7.9 G/DL (ref 13–17)
IMM GRANULOCYTES # BLD AUTO: 0 K/UL (ref 0–0.3)
IMM GRANULOCYTES NFR BLD: 0 %
LYMPHOCYTES NFR BLD: 0.13 K/UL (ref 1–4.8)
LYMPHOCYTES RELATIVE PERCENT: 11 % (ref 24–44)
MCH RBC QN AUTO: 27.6 PG (ref 25.2–33.5)
MCHC RBC AUTO-ENTMCNC: 32.2 G/DL (ref 28.4–34.8)
MCV RBC AUTO: 85.7 FL (ref 82.6–102.9)
MONOCYTES NFR BLD: 0 % (ref 1–7)
MONOCYTES NFR BLD: 0 K/UL (ref 0.1–0.8)
MORPHOLOGY: ABNORMAL
MORPHOLOGY: ABNORMAL
NEUTROPHILS NFR BLD: 89 % (ref 36–66)
NEUTS SEG NFR BLD: 1.07 K/UL (ref 1.8–7.7)
NRBC BLD-RTO: 0 PER 100 WBC
PLATELET # BLD AUTO: ABNORMAL K/UL (ref 138–453)
PLATELET, FLUORESCENCE: 10 K/UL (ref 138–453)
PLATELETS.RETICULATED NFR BLD AUTO: 7.9 % (ref 1.1–10.3)
POTASSIUM SERPL-SCNC: 4.9 MMOL/L (ref 3.7–5.3)
PROT SERPL-MCNC: 5.7 G/DL (ref 6.6–8.7)
RBC # BLD AUTO: 2.86 M/UL (ref 4.21–5.77)
SODIUM SERPL-SCNC: 136 MMOL/L (ref 136–145)
TRANSFUSION STATUS: NORMAL
TRANSFUSION STATUS: NORMAL
UNIT DIVISION: 0
UNIT DIVISION: 0
UNIT ISSUE DATE/TIME: NORMAL
UNIT ISSUE DATE/TIME: NORMAL
WBC OTHER # BLD: 1.2 K/UL (ref 3.5–11.3)

## 2024-11-28 PROCEDURE — 2580000003 HC RX 258: Performed by: INTERNAL MEDICINE

## 2024-11-28 PROCEDURE — 6360000002 HC RX W HCPCS: Performed by: INTERNAL MEDICINE

## 2024-11-28 PROCEDURE — 85055 RETICULATED PLATELET ASSAY: CPT

## 2024-11-28 PROCEDURE — 1200000000 HC SEMI PRIVATE

## 2024-11-28 PROCEDURE — 85025 COMPLETE CBC W/AUTO DIFF WBC: CPT

## 2024-11-28 PROCEDURE — 99232 SBSQ HOSP IP/OBS MODERATE 35: CPT | Performed by: STUDENT IN AN ORGANIZED HEALTH CARE EDUCATION/TRAINING PROGRAM

## 2024-11-28 PROCEDURE — 2580000003 HC RX 258: Performed by: NURSE PRACTITIONER

## 2024-11-28 PROCEDURE — 6370000000 HC RX 637 (ALT 250 FOR IP): Performed by: NURSE PRACTITIONER

## 2024-11-28 PROCEDURE — 36415 COLL VENOUS BLD VENIPUNCTURE: CPT

## 2024-11-28 PROCEDURE — 80053 COMPREHEN METABOLIC PANEL: CPT

## 2024-11-28 PROCEDURE — 99232 SBSQ HOSP IP/OBS MODERATE 35: CPT | Performed by: INTERNAL MEDICINE

## 2024-11-28 RX ADMIN — SODIUM CHLORIDE, PRESERVATIVE FREE 10 ML: 5 INJECTION INTRAVENOUS at 18:10

## 2024-11-28 RX ADMIN — SODIUM CHLORIDE, PRESERVATIVE FREE 10 ML: 5 INJECTION INTRAVENOUS at 09:05

## 2024-11-28 RX ADMIN — SODIUM CHLORIDE, PRESERVATIVE FREE 10 ML: 5 INJECTION INTRAVENOUS at 10:31

## 2024-11-28 RX ADMIN — METOPROLOL SUCCINATE 25 MG: 25 TABLET, EXTENDED RELEASE ORAL at 09:04

## 2024-11-28 RX ADMIN — SODIUM CHLORIDE, PRESERVATIVE FREE 10 ML: 5 INJECTION INTRAVENOUS at 21:26

## 2024-11-28 RX ADMIN — ONDANSETRON 8 MG: 2 INJECTION INTRAMUSCULAR; INTRAVENOUS at 03:02

## 2024-11-28 RX ADMIN — ONDANSETRON 8 MG: 2 INJECTION INTRAMUSCULAR; INTRAVENOUS at 18:09

## 2024-11-28 RX ADMIN — DEXAMETHASONE SODIUM PHOSPHATE 12 MG: 4 INJECTION, SOLUTION INTRAMUSCULAR; INTRAVENOUS at 20:17

## 2024-11-28 RX ADMIN — ONDANSETRON 8 MG: 2 INJECTION INTRAMUSCULAR; INTRAVENOUS at 10:31

## 2024-11-28 RX ADMIN — CYTARABINE 200 MG: 100 INJECTION, SOLUTION INTRATHECAL; INTRAVENOUS; SUBCUTANEOUS at 21:21

## 2024-11-28 NOTE — PLAN OF CARE
Problem: Infection - Adult  Goal: Absence of infection at discharge  11/28/2024 0308 by Yamini Perkins RN  Outcome: Progressing     Problem: Hematologic - Adult  Goal: Maintains hematologic stability  11/28/2024 0308 by Yamini Perkins RN  Outcome: Progressing     Problem: Safety - Adult  Goal: Free from fall injury  11/28/2024 0308 by Yamini Perkins RN  Outcome: Progressing     Problem: Nutrition Deficit:  Goal: Optimize nutritional status  11/28/2024 0308 by Yamini Perkins RN  Outcome: Progressing

## 2024-11-28 NOTE — PROGRESS NOTES
[facility]  Office: [phone]  Carlos Ramírez DO, Jerry Washington DO, Keo Esquivel DO, Matthieu Steven DO, Janet Barr MD, Ivy Rome MD, Kimberlyn Jenkins MD, Deborah Dunbar MD,  Nick Garcia MD, Carlton Roth MD, Hellen Segura MD,  Cherry Dee DO, Carlos Doe MD, Cullen Damon MD, Alban Ramírez DO, Carol Wyatt MD,  Andrew Plata DO, Juanis Lees MD, Hui Laughlin MD, Mira Gee MD, Sonia Escobedo MD,  Gil Wheat MD, Chi Bang MD, Refugio Elliott MD, Livia Hinojosa MD, Sumit Michel MD, Mika Benjamin MD, Presley Hughes DO, Billy Pederson MD, Shirley Waterhouse, CNP,  Corin Kennedy CNP, Presley Billy, JIMENEZ,  Sia Hancock, BESSY, Grace Romo, CNP, Nicole Vargas, CNP, Carolynn Mckinney, CNP, Clau Russell, CNP, Faye Siu PA-C, GÉNESIS UpC, Mandy Juárez, CNP, Mc Woo, CNP,  Lupe Ramirez, CNP, Valeria Tai, CNP,  Niru Man, CNP, Janessa Carter, CNP         [facility]   IN-PATIENT SERVICE   [facility]    Progress Note    11/28/2024    11:58 AM    Name:   Osbaldo Duff  MRN:     [id number]     Acct:      [id number]   Room:   [facility]7/0437-01   Day:  7  Admit Date:  11/21/2024  2:53 PM    PCP:   No primary care provider on file.  Code Status:  Full Code    Subjective:     C/C:   Chief Complaint   Patient presents with    Abnormal Lab     Interval History Status: not changed.     Patient seen examined at bedside.  Wife also at bedside.  Hemoglobin and platelets stable today.  Currently getting cytarabine infusion.  Denies any complaints.  Had some issues with sleeping off and on however does not want any sleeping aid.    Brief History:     69-year-old male diagnosed with acute leukemia, with prior induction therapy currently being admitted for continuation of induction chemotherapy as well as monitoring of blood counts.    Review of Systems:     Constitutional:  negative for chills, fevers,  5.6 oz)   SpO2 100%   BMI 25.59 kg/m²   Temp (24hrs), Av °F (36.7 °C), Min:97.7 °F (36.5 °C), Max:98.6 °F (37 °C)    No results for input(s): \"POCGLU\" in the last 72 hours.    I/O (24Hr):    Intake/Output Summary (Last 24 hours) at 2024 1158  Last data filed at 2024 0645  Gross per 24 hour   Intake 355 ml   Output --   Net 355 ml       Labs:  Hematology:  Recent Labs     24  0911 24  1451 24  0609   WBC 3.9 2.8* 1.2*   RBC 2.99* 2.75* 2.86*   HGB 8.3* 7.6* 7.9*   HCT 25.1* 23.0* 24.5*   MCV 83.9 83.6 85.7   MCH 27.8 27.6 27.6   MCHC 33.1 33.0 32.2   RDW 17.2* 17.2* 17.1*   PLT See Reflexed IPF Result See Reflexed IPF Result See Reflexed IPF Result     Chemistry:  Recent Labs     24  0650 24  0430 24  0609    134* 136   K 4.7 4.8 4.9   * 107 106   CO2 20 21 20   GLUCOSE 136* 150* 147*   BUN 34* 32* 31*   CREATININE 1.2 1.2 1.1   ANIONGAP 10 6* 10   LABGLOM 66 66 73   CALCIUM 9.0 8.8 8.5*     Recent Labs     24  0650 24  0430 24  0609   AST 7* 6* 6*   ALT 5* 6* 6*   ALKPHOS 40 35* 35*   BILITOT 0.9 1.0 1.1     ABG:No results found for: \"POCPH\", \"PHART\", \"PH\", \"POCPCO2\", \"FQA9REU\", \"PCO2\", \"POCPO2\", \"PO2ART\", \"PO2\", \"POCHCO3\", \"MAZ6UBW\", \"HCO3\", \"NBEA\", \"PBEA\", \"BEART\", \"BE\", \"THGBART\", \"THB\", \"HBG1KIJ\", \"WOGS5LKB\", \"J7FPBYAU\", \"O2SAT\", \"FIO2\"  No results found for: \"SPECIAL\"  No results found for: \"CULTURE\"    Radiology:  No results found.    Physical Examination:        General appearance:  alert, cooperative and no distress  Mental Status:  oriented to person, place and time and normal affect  Lungs:  clear to auscultation bilaterally, normal effort  Heart:  regular rate and rhythm, no murmur  Abdomen:  soft, nontender, nondistended, normal bowel sounds, no masses, hepatomegaly, splenomegaly  Extremities:  no edema, redness, tenderness in the calves  Skin:  no gross lesions, rashes, induration    Assessment:        Hospital Problems

## 2024-11-28 NOTE — PLAN OF CARE
Problem: Infection - Adult  Goal: Absence of infection at discharge  Outcome: Progressing     Problem: Hematologic - Adult  Goal: Maintains hematologic stability  Outcome: Progressing     Problem: Safety - Adult  Goal: Free from fall injury  Outcome: Progressing     Problem: Nutrition Deficit:  Goal: Optimize nutritional status  Outcome: Progressing

## 2024-11-28 NOTE — PROGRESS NOTES
Today's Date: 11/28/2024  Patient Name: Osbaldo Duff  Date of admission: 11/21/2024  2:53 PM  Patient's age: 69 y.o., 1955  Admission Dx: Acute leukemia, in relapse (HCC) [C95.02]  Acute leukemia not having achieved remission (HCC) [C95.00]    Reason for Consult: acute lukemia, sent from office   Requesting Physician: No admitting provider for patient encounter.    CHIEF COMPLAINT:    Chief Complaint   Patient presents with    Abnormal Lab       History Obtained From:  patient and chart  INTERVAL HISTORY:    Day 6 chemotherapy  Patient seen and examined  NO fever chills  ECHO noted  Platelet 10  Hemoglobin 7.9  WBC at 1.2    HISTORY OF PRESENT ILLNESS:    Osbaldo Duff is a 69 y.o. male who is admitted to the hospital on 11/21/2024  for anemia, low hb and acute leukemia diagnosis    Patient was following with Dr. Gallego as outpatient for possible suspected bone marrow pathology including MDS/leukemia.    Bone marrow biopsy was performed on 11/11/2024 and was unclear diagnosis here therefore was sent to [facility] which confirmed the diagnosis of acute erythroid leukemia.    Patient has pancytopenia.  Patient is now sent to ER for further management and likely administration of induction chemotherapy.    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  History was obtained through an .  Patient understand very limited English.  Patient has not seen a doctor for a while.  Was seen by primary care provider due to complaints of fatigue also having low-grade fever.  Lab workup showed significantly abnormalities including severe  treatment well  Had echocardiogram baseline EF of 50 - 55%   Check CBC and CMP daily  Monitor uric acid  Transfuse PRBC if hemoglobin less than 7 and transfuse platelets if less than 10K as per leukemia treatment protocol  Continue chemotherapy  Monitor closely  Patient and the family had a number of reasonable questions which were answered to their satisfaction.  They verbalized understanding of the information provided and they agreed to proceed as outlined above.       Discussed with patient and family and Nurse.                                  Lynette Oliver MD                          [facility]                            This note is created with the assistance of a speech recognition program.  While intending to generate a document that actually reflects the content of the visit, the document can still have some errors including those of syntax and sound a like substitutions which may escape proof reading.  It such instances, actual meaning can be extrapolated by contextual diversion.

## 2024-11-29 LAB
ALBUMIN SERPL-MCNC: 3.2 G/DL (ref 3.5–5.2)
ALBUMIN/GLOB SERPL: 1.5 {RATIO} (ref 1–2.5)
ALP SERPL-CCNC: 32 U/L (ref 40–129)
ALT SERPL-CCNC: 5 U/L (ref 10–50)
ANION GAP SERPL CALCULATED.3IONS-SCNC: 9 MMOL/L (ref 9–16)
AST SERPL-CCNC: 6 U/L (ref 10–50)
BILIRUB SERPL-MCNC: 0.8 MG/DL (ref 0–1.2)
BUN SERPL-MCNC: 29 MG/DL (ref 8–23)
CALCIUM SERPL-MCNC: 8.3 MG/DL (ref 8.6–10.4)
CHLORIDE SERPL-SCNC: 109 MMOL/L (ref 98–107)
CO2 SERPL-SCNC: 19 MMOL/L (ref 20–31)
CREAT SERPL-MCNC: 1 MG/DL (ref 0.7–1.2)
ERYTHROCYTE [DISTWIDTH] IN BLOOD BY AUTOMATED COUNT: 16.6 % (ref 11.8–14.4)
GFR, ESTIMATED: 82 ML/MIN/1.73M2
GLUCOSE SERPL-MCNC: 138 MG/DL (ref 74–99)
HCT VFR BLD AUTO: 23.4 % (ref 40.7–50.3)
HGB BLD-MCNC: 7 G/DL (ref 13–17)
MCH RBC QN AUTO: 27.3 PG (ref 25.2–33.5)
MCHC RBC AUTO-ENTMCNC: 29.9 G/DL (ref 28.4–34.8)
MCV RBC AUTO: 91.4 FL (ref 82.6–102.9)
NRBC BLD-RTO: 0 PER 100 WBC
PLATELET # BLD AUTO: ABNORMAL K/UL (ref 138–453)
PLATELET # BLD AUTO: NORMAL K/UL (ref 138–453)
PLATELET, FLUORESCENCE: 13 K/UL (ref 138–453)
PLATELET, FLUORESCENCE: 6 K/UL (ref 138–453)
PLATELETS.RETICULATED NFR BLD AUTO: 2.1 % (ref 1.1–10.3)
PLATELETS.RETICULATED NFR BLD AUTO: 5.8 % (ref 1.1–10.3)
POTASSIUM SERPL-SCNC: 5 MMOL/L (ref 3.7–5.3)
PROT SERPL-MCNC: 5.4 G/DL (ref 6.6–8.7)
RBC # BLD AUTO: 2.56 M/UL (ref 4.21–5.77)
SODIUM SERPL-SCNC: 137 MMOL/L (ref 136–145)
WBC OTHER # BLD: 0.5 K/UL (ref 3.5–11.3)

## 2024-11-29 PROCEDURE — 36430 TRANSFUSION BLD/BLD COMPNT: CPT

## 2024-11-29 PROCEDURE — 1200000000 HC SEMI PRIVATE

## 2024-11-29 PROCEDURE — 6360000002 HC RX W HCPCS: Performed by: INTERNAL MEDICINE

## 2024-11-29 PROCEDURE — 99232 SBSQ HOSP IP/OBS MODERATE 35: CPT | Performed by: STUDENT IN AN ORGANIZED HEALTH CARE EDUCATION/TRAINING PROGRAM

## 2024-11-29 PROCEDURE — 99232 SBSQ HOSP IP/OBS MODERATE 35: CPT | Performed by: INTERNAL MEDICINE

## 2024-11-29 PROCEDURE — 36415 COLL VENOUS BLD VENIPUNCTURE: CPT

## 2024-11-29 PROCEDURE — 85027 COMPLETE CBC AUTOMATED: CPT

## 2024-11-29 PROCEDURE — 85025 COMPLETE CBC W/AUTO DIFF WBC: CPT

## 2024-11-29 PROCEDURE — 6370000000 HC RX 637 (ALT 250 FOR IP): Performed by: NURSE PRACTITIONER

## 2024-11-29 PROCEDURE — 2580000003 HC RX 258: Performed by: NURSE PRACTITIONER

## 2024-11-29 PROCEDURE — 80053 COMPREHEN METABOLIC PANEL: CPT

## 2024-11-29 PROCEDURE — 85055 RETICULATED PLATELET ASSAY: CPT

## 2024-11-29 PROCEDURE — P9073 PLATELETS PHERESIS PATH REDU: HCPCS

## 2024-11-29 PROCEDURE — 85049 AUTOMATED PLATELET COUNT: CPT

## 2024-11-29 PROCEDURE — 2580000003 HC RX 258: Performed by: INTERNAL MEDICINE

## 2024-11-29 RX ORDER — SODIUM CHLORIDE 9 MG/ML
INJECTION, SOLUTION INTRAVENOUS PRN
Status: DISCONTINUED | OUTPATIENT
Start: 2024-11-29 | End: 2024-12-01

## 2024-11-29 RX ADMIN — METOPROLOL SUCCINATE 25 MG: 25 TABLET, EXTENDED RELEASE ORAL at 08:24

## 2024-11-29 RX ADMIN — ONDANSETRON 8 MG: 2 INJECTION INTRAMUSCULAR; INTRAVENOUS at 03:06

## 2024-11-29 RX ADMIN — SODIUM CHLORIDE, PRESERVATIVE FREE 10 ML: 5 INJECTION INTRAVENOUS at 21:43

## 2024-11-29 RX ADMIN — SODIUM CHLORIDE, PRESERVATIVE FREE 10 ML: 5 INJECTION INTRAVENOUS at 08:24

## 2024-11-29 NOTE — PLAN OF CARE
Problem: Infection - Adult  Goal: Absence of infection at discharge  11/29/2024 0010 by Joel Kelly RN  Outcome: Progressing  11/28/2024 1834 by Mckayla Hatfield RN  Outcome: Progressing     Problem: Hematologic - Adult  Goal: Maintains hematologic stability  11/29/2024 0010 by Joel Kelly RN  Outcome: Progressing  11/28/2024 1834 by Mckayla Hatfield RN  Outcome: Progressing     Problem: Safety - Adult  Goal: Free from fall injury  11/29/2024 0010 by Joel Kelly RN  Outcome: Progressing  11/28/2024 1834 by Mckayla Hatfield RN  Outcome: Progressing     Problem: Nutrition Deficit:  Goal: Optimize nutritional status  11/29/2024 0010 by Joel Kelly RN  Outcome: Progressing  11/28/2024 1834 by Mckayla Hatfield RN  Outcome: Progressing

## 2024-11-29 NOTE — PROGRESS NOTES
[facility]  Office: [phone]  Carlos Ramírez DO, Jerry Washington DO, Keo Esquivel DO, Matthieu Steven DO, Janet Barr MD, Ivy Rome MD, Kimberlyn Jenkins MD, Deborah Dunbar MD,  Nick Garcia MD, Carlton Roth MD, Hellen Segura MD,  Cherry Dee DO, Carlos Doe MD, Cullen Damon MD, Alban Ramírez DO, Carol Wyatt MD,  Andrew Plata DO, Juanis Lees MD, Hui Laughlin MD, Mira Gee MD, Sonia Escobedo MD,  Gil Wheat MD, Chi Bang MD, Refugio Elliott MD, Livia Hinojosa MD, Sumit Michel MD, Mika Benjamin MD, Presley Hughes DO, Billy Pederson MD, Shirley Waterhouse, CNP,  Corin Kennedy CNP, Presley Billy, JIMENEZ,  Sia Hancock, BESSY, Grace Romo, CNP, Nicole Vargas, CNP, Carolynn Mckinney, CNP, Clau Russell, CNP, Faye Siu PA-C, Elena Rosen PA-C, Mandy Juárez, JIMENEZ, Mc Woo, CNP,  Lupe Ramirez, CNP, Valeria Tai, CNP,  Niru Man, CNP, Janessa Carter, CNP         [facility]   IN-PATIENT SERVICE   [facility]    Progress Note    11/29/2024    11:22 AM    Name:   Osbaldo Duff  MRN:     [id number]     Acct:      [id number]   Room:   0437/0437-01   Day:  8  Admit Date:  11/21/2024  2:53 PM    PCP:   No primary care provider on file.  Code Status:  Full Code    Subjective:     C/C:   Chief Complaint   Patient presents with    Abnormal Lab     Interval History Status: not changed.     Patient seen examined at bedside.  Wife also at bedside. Getting platelet transfusion about long term completed. No adverse reactions.     Brief History:     69-year-old male diagnosed with acute leukemia, with prior induction therapy currently being admitted for continuation of induction chemotherapy as well as monitoring of blood counts.    Review of Systems:     Constitutional:  negative for chills, fevers, sweats  Respiratory:  negative for cough, dyspnea on exertion, shortness of breath, wheezing  Cardiovascular:   negative for chest pain, chest pressure/discomfort, lower extremity edema, palpitations  Gastrointestinal:  negative for abdominal pain, constipation, diarrhea, nausea, vomiting  Neurological:  negative for dizziness, headache    Medications:     Allergies:  No Known Allergies    Current Meds:   Scheduled Meds:    ondansetron  8 mg IntraVENous q8h    cytarabine (PF) (CYTARABINE) 200 mg in sodium chloride 0.9 % 1,000 mL chemo infusion  200 mg IntraVENous Q24H    sodium chloride flush  5-40 mL IntraVENous 2 times per day    metoprolol succinate  25 mg Oral Daily    sodium chloride flush  5-40 mL IntraVENous 2 times per day    lidocaine 1 % injection  50 mg IntraDERmal Once     Continuous Infusions:    sodium chloride      sodium chloride      sodium chloride      sodium chloride      sodium chloride      sodium chloride      sodium chloride      sodium chloride       PRN Meds: sodium chloride, sodium chloride, sodium chloride, sodium chloride, sodium chloride, sodium chloride, LORazepam, sodium chloride flush, sodium chloride, potassium chloride **OR** potassium alternative oral replacement **OR** potassium chloride, ondansetron **OR** ondansetron, polyethylene glycol, bisacodyl, acetaminophen **OR** acetaminophen, sodium chloride flush, sodium chloride    Data:     Past Medical History:   has a past medical history of Acute erythroid leukemia (HCC), Anemia, Thrombocytopenia (HCC), and Wears dentures.    Social History:   reports that he has never smoked. He has never used smokeless tobacco. He reports that he does not currently use alcohol. He reports that he does not use drugs.     Family History:   Family History   Problem Relation Age of Onset    Stroke Mother     Stroke Father        Vitals:  /63   Pulse 63   Temp 97.6 °F (36.4 °C) (Axillary)   Resp 16   Ht 1.778 m (5' 10\")   Wt 80.9 kg (178 lb 5.6 oz)   SpO2 99%   BMI 25.59 kg/m²   Temp (24hrs), Av.8 °F (36.6 °C), Min:97.5 °F (36.4 °C), Max:98.2

## 2024-11-29 NOTE — PROGRESS NOTES
Dr. Segura at bedside to evalaute patient. Says since hemoglobin is 7 we can hold off on blood until under 7.     Electronically signed by Shannon Webb RN on 11/29/2024 at 10:59 AM

## 2024-11-29 NOTE — PROGRESS NOTES
thrombocytopenia and anemia subsequently patient sent to the ER.     Patient denies any weight loss swollen glands.  Does complain of having viral syndrome like symptoms over the last 1 week.  Denies noticing any bleeding.  Denies cough.  Patient does not smoke.  Drinks alcohol rarely.    Past Medical History:   has a past medical history of Acute erythroid leukemia (HCC), Anemia, Thrombocytopenia (HCC), and Wears dentures.    Past Surgical History:   has a past surgical history that includes Hand surgery; Inguinal hernia repair; and CT BIOPSY BONE MARROW (11/11/2024).     Medications:    Prior to Admission medications    Medication Sig Start Date End Date Taking? Authorizing Provider   metoprolol succinate (TOPROL XL) 25 MG extended release tablet Take 1 tablet by mouth daily 11/1/24  Yes Dexter Russell MD   Multiple Vitamin (MULTIVITAMIN ADULT PO) Take by mouth   Yes ProviderDexter MD     Current Facility-Administered Medications   Medication Dose Route Frequency Provider Last Rate Last Admin    0.9 % sodium chloride infusion   IntraVENous PRN Hellen Segura MD        0.9 % sodium chloride infusion   IntraVENous PRN Lupe Ramirez APRN - CNP        0.9 % sodium chloride infusion   IntraVENous PRN Lupe Ramirez APRN - CNP        0.9 % sodium chloride infusion   IntraVENous PRN Hellen Segura MD        0.9 % sodium chloride infusion   IntraVENous PRN Lynette Oliver MD        0.9 % sodium chloride infusion   IntraVENous PRN Yevgeniy Odell MD        ondansetron (ZOFRAN) injection 8 mg  8 mg IntraVENous q8h Jack Gallego MD   8 mg at 11/29/24 0306    LORazepam (ATIVAN) injection 0.5 mg  0.5 mg IntraVENous Q6H PRN Jack Gallego MD        cytarabine (PF) (CYTARABINE) 200 mg in sodium chloride 0.9 % 1,000 mL chemo infusion  200 mg IntraVENous Q24H Jack Gallego MD 41.8 mL/hr at 11/28/24 2121 200 mg at 11/28/24 2121    sodium chloride flush 0.9 % injection 5-40 mL  5-40 mL    Check CBC and CMP daily  Monitor uric acid  Transfuse PRBC if hemoglobin less than 7 and transfuse platelets if less than 10K as per leukemia treatment protocol  Continue chemotherapy  Monitor closely  Patient and the family had a number of reasonable questions which were answered to their satisfaction.  They verbalized understanding of the information provided and they agreed to proceed as outlined above.       Discussed with patient and family and Nurse.                                  Lynette Oliver MD                          [facility]                            This note is created with the assistance of a speech recognition program.  While intending to generate a document that actually reflects the content of the visit, the document can still have some errors including those of syntax and sound a like substitutions which may escape proof reading.  It such instances, actual meaning can be extrapolated by contextual diversion.

## 2024-11-30 LAB
ALBUMIN SERPL-MCNC: 3 G/DL (ref 3.5–5.2)
ALBUMIN/GLOB SERPL: 1.4 {RATIO} (ref 1–2.5)
ALP SERPL-CCNC: 31 U/L (ref 40–129)
ALT SERPL-CCNC: <5 U/L (ref 10–50)
ANION GAP SERPL CALCULATED.3IONS-SCNC: 7 MMOL/L (ref 9–16)
AST SERPL-CCNC: 6 U/L (ref 10–50)
BASOPHILS # BLD: 0 K/UL (ref 0–0.2)
BASOPHILS # BLD: 0 K/UL (ref 0–0.2)
BASOPHILS NFR BLD: 0 % (ref 0–2)
BASOPHILS NFR BLD: 0 % (ref 0–2)
BILIRUB SERPL-MCNC: 1.2 MG/DL (ref 0–1.2)
BLOOD BANK BLOOD PRODUCT EXPIRATION DATE: NORMAL
BLOOD BANK DISPENSE STATUS: NORMAL
BLOOD BANK ISBT PRODUCT BLOOD TYPE: 5100
BLOOD BANK PRODUCT CODE: NORMAL
BLOOD BANK UNIT TYPE AND RH: NORMAL
BPU ID: NORMAL
BUN SERPL-MCNC: 28 MG/DL (ref 8–23)
CALCIUM SERPL-MCNC: 8.3 MG/DL (ref 8.6–10.4)
CELLS COUNTED: 50
CHLORIDE SERPL-SCNC: 107 MMOL/L (ref 98–107)
CO2 SERPL-SCNC: 22 MMOL/L (ref 20–31)
COMPONENT: NORMAL
CREAT SERPL-MCNC: 1 MG/DL (ref 0.7–1.2)
EOSINOPHIL # BLD: 0 K/UL (ref 0–0.4)
EOSINOPHIL # BLD: 0 K/UL (ref 0–0.4)
EOSINOPHILS RELATIVE PERCENT: 0 % (ref 1–4)
EOSINOPHILS RELATIVE PERCENT: 0 % (ref 1–4)
ERYTHROCYTE [DISTWIDTH] IN BLOOD BY AUTOMATED COUNT: 15.2 % (ref 11.8–14.4)
ERYTHROCYTE [DISTWIDTH] IN BLOOD BY AUTOMATED COUNT: 16.2 % (ref 11.8–14.4)
GFR, ESTIMATED: 82 ML/MIN/1.73M2
GLUCOSE SERPL-MCNC: 92 MG/DL (ref 74–99)
HCT VFR BLD AUTO: 20.4 % (ref 40.7–50.3)
HCT VFR BLD AUTO: 22.3 % (ref 40.7–50.3)
HGB BLD-MCNC: 6.7 G/DL (ref 13–17)
HGB BLD-MCNC: 7.3 G/DL (ref 13–17)
IMM GRANULOCYTES # BLD AUTO: 0 K/UL (ref 0–0.3)
IMM GRANULOCYTES # BLD AUTO: 0 K/UL (ref 0–0.3)
IMM GRANULOCYTES NFR BLD: 0 %
IMM GRANULOCYTES NFR BLD: 0 %
LYMPHOCYTES NFR BLD: 0.45 K/UL (ref 1–4.8)
LYMPHOCYTES NFR BLD: 0.76 K/UL (ref 1–4.8)
LYMPHOCYTES RELATIVE PERCENT: 84 % (ref 24–44)
LYMPHOCYTES RELATIVE PERCENT: 90 % (ref 24–44)
MCH RBC QN AUTO: 28.4 PG (ref 25.2–33.5)
MCH RBC QN AUTO: 28.9 PG (ref 25.2–33.5)
MCHC RBC AUTO-ENTMCNC: 32.7 G/DL (ref 28.4–34.8)
MCHC RBC AUTO-ENTMCNC: 32.8 G/DL (ref 28.4–34.8)
MCV RBC AUTO: 86.4 FL (ref 82.6–102.9)
MCV RBC AUTO: 88.1 FL (ref 82.6–102.9)
MONOCYTES NFR BLD: 0 % (ref 1–7)
MONOCYTES NFR BLD: 0 K/UL (ref 0.1–0.8)
MONOCYTES NFR BLD: 0.01 K/UL (ref 0.1–0.8)
MONOCYTES NFR BLD: 2 % (ref 1–7)
MORPHOLOGY: ABNORMAL
MORPHOLOGY: ABNORMAL
NEUTROPHILS NFR BLD: 16 % (ref 36–66)
NEUTROPHILS NFR BLD: 8 % (ref 36–66)
NEUTS SEG NFR BLD: 0.04 K/UL (ref 1.8–7.7)
NEUTS SEG NFR BLD: 0.14 K/UL (ref 1.8–7.7)
NRBC BLD-RTO: 0 PER 100 WBC
NRBC BLD-RTO: 0 PER 100 WBC
PLATELET # BLD AUTO: ABNORMAL K/UL (ref 138–453)
PLATELET # BLD AUTO: ABNORMAL K/UL (ref 138–453)
PLATELET, FLUORESCENCE: 22 K/UL (ref 138–453)
PLATELET, FLUORESCENCE: 7 K/UL (ref 138–453)
PLATELETS.RETICULATED NFR BLD AUTO: 2.7 % (ref 1.1–10.3)
PLATELETS.RETICULATED NFR BLD AUTO: 3.6 % (ref 1.1–10.3)
POTASSIUM SERPL-SCNC: 4.4 MMOL/L (ref 3.7–5.3)
PROT SERPL-MCNC: 5.1 G/DL (ref 6.6–8.7)
RBC # BLD AUTO: 2.36 M/UL (ref 4.21–5.77)
RBC # BLD AUTO: 2.53 M/UL (ref 4.21–5.77)
SODIUM SERPL-SCNC: 136 MMOL/L (ref 136–145)
TRANSFUSION STATUS: NORMAL
UNIT DIVISION: 0
UNIT ISSUE DATE/TIME: NORMAL
WBC OTHER # BLD: 0.5 K/UL (ref 3.5–11.3)
WBC OTHER # BLD: 0.9 K/UL (ref 3.5–11.3)

## 2024-11-30 PROCEDURE — 85055 RETICULATED PLATELET ASSAY: CPT

## 2024-11-30 PROCEDURE — 36430 TRANSFUSION BLD/BLD COMPNT: CPT

## 2024-11-30 PROCEDURE — 86901 BLOOD TYPING SEROLOGIC RH(D): CPT

## 2024-11-30 PROCEDURE — 2580000003 HC RX 258: Performed by: INTERNAL MEDICINE

## 2024-11-30 PROCEDURE — 94761 N-INVAS EAR/PLS OXIMETRY MLT: CPT

## 2024-11-30 PROCEDURE — 86923 COMPATIBILITY TEST ELECTRIC: CPT

## 2024-11-30 PROCEDURE — 2580000003 HC RX 258: Performed by: NURSE PRACTITIONER

## 2024-11-30 PROCEDURE — 80053 COMPREHEN METABOLIC PANEL: CPT

## 2024-11-30 PROCEDURE — 85025 COMPLETE CBC W/AUTO DIFF WBC: CPT

## 2024-11-30 PROCEDURE — 86850 RBC ANTIBODY SCREEN: CPT

## 2024-11-30 PROCEDURE — 86900 BLOOD TYPING SEROLOGIC ABO: CPT

## 2024-11-30 PROCEDURE — 36415 COLL VENOUS BLD VENIPUNCTURE: CPT

## 2024-11-30 PROCEDURE — 99232 SBSQ HOSP IP/OBS MODERATE 35: CPT | Performed by: INTERNAL MEDICINE

## 2024-11-30 PROCEDURE — P9016 RBC LEUKOCYTES REDUCED: HCPCS

## 2024-11-30 PROCEDURE — 99232 SBSQ HOSP IP/OBS MODERATE 35: CPT | Performed by: STUDENT IN AN ORGANIZED HEALTH CARE EDUCATION/TRAINING PROGRAM

## 2024-11-30 PROCEDURE — P9073 PLATELETS PHERESIS PATH REDU: HCPCS

## 2024-11-30 PROCEDURE — 1200000000 HC SEMI PRIVATE

## 2024-11-30 PROCEDURE — 6360000002 HC RX W HCPCS: Performed by: STUDENT IN AN ORGANIZED HEALTH CARE EDUCATION/TRAINING PROGRAM

## 2024-11-30 RX ORDER — SODIUM CHLORIDE 9 MG/ML
INJECTION, SOLUTION INTRAVENOUS PRN
Status: DISCONTINUED | OUTPATIENT
Start: 2024-11-30 | End: 2024-12-01

## 2024-11-30 RX ADMIN — ALTEPLASE 1 MG: 2.2 INJECTION, POWDER, LYOPHILIZED, FOR SOLUTION INTRAVENOUS at 12:06

## 2024-11-30 RX ADMIN — SODIUM CHLORIDE, PRESERVATIVE FREE 10 ML: 5 INJECTION INTRAVENOUS at 20:07

## 2024-11-30 RX ADMIN — SODIUM CHLORIDE, PRESERVATIVE FREE 10 ML: 5 INJECTION INTRAVENOUS at 09:02

## 2024-11-30 NOTE — PROGRESS NOTES
Today's Date: 11/30/2024  Patient Name: Osbaldo Duff  Date of admission: 11/21/2024  2:53 PM  Patient's age: 69 y.o., 1955  Admission Dx: Acute leukemia, in relapse (HCC) [C95.02]  Acute leukemia not having achieved remission (HCC) [C95.00]    Reason for Consult: acute lukemia, sent from office   Requesting Physician: No admitting provider for patient encounter.    CHIEF COMPLAINT:    Chief Complaint   Patient presents with    Abnormal Lab       History Obtained From:  patient and chart  INTERVAL HISTORY:    Day 6 chemotherapy  Patient seen and examined  NO fever chills  ECHO noted  Platelet 10  Hemoglobin 7.9  WBC at 1.2    HISTORY OF PRESENT ILLNESS:    Osbaldo Duff is a 69 y.o. male who is admitted to the hospital on 11/21/2024  for anemia, low hb and acute leukemia diagnosis    Patient was following with Dr. Gallego as outpatient for possible suspected bone marrow pathology including MDS/leukemia.    Bone marrow biopsy was performed on 11/11/2024 and was unclear diagnosis here therefore was sent to [facility] which confirmed the diagnosis of acute erythroid leukemia.    Patient has pancytopenia.  Patient is now sent to ER for further management and likely administration of induction chemotherapy.    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  History was obtained through an .  Patient understand very limited English.  Patient has not seen a doctor for a while.  Was seen by primary care provider due to complaints of fatigue also having low-grade fever.  Lab workup showed significantly abnormalities including severe  leukemia treatment protocol  Continue chemotherapy  Monitor closely  Patient and the family had a number of reasonable questions which were answered to their satisfaction.  They verbalized understanding of the information provided and they agreed to proceed as outlined above.       Discussed with patient and family and Nurse.                                  Lynette Oliver MD                          [facility]                            This note is created with the assistance of a speech recognition program.  While intending to generate a document that actually reflects the content of the visit, the document can still have some errors including those of syntax and sound a like substitutions which may escape proof reading.  It such instances, actual meaning can be extrapolated by contextual diversion.

## 2024-11-30 NOTE — PROGRESS NOTES
[facility]  Office: [phone]  Carlos Ramírez DO, Jerry Washington DO, Keo Esquivel DO, Matthieu Steven DO, Janet Barr MD, Ivy Rome MD, Kimberlyn Jenkins MD, Deborah Dunbar MD,  Nick Garcia MD, Carlton Roth MD, Hellen Segura MD,  Cherry Dee DO, Carlos Doe MD, Cullen Damon MD, Alban Ramírez DO, Carol Wyatt MD,  Andrew Plata DO, Juanis Lees MD, Hui Laughlin MD, Mira Gee MD, Sonia Escobedo MD,  Gil Wheat MD, Chi Bang MD, Refugio Elliott MD, Livia Hinojosa MD, Sumit Michel MD, Mika Benjamin MD, Presley Hughes DO, Billy Pederson MD, Shirley Waterhouse, CNP,  Corin Kennedy CNP, Presley Billy, JIMENEZ,  Sia Hancock, BESSY, Grace Romo, CNP, Nicole Vargas, CNP, Carolynn Mckinney, CNP, Clau Russell, CNP, Faye Siu PA-C, Elena Rosen PA-C, Mandy Juárez, JIMENEZ, Mc Woo, CNP,  Lupe Ramirez, CNP, Valeria Tai, CNP,  Niru Man, CNP, Janessa Carter, CNP         [facility]   IN-PATIENT SERVICE   [facility]    Progress Note    11/30/2024    11:53 AM    Name:   Osbaldo Duff  MRN:     [id number]     Acct:      [id number]   Room:   0437/0437-01   Day:  9  Admit Date:  11/21/2024  2:53 PM    PCP:   No primary care provider on file.  Code Status:  Full Code    Subjective:     C/C:   Chief Complaint   Patient presents with    Abnormal Lab     Interval History Status: not changed.     Patient seen examined at bedside. Headache improved with no medications. No issues sleeping.     Brief History:     69-year-old male diagnosed with acute leukemia, with prior induction therapy currently being admitted for continuation of induction chemotherapy as well as monitoring of blood counts.    Review of Systems:     Constitutional:  negative for chills, fevers, sweats  Respiratory:  negative for cough, dyspnea on exertion, shortness of breath, wheezing  Cardiovascular:  negative for chest pain, chest  Summary (Last 24 hours) at 11/30/2024 1153  Last data filed at 11/30/2024 0902  Gross per 24 hour   Intake 10 ml   Output --   Net 10 ml       Labs:  Hematology:  Recent Labs     11/28/24  0609 11/29/24  0605 11/29/24  1603 11/30/24  0334   WBC 1.2* 0.5*  --  0.9*   RBC 2.86* 2.56*  --  2.36*   HGB 7.9* 7.0*  --  6.7*   HCT 24.5* 23.4*  --  20.4*   MCV 85.7 91.4  --  86.4   MCH 27.6 27.3  --  28.4   MCHC 32.2 29.9  --  32.8   RDW 17.1* 16.6*  --  16.2*   PLT See Reflexed IPF Result See Reflexed IPF Result See Reflexed IPF Result See Reflexed IPF Result     Chemistry:  Recent Labs     11/28/24  0609 11/29/24  0605 11/30/24  0334    137 136   K 4.9 5.0 4.4    109* 107   CO2 20 19* 22   GLUCOSE 147* 138* 92   BUN 31* 29* 28*   CREATININE 1.1 1.0 1.0   ANIONGAP 10 9 7*   LABGLOM 73 82 82   CALCIUM 8.5* 8.3* 8.3*     Recent Labs     11/28/24  0609 11/29/24  0605 11/30/24  0334   AST 6* 6* 6*   ALT 6* 5* <5*   ALKPHOS 35* 32* 31*   BILITOT 1.1 0.8 1.2     ABG:No results found for: \"POCPH\", \"PHART\", \"PH\", \"POCPCO2\", \"JOR3RYF\", \"PCO2\", \"POCPO2\", \"PO2ART\", \"PO2\", \"POCHCO3\", \"YKG4PHL\", \"HCO3\", \"NBEA\", \"PBEA\", \"BEART\", \"BE\", \"THGBART\", \"THB\", \"NGI8EQY\", \"ITSV7EYD\", \"M2IIFOHN\", \"O2SAT\", \"FIO2\"  No results found for: \"SPECIAL\"  No results found for: \"CULTURE\"    Radiology:  No results found.    Physical Examination:        General appearance:  alert, cooperative and no distress  Mental Status:  oriented to person, place and time and normal affect  Lungs:  clear to auscultation bilaterally, normal effort  Heart:  regular rate and rhythm, no murmur  Abdomen:  soft, nontender, nondistended, normal bowel sounds  Extremities:  no edema, redness, tenderness in the calves  Skin:  no gross lesions, rashes, induration    Assessment:        Hospital Problems             Last Modified POA    * (Principal) Acute erythroid leukemia (HCC) 11/22/2024 Yes    Acute anemia 11/21/2024 Yes    Thrombocytopenia (HCC) 11/21/2024 Yes

## 2024-11-30 NOTE — PLAN OF CARE
Problem: Infection - Adult  Goal: Absence of infection at discharge  11/30/2024 0641 by Cassandra Elizabeth RN  Outcome: Progressing  11/29/2024 1732 by Shannon Webb RN  Outcome: Progressing     Problem: Hematologic - Adult  Goal: Maintains hematologic stability  11/30/2024 0641 by Cassandra Elizabeth RN  Outcome: Progressing  11/29/2024 1732 by Shannon Webb RN  Outcome: Progressing     Problem: Safety - Adult  Goal: Free from fall injury  11/30/2024 0641 by Cassandra Elizabeth RN  Outcome: Progressing  11/29/2024 1732 by Shannon Webb RN  Outcome: Progressing     Problem: Nutrition Deficit:  Goal: Optimize nutritional status  11/30/2024 0641 by Cassandra Elizabeth RN  Outcome: Progressing  11/29/2024 1732 by Shannon Webb RN  Outcome: Progressing

## 2024-12-01 LAB
ALBUMIN SERPL-MCNC: 3.2 G/DL (ref 3.5–5.2)
ALBUMIN/GLOB SERPL: 1.4 {RATIO} (ref 1–2.5)
ALP SERPL-CCNC: 38 U/L (ref 40–129)
ALT SERPL-CCNC: <5 U/L (ref 10–50)
ANION GAP SERPL CALCULATED.3IONS-SCNC: 7 MMOL/L (ref 9–16)
AST SERPL-CCNC: 6 U/L (ref 10–50)
BASOPHILS # BLD: 0 K/UL (ref 0–0.2)
BASOPHILS NFR BLD: 0 % (ref 0–2)
BILIRUB SERPL-MCNC: 1.3 MG/DL (ref 0–1.2)
BLOOD BANK BLOOD PRODUCT EXPIRATION DATE: NORMAL
BLOOD BANK DISPENSE STATUS: NORMAL
BLOOD BANK ISBT PRODUCT BLOOD TYPE: 5100
BLOOD BANK PRODUCT CODE: NORMAL
BLOOD BANK UNIT TYPE AND RH: NORMAL
BPU ID: NORMAL
BUN SERPL-MCNC: 25 MG/DL (ref 8–23)
CALCIUM SERPL-MCNC: 8.3 MG/DL (ref 8.6–10.4)
CELLS COUNTED: 50
CHLORIDE SERPL-SCNC: 108 MMOL/L (ref 98–107)
CO2 SERPL-SCNC: 24 MMOL/L (ref 20–31)
COMPONENT: NORMAL
CREAT SERPL-MCNC: 1 MG/DL (ref 0.7–1.2)
EOSINOPHIL # BLD: 0 K/UL (ref 0–0.4)
EOSINOPHILS RELATIVE PERCENT: 0 % (ref 1–4)
ERYTHROCYTE [DISTWIDTH] IN BLOOD BY AUTOMATED COUNT: 15.1 % (ref 11.8–14.4)
GFR, ESTIMATED: 82 ML/MIN/1.73M2
GLUCOSE BLD-MCNC: 122 MG/DL (ref 75–110)
GLUCOSE SERPL-MCNC: 88 MG/DL (ref 74–99)
HCT VFR BLD AUTO: 22 % (ref 40.7–50.3)
HGB BLD-MCNC: 7.2 G/DL (ref 13–17)
IMM GRANULOCYTES # BLD AUTO: 0 K/UL (ref 0–0.3)
IMM GRANULOCYTES NFR BLD: 0 %
LYMPHOCYTES NFR BLD: 0.55 K/UL (ref 1–4.8)
LYMPHOCYTES RELATIVE PERCENT: 92 % (ref 24–44)
MCH RBC QN AUTO: 28.3 PG (ref 25.2–33.5)
MCHC RBC AUTO-ENTMCNC: 32.7 G/DL (ref 28.4–34.8)
MCV RBC AUTO: 86.6 FL (ref 82.6–102.9)
MONOCYTES NFR BLD: 0.01 K/UL (ref 0.1–0.8)
MONOCYTES NFR BLD: 2 % (ref 1–7)
MORPHOLOGY: ABNORMAL
NEUTROPHILS NFR BLD: 6 % (ref 36–66)
NEUTS SEG NFR BLD: 0.04 K/UL (ref 1.8–7.7)
NRBC BLD-RTO: 0 PER 100 WBC
PLATELET # BLD AUTO: ABNORMAL K/UL (ref 138–453)
PLATELET, FLUORESCENCE: 17 K/UL (ref 138–453)
PLATELETS.RETICULATED NFR BLD AUTO: 2.9 % (ref 1.1–10.3)
POTASSIUM SERPL-SCNC: 4.3 MMOL/L (ref 3.7–5.3)
PROT SERPL-MCNC: 5.5 G/DL (ref 6.6–8.7)
RBC # BLD AUTO: 2.54 M/UL (ref 4.21–5.77)
SODIUM SERPL-SCNC: 139 MMOL/L (ref 136–145)
TRANSFUSION STATUS: NORMAL
UNIT DIVISION: 0
UNIT ISSUE DATE/TIME: NORMAL
WBC OTHER # BLD: 0.6 K/UL (ref 3.5–11.3)

## 2024-12-01 PROCEDURE — 6370000000 HC RX 637 (ALT 250 FOR IP): Performed by: INTERNAL MEDICINE

## 2024-12-01 PROCEDURE — 2580000003 HC RX 258: Performed by: INTERNAL MEDICINE

## 2024-12-01 PROCEDURE — 87186 SC STD MICRODIL/AGAR DIL: CPT

## 2024-12-01 PROCEDURE — 87040 BLOOD CULTURE FOR BACTERIA: CPT

## 2024-12-01 PROCEDURE — 82947 ASSAY GLUCOSE BLOOD QUANT: CPT

## 2024-12-01 PROCEDURE — 99291 CRITICAL CARE FIRST HOUR: CPT | Performed by: INTERNAL MEDICINE

## 2024-12-01 PROCEDURE — 1200000000 HC SEMI PRIVATE

## 2024-12-01 PROCEDURE — 99231 SBSQ HOSP IP/OBS SF/LOW 25: CPT | Performed by: STUDENT IN AN ORGANIZED HEALTH CARE EDUCATION/TRAINING PROGRAM

## 2024-12-01 PROCEDURE — 2580000003 HC RX 258: Performed by: NURSE PRACTITIONER

## 2024-12-01 PROCEDURE — 2500000003 HC RX 250 WO HCPCS: Performed by: INTERNAL MEDICINE

## 2024-12-01 PROCEDURE — 85055 RETICULATED PLATELET ASSAY: CPT

## 2024-12-01 PROCEDURE — 87154 CUL TYP ID BLD PTHGN 6+ TRGT: CPT

## 2024-12-01 PROCEDURE — 80053 COMPREHEN METABOLIC PANEL: CPT

## 2024-12-01 PROCEDURE — 6360000002 HC RX W HCPCS: Performed by: INTERNAL MEDICINE

## 2024-12-01 PROCEDURE — 6370000000 HC RX 637 (ALT 250 FOR IP): Performed by: NURSE PRACTITIONER

## 2024-12-01 PROCEDURE — 93005 ELECTROCARDIOGRAM TRACING: CPT | Performed by: INTERNAL MEDICINE

## 2024-12-01 PROCEDURE — 99232 SBSQ HOSP IP/OBS MODERATE 35: CPT | Performed by: INTERNAL MEDICINE

## 2024-12-01 PROCEDURE — 36415 COLL VENOUS BLD VENIPUNCTURE: CPT

## 2024-12-01 PROCEDURE — 85025 COMPLETE CBC W/AUTO DIFF WBC: CPT

## 2024-12-01 PROCEDURE — 87205 SMEAR GRAM STAIN: CPT

## 2024-12-01 PROCEDURE — 6360000002 HC RX W HCPCS: Performed by: NURSE PRACTITIONER

## 2024-12-01 RX ORDER — IBUPROFEN 400 MG/1
400 TABLET, FILM COATED ORAL EVERY 6 HOURS PRN
Status: DISCONTINUED | OUTPATIENT
Start: 2024-12-01 | End: 2024-12-18 | Stop reason: HOSPADM

## 2024-12-01 RX ORDER — PROMETHAZINE HYDROCHLORIDE 25 MG/ML
12.5 INJECTION, SOLUTION INTRAMUSCULAR; INTRAVENOUS EVERY 6 HOURS PRN
Status: DISCONTINUED | OUTPATIENT
Start: 2024-12-01 | End: 2024-12-18 | Stop reason: HOSPADM

## 2024-12-01 RX ORDER — SODIUM CHLORIDE 9 MG/ML
INJECTION, SOLUTION INTRAVENOUS CONTINUOUS
Status: DISCONTINUED | OUTPATIENT
Start: 2024-12-01 | End: 2024-12-02

## 2024-12-01 RX ORDER — 0.9 % SODIUM CHLORIDE 0.9 %
1000 INTRAVENOUS SOLUTION INTRAVENOUS ONCE
Status: DISCONTINUED | OUTPATIENT
Start: 2024-12-02 | End: 2024-12-11

## 2024-12-01 RX ORDER — ACETAMINOPHEN 500 MG
1000 TABLET ORAL EVERY 6 HOURS PRN
Status: DISCONTINUED | OUTPATIENT
Start: 2024-12-01 | End: 2024-12-02

## 2024-12-01 RX ORDER — IBUPROFEN 400 MG/1
400 TABLET, FILM COATED ORAL EVERY 6 HOURS PRN
Status: DISCONTINUED | OUTPATIENT
Start: 2024-12-01 | End: 2024-12-01

## 2024-12-01 RX ORDER — METOPROLOL TARTRATE 1 MG/ML
5 INJECTION, SOLUTION INTRAVENOUS EVERY 4 HOURS PRN
Status: DISCONTINUED | OUTPATIENT
Start: 2024-12-01 | End: 2024-12-02

## 2024-12-01 RX ADMIN — METOPROLOL TARTRATE 5 MG: 5 INJECTION INTRAVENOUS at 23:57

## 2024-12-01 RX ADMIN — SODIUM CHLORIDE, PRESERVATIVE FREE 20 ML: 5 INJECTION INTRAVENOUS at 09:58

## 2024-12-01 RX ADMIN — ACETAMINOPHEN 1000 MG: 500 TABLET ORAL at 23:40

## 2024-12-01 RX ADMIN — SODIUM CHLORIDE: 9 INJECTION, SOLUTION INTRAVENOUS at 20:30

## 2024-12-01 RX ADMIN — METOPROLOL SUCCINATE 25 MG: 25 TABLET, EXTENDED RELEASE ORAL at 09:02

## 2024-12-01 RX ADMIN — ACETAMINOPHEN 650 MG: 325 TABLET ORAL at 23:20

## 2024-12-01 RX ADMIN — SODIUM CHLORIDE, PRESERVATIVE FREE 10 ML: 5 INJECTION INTRAVENOUS at 21:16

## 2024-12-01 RX ADMIN — ONDANSETRON 4 MG: 2 INJECTION INTRAMUSCULAR; INTRAVENOUS at 23:57

## 2024-12-01 RX ADMIN — CEFTRIAXONE SODIUM 1000 MG: 1 INJECTION, POWDER, FOR SOLUTION INTRAMUSCULAR; INTRAVENOUS at 22:30

## 2024-12-01 RX ADMIN — SODIUM CHLORIDE, PRESERVATIVE FREE 10 ML: 5 INJECTION INTRAVENOUS at 21:17

## 2024-12-01 NOTE — PROGRESS NOTES
Today's Date: 12/1/2024  Patient Name: Osbaldo Duff  Date of admission: 11/21/2024  2:53 PM  Patient's age: 69 y.o., 1955  Admission Dx: Acute leukemia, in relapse (HCC) [C95.02]  Acute leukemia not having achieved remission (HCC) [C95.00]    Reason for Consult: acute lukemia, sent from office   Requesting Physician: No admitting provider for patient encounter.    CHIEF COMPLAINT:    Chief Complaint   Patient presents with    Abnormal Lab       History Obtained From:  patient and chart  INTERVAL HISTORY:    Day 9 chemotherapy  Patient seen and examined  NO fever chills  Platelet 17  Hemoglobin 7.2  WBC at 0.6  No fever  HISTORY OF PRESENT ILLNESS:    Osbaldo Duff is a 69 y.o. male who is admitted to the hospital on 11/21/2024  for anemia, low hb and acute leukemia diagnosis    Patient was following with Dr. Gallego as outpatient for possible suspected bone marrow pathology including MDS/leukemia.    Bone marrow biopsy was performed on 11/11/2024 and was unclear diagnosis here therefore was sent to [facility] which confirmed the diagnosis of acute erythroid leukemia.    Patient has pancytopenia.  Patient is now sent to ER for further management and likely administration of induction chemotherapy.    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  History was obtained through an .  Patient understand very limited English.  Patient has not seen a doctor for a while.  Was seen by primary care provider due to complaints of fatigue also having low-grade fever.  Lab workup showed significantly abnormalities including severe  reading.  It such instances, actual meaning can be extrapolated by contextual diversion.

## 2024-12-01 NOTE — PROGRESS NOTES
[facility]  Office: [phone]  Carlos Ramírez DO, Jerry Washington DO, Keo Esquivel DO, Matthieu Steven DO, Janet Barr MD, Ivy Rome MD, Kimberlyn Jenkins MD, Deborah Dunbar MD,  Nick Garcia MD, Carlton Roth MD, Hellen Segura MD,  Cherry Dee DO, Carlos Doe MD, Cullen Damon MD, Alban Ramírez DO, Carol Wyatt MD,  Andrew Plata DO, Juanis Lees MD, Hui Laughlin MD, Mira Gee MD, Sonia Escobedo MD,  Gil Wheat MD, Chi Bang MD, Refugio Elliott MD, Livia Hinojosa MD, Sumit Michel MD, Mika Benjamin MD, Presley Hughes DO, Billy Pederson MD, Shirley Waterhouse, CNP,  Corin Kennedy CNP, Presley Billy, CNP,  Sia Hancock, BESSY, Grace Romo, CNP, Nicole Vargas, CNP, Carolynn Mckinney, CNP, Clau Russell, CNP, Faye Siu PA-C, Elena Rosen PA-C, Mandy Juárez, CNP, Mc Woo, CNP,  Lupe Ramirez, CNP, Valeria Tai, CNP,  Niru Man, CNP, Janessa Carter, CNP         [facility]   IN-PATIENT SERVICE   [facility]    Progress Note    12/1/2024    11:21 AM    Name:   Osbaldo Duff  MRN:     [id number]     Acct:      [id number]   Room:   0437/0437-01   Day:  10  Admit Date:  11/21/2024  2:53 PM    PCP:   No primary care provider on file.  Code Status:  Full Code    Subjective:     C/C:   Chief Complaint   Patient presents with    Abnormal Lab     Interval History Status: not changed.     Patient seen examined at bedside. No headache, feeling well.     Brief History:     69-year-old male diagnosed with acute leukemia, with prior induction therapy currently being admitted for continuation of induction chemotherapy as well as monitoring of blood counts.    Review of Systems:     Constitutional:  negative for chills, fevers, sweats  Respiratory:  negative for cough, dyspnea on exertion, shortness of breath, wheezing  Cardiovascular:  negative for chest pain, chest pressure/discomfort, lower extremity edema,  28.4 28.9 28.3   MCHC 32.8 32.7 32.7   RDW 16.2* 15.2* 15.1*   PLT See Reflexed IPF Result See Reflexed IPF Result See Reflexed IPF Result     Chemistry:  Recent Labs     11/29/24  0605 11/30/24 0334 12/01/24  0704    136 139   K 5.0 4.4 4.3   * 107 108*   CO2 19* 22 24   GLUCOSE 138* 92 88   BUN 29* 28* 25*   CREATININE 1.0 1.0 1.0   ANIONGAP 9 7* 7*   LABGLOM 82 82 82   CALCIUM 8.3* 8.3* 8.3*     Recent Labs     11/29/24  0605 11/30/24 0334 12/01/24  0704   AST 6* 6* 6*   ALT 5* <5* <5*   ALKPHOS 32* 31* 38*   BILITOT 0.8 1.2 1.3*     ABG:No results found for: \"POCPH\", \"PHART\", \"PH\", \"POCPCO2\", \"OOM2SRA\", \"PCO2\", \"POCPO2\", \"PO2ART\", \"PO2\", \"POCHCO3\", \"HOZ4OAP\", \"HCO3\", \"NBEA\", \"PBEA\", \"BEART\", \"BE\", \"THGBART\", \"THB\", \"ZSE3PPH\", \"IUNP1DDI\", \"Q0PVJLOQ\", \"O2SAT\", \"FIO2\"  No results found for: \"SPECIAL\"  No results found for: \"CULTURE\"    Radiology:  No results found.    Physical Examination:        General appearance:  alert, cooperative and no distress  Mental Status:  oriented to person, place and time and normal affect  Lungs:  clear to auscultation bilaterally, normal effort  Heart:  regular rate and rhythm, no murmur  Abdomen:  soft, nontender, nondistended, normal bowel sounds  Extremities:  no edema, redness, tenderness in the calves  Skin:  no gross lesions, rashes, induration    Assessment:        Hospital Problems             Last Modified POA    * (Principal) Acute erythroid leukemia (HCC) 11/22/2024 Yes    Acute anemia 11/21/2024 Yes    Thrombocytopenia (HCC) 11/21/2024 Yes    Acute leukemia not having achieved remission (HCC) 11/22/2024 Yes    HTN (hypertension) 11/21/2024 Yes    Hyperuricemia 11/22/2024 Yes    Admission for chemotherapy 11/22/2024 Yes    Myelosuppression after chemotherapy 11/23/2024 Yes    Chronic kidney disease, stage II (mild) 11/23/2024 Yes       Plan:        Acute leukemia.  Appreciate oncology recommendations.  Continue treatment per recommendations, completed cycle

## 2024-12-01 NOTE — PLAN OF CARE
Problem: Infection - Adult  Goal: Absence of infection at discharge  12/1/2024 1804 by Erika Contreras RN  Outcome: Progressing  12/1/2024 0520 by Joel Kelly RN  Outcome: Progressing     Problem: Hematologic - Adult  Goal: Maintains hematologic stability  12/1/2024 1804 by Erika Contreras RN  Outcome: Progressing  12/1/2024 0520 by Joel Kelly RN  Outcome: Progressing     Problem: Safety - Adult  Goal: Free from fall injury  12/1/2024 1804 by Erika Contreras RN  Outcome: Progressing  12/1/2024 0520 by Joel Kelly RN  Outcome: Progressing     Problem: Nutrition Deficit:  Goal: Optimize nutritional status  12/1/2024 1804 by Erika Contreras RN  Outcome: Progressing  12/1/2024 0520 by Joel Kelly RN  Outcome: Progressing

## 2024-12-02 ENCOUNTER — APPOINTMENT (OUTPATIENT)
Dept: CT IMAGING | Age: 69
DRG: 834 | End: 2024-12-02
Payer: COMMERCIAL

## 2024-12-02 ENCOUNTER — APPOINTMENT (OUTPATIENT)
Dept: GENERAL RADIOLOGY | Age: 69
DRG: 834 | End: 2024-12-02
Payer: COMMERCIAL

## 2024-12-02 PROBLEM — R65.20 SEPSIS DUE TO ESCHERICHIA COLI WITH ACUTE RENAL FAILURE (HCC): Status: ACTIVE | Noted: 2024-12-02

## 2024-12-02 PROBLEM — D70.9 NEUTROPENIA WITH FEVER (HCC): Status: ACTIVE | Noted: 2024-12-02

## 2024-12-02 PROBLEM — I47.10 SVT (SUPRAVENTRICULAR TACHYCARDIA) (HCC): Status: ACTIVE | Noted: 2024-12-02

## 2024-12-02 PROBLEM — A41.51 SEPSIS DUE TO ESCHERICHIA COLI WITH ACUTE RENAL FAILURE (HCC): Status: ACTIVE | Noted: 2024-12-02

## 2024-12-02 PROBLEM — N17.9 SEPSIS DUE TO ESCHERICHIA COLI WITH ACUTE RENAL FAILURE (HCC): Status: ACTIVE | Noted: 2024-12-02

## 2024-12-02 PROBLEM — R50.81 NEUTROPENIA WITH FEVER (HCC): Status: ACTIVE | Noted: 2024-12-02

## 2024-12-02 LAB
ALBUMIN SERPL-MCNC: 3 G/DL (ref 3.5–5.2)
ALBUMIN SERPL-MCNC: 3.4 G/DL (ref 3.5–5.2)
ALBUMIN/GLOB SERPL: 1.4 {RATIO} (ref 1–2.5)
ALBUMIN/GLOB SERPL: 1.5 {RATIO} (ref 1–2.5)
ALLEN TEST: POSITIVE
ALP SERPL-CCNC: 34 U/L (ref 40–129)
ALP SERPL-CCNC: 42 U/L (ref 40–129)
ALT SERPL-CCNC: 7 U/L (ref 10–50)
ALT SERPL-CCNC: 9 U/L (ref 10–50)
ANION GAP SERPL CALCULATED.3IONS-SCNC: 10 MMOL/L (ref 9–16)
ANION GAP SERPL CALCULATED.3IONS-SCNC: 17 MMOL/L (ref 9–16)
AST SERPL-CCNC: 12 U/L (ref 10–50)
AST SERPL-CCNC: 8 U/L (ref 10–50)
B PARAP IS1001 DNA NPH QL NAA+NON-PROBE: NOT DETECTED
B PERT DNA SPEC QL NAA+PROBE: NOT DETECTED
BILIRUB SERPL-MCNC: 0.5 MG/DL (ref 0–1.2)
BILIRUB SERPL-MCNC: 0.5 MG/DL (ref 0–1.2)
BODY TEMPERATURE: 37
BUN SERPL-MCNC: 27 MG/DL (ref 8–23)
BUN SERPL-MCNC: 32 MG/DL (ref 8–23)
C PNEUM DNA NPH QL NAA+NON-PROBE: NOT DETECTED
CALCIUM SERPL-MCNC: 7.3 MG/DL (ref 8.6–10.4)
CALCIUM SERPL-MCNC: 7.8 MG/DL (ref 8.6–10.4)
CHLORIDE SERPL-SCNC: 104 MMOL/L (ref 98–107)
CHLORIDE SERPL-SCNC: 113 MMOL/L (ref 98–107)
CO2 SERPL-SCNC: 12 MMOL/L (ref 20–31)
CO2 SERPL-SCNC: 18 MMOL/L (ref 20–31)
COHGB MFR BLD: 1.4 % (ref 0–5)
CREAT SERPL-MCNC: 1.3 MG/DL (ref 0.7–1.2)
CREAT SERPL-MCNC: 1.6 MG/DL (ref 0.7–1.2)
EKG ATRIAL RATE: 124 BPM
EKG P AXIS: 42 DEGREES
EKG P-R INTERVAL: 166 MS
EKG Q-T INTERVAL: 296 MS
EKG QRS DURATION: 80 MS
EKG QTC CALCULATION (BAZETT): 425 MS
EKG R AXIS: 36 DEGREES
EKG T AXIS: 47 DEGREES
EKG VENTRICULAR RATE: 124 BPM
ERYTHROCYTE [DISTWIDTH] IN BLOOD BY AUTOMATED COUNT: 14.9 % (ref 11.8–14.4)
ERYTHROCYTE [DISTWIDTH] IN BLOOD BY AUTOMATED COUNT: 15 % (ref 11.8–14.4)
ERYTHROCYTE [DISTWIDTH] IN BLOOD BY AUTOMATED COUNT: 15.1 % (ref 11.8–14.4)
FIO2 ON VENT: ABNORMAL %
FIO2: 21
FLUAV RNA NPH QL NAA+NON-PROBE: NOT DETECTED
FLUBV RNA NPH QL NAA+NON-PROBE: NOT DETECTED
GFR, ESTIMATED: 47 ML/MIN/1.73M2
GFR, ESTIMATED: 60 ML/MIN/1.73M2
GLUCOSE SERPL-MCNC: 129 MG/DL (ref 74–99)
GLUCOSE SERPL-MCNC: 160 MG/DL (ref 74–99)
HADV DNA NPH QL NAA+NON-PROBE: NOT DETECTED
HCO3 VENOUS: 18.8 MMOL/L (ref 24–30)
HCOV 229E RNA NPH QL NAA+NON-PROBE: NOT DETECTED
HCOV HKU1 RNA NPH QL NAA+NON-PROBE: NOT DETECTED
HCOV NL63 RNA NPH QL NAA+NON-PROBE: NOT DETECTED
HCOV OC43 RNA NPH QL NAA+NON-PROBE: NOT DETECTED
HCT VFR BLD AUTO: 20 % (ref 40.7–50.3)
HCT VFR BLD AUTO: 21.8 % (ref 40.7–50.3)
HCT VFR BLD AUTO: 22.8 % (ref 40.7–50.3)
HGB BLD-MCNC: 6.5 G/DL (ref 13–17)
HGB BLD-MCNC: 7.1 G/DL (ref 13–17)
HGB BLD-MCNC: 7.3 G/DL (ref 13–17)
HMPV RNA NPH QL NAA+NON-PROBE: NOT DETECTED
HPIV1 RNA NPH QL NAA+NON-PROBE: NOT DETECTED
HPIV2 RNA NPH QL NAA+NON-PROBE: NOT DETECTED
HPIV3 RNA NPH QL NAA+NON-PROBE: NOT DETECTED
HPIV4 RNA NPH QL NAA+NON-PROBE: NOT DETECTED
LACTIC ACID, WHOLE BLOOD: 1.2 MMOL/L (ref 0.7–2.1)
LACTIC ACID, WHOLE BLOOD: 5.8 MMOL/L (ref 0.7–2.1)
M PNEUMO DNA NPH QL NAA+NON-PROBE: NOT DETECTED
MCH RBC QN AUTO: 29 PG (ref 25.2–33.5)
MCH RBC QN AUTO: 29 PG (ref 25.2–33.5)
MCH RBC QN AUTO: 29.1 PG (ref 25.2–33.5)
MCHC RBC AUTO-ENTMCNC: 32 G/DL (ref 28.4–34.8)
MCHC RBC AUTO-ENTMCNC: 32.5 G/DL (ref 28.4–34.8)
MCHC RBC AUTO-ENTMCNC: 32.6 G/DL (ref 28.4–34.8)
MCV RBC AUTO: 89.3 FL (ref 82.6–102.9)
MCV RBC AUTO: 89.3 FL (ref 82.6–102.9)
MCV RBC AUTO: 90.5 FL (ref 82.6–102.9)
NEGATIVE BASE EXCESS, ART: 8.7 MMOL/L (ref 0–2)
NEGATIVE BASE EXCESS, VEN: 5.7 MMOL/L (ref 0–2)
NRBC BLD-RTO: 0 PER 100 WBC
O2 SAT, VEN: 59.6 % (ref 60–85)
PATIENT TEMP: 39.1
PCO2 VENOUS: 35 MM HG (ref 39–55)
PH VENOUS: 7.35 (ref 7.32–7.42)
PLATELET # BLD AUTO: ABNORMAL K/UL (ref 138–453)
PLATELET, FLUORESCENCE: 10 K/UL (ref 138–453)
PLATELET, FLUORESCENCE: 7 K/UL (ref 138–453)
PLATELET, FLUORESCENCE: 9 K/UL (ref 138–453)
PLATELETS.RETICULATED NFR BLD AUTO: 1.8 % (ref 1.1–10.3)
PLATELETS.RETICULATED NFR BLD AUTO: 2.4 % (ref 1.1–10.3)
PLATELETS.RETICULATED NFR BLD AUTO: 4.4 % (ref 1.1–10.3)
PO2 VENOUS: 30.8 MM HG (ref 30–50)
POC HCO3: 14 MMOL/L (ref 21–28)
POC O2 SATURATION: 95.6 % (ref 94–98)
POC PCO2 TEMP: 20.8 MM HG
POC PCO2: 19 MM HG (ref 35–48)
POC PH TEMP: 7.44
POC PH: 7.47 (ref 7.35–7.45)
POC PO2 TEMP: 81.4 MM HG
POC PO2: 70.6 MM HG (ref 83–108)
POTASSIUM SERPL-SCNC: 4.3 MMOL/L (ref 3.7–5.3)
POTASSIUM SERPL-SCNC: 4.6 MMOL/L (ref 3.7–5.3)
PROT SERPL-MCNC: 5.2 G/DL (ref 6.6–8.7)
PROT SERPL-MCNC: 5.7 G/DL (ref 6.6–8.7)
RBC # BLD AUTO: 2.24 M/UL (ref 4.21–5.77)
RBC # BLD AUTO: 2.44 M/UL (ref 4.21–5.77)
RBC # BLD AUTO: 2.52 M/UL (ref 4.21–5.77)
RSV RNA NPH QL NAA+NON-PROBE: NOT DETECTED
RV+EV RNA NPH QL NAA+NON-PROBE: NOT DETECTED
SAMPLE SITE: ABNORMAL
SARS-COV-2 RNA NPH QL NAA+NON-PROBE: NOT DETECTED
SODIUM SERPL-SCNC: 133 MMOL/L (ref 136–145)
SODIUM SERPL-SCNC: 141 MMOL/L (ref 136–145)
SPECIMEN DESCRIPTION: NORMAL
WBC OTHER # BLD: 0.1 K/UL (ref 3.5–11.3)
WBC OTHER # BLD: 0.3 K/UL (ref 3.5–11.3)
WBC OTHER # BLD: 0.4 K/UL (ref 3.5–11.3)

## 2024-12-02 PROCEDURE — 2580000003 HC RX 258: Performed by: INTERNAL MEDICINE

## 2024-12-02 PROCEDURE — APPSS30 APP SPLIT SHARED TIME 16-30 MINUTES: Performed by: NURSE PRACTITIONER

## 2024-12-02 PROCEDURE — 83605 ASSAY OF LACTIC ACID: CPT

## 2024-12-02 PROCEDURE — 6360000002 HC RX W HCPCS

## 2024-12-02 PROCEDURE — 99222 1ST HOSP IP/OBS MODERATE 55: CPT | Performed by: NURSE PRACTITIONER

## 2024-12-02 PROCEDURE — 87081 CULTURE SCREEN ONLY: CPT

## 2024-12-02 PROCEDURE — 80053 COMPREHEN METABOLIC PANEL: CPT

## 2024-12-02 PROCEDURE — 85014 HEMATOCRIT: CPT

## 2024-12-02 PROCEDURE — 85049 AUTOMATED PLATELET COUNT: CPT

## 2024-12-02 PROCEDURE — 87086 URINE CULTURE/COLONY COUNT: CPT

## 2024-12-02 PROCEDURE — 71045 X-RAY EXAM CHEST 1 VIEW: CPT

## 2024-12-02 PROCEDURE — 36600 WITHDRAWAL OF ARTERIAL BLOOD: CPT

## 2024-12-02 PROCEDURE — 85025 COMPLETE CBC W/AUTO DIFF WBC: CPT

## 2024-12-02 PROCEDURE — 6360000002 HC RX W HCPCS: Performed by: INTERNAL MEDICINE

## 2024-12-02 PROCEDURE — 82803 BLOOD GASES ANY COMBINATION: CPT

## 2024-12-02 PROCEDURE — 85055 RETICULATED PLATELET ASSAY: CPT

## 2024-12-02 PROCEDURE — 85018 HEMOGLOBIN: CPT

## 2024-12-02 PROCEDURE — 2580000003 HC RX 258

## 2024-12-02 PROCEDURE — 99233 SBSQ HOSP IP/OBS HIGH 50: CPT | Performed by: INTERNAL MEDICINE

## 2024-12-02 PROCEDURE — 0202U NFCT DS 22 TRGT SARS-COV-2: CPT

## 2024-12-02 PROCEDURE — 6370000000 HC RX 637 (ALT 250 FOR IP): Performed by: INTERNAL MEDICINE

## 2024-12-02 PROCEDURE — 99291 CRITICAL CARE FIRST HOUR: CPT | Performed by: INTERNAL MEDICINE

## 2024-12-02 PROCEDURE — P9073 PLATELETS PHERESIS PATH REDU: HCPCS

## 2024-12-02 PROCEDURE — 87641 MR-STAPH DNA AMP PROBE: CPT

## 2024-12-02 PROCEDURE — 2500000003 HC RX 250 WO HCPCS

## 2024-12-02 PROCEDURE — 86403 PARTICLE AGGLUT ANTBDY SCRN: CPT

## 2024-12-02 PROCEDURE — P9016 RBC LEUKOCYTES REDUCED: HCPCS

## 2024-12-02 PROCEDURE — 36415 COLL VENOUS BLD VENIPUNCTURE: CPT

## 2024-12-02 PROCEDURE — 2580000003 HC RX 258: Performed by: NURSE PRACTITIONER

## 2024-12-02 PROCEDURE — 2000000000 HC ICU R&B

## 2024-12-02 PROCEDURE — 82805 BLOOD GASES W/O2 SATURATION: CPT

## 2024-12-02 PROCEDURE — 85027 COMPLETE CBC AUTOMATED: CPT

## 2024-12-02 PROCEDURE — 36430 TRANSFUSION BLD/BLD COMPNT: CPT

## 2024-12-02 RX ORDER — MIDODRINE HYDROCHLORIDE 5 MG/1
2.5 TABLET ORAL
Status: DISCONTINUED | OUTPATIENT
Start: 2024-12-02 | End: 2024-12-02

## 2024-12-02 RX ORDER — 0.9 % SODIUM CHLORIDE 0.9 %
1000 INTRAVENOUS SOLUTION INTRAVENOUS ONCE
Status: COMPLETED | OUTPATIENT
Start: 2024-12-02 | End: 2024-12-02

## 2024-12-02 RX ORDER — MIDODRINE HYDROCHLORIDE 5 MG/1
10 TABLET ORAL 3 TIMES DAILY PRN
Status: DISCONTINUED | OUTPATIENT
Start: 2024-12-02 | End: 2024-12-07

## 2024-12-02 RX ORDER — SODIUM CHLORIDE 9 MG/ML
INJECTION, SOLUTION INTRAVENOUS PRN
Status: DISCONTINUED | OUTPATIENT
Start: 2024-12-02 | End: 2024-12-05

## 2024-12-02 RX ORDER — NOREPINEPHRINE BITARTRATE 0.06 MG/ML
1-100 INJECTION, SOLUTION INTRAVENOUS CONTINUOUS
Status: DISCONTINUED | OUTPATIENT
Start: 2024-12-02 | End: 2024-12-06

## 2024-12-02 RX ORDER — MIDODRINE HYDROCHLORIDE 5 MG/1
10 TABLET ORAL 3 TIMES DAILY PRN
Status: DISCONTINUED | OUTPATIENT
Start: 2024-12-02 | End: 2024-12-02 | Stop reason: SDUPTHER

## 2024-12-02 RX ADMIN — SODIUM CHLORIDE: 9 INJECTION, SOLUTION INTRAVENOUS at 06:17

## 2024-12-02 RX ADMIN — VANCOMYCIN HYDROCHLORIDE 1250 MG: 1.25 INJECTION, POWDER, LYOPHILIZED, FOR SOLUTION INTRAVENOUS at 08:42

## 2024-12-02 RX ADMIN — SODIUM CHLORIDE: 9 INJECTION, SOLUTION INTRAVENOUS at 11:46

## 2024-12-02 RX ADMIN — SODIUM BICARBONATE: 84 INJECTION, SOLUTION INTRAVENOUS at 14:40

## 2024-12-02 RX ADMIN — PIPERACILLIN AND TAZOBACTAM 3375 MG: 3; .375 INJECTION, POWDER, LYOPHILIZED, FOR SOLUTION INTRAVENOUS at 20:03

## 2024-12-02 RX ADMIN — Medication 5 MCG/MIN: at 06:33

## 2024-12-02 RX ADMIN — SODIUM CHLORIDE 1000 ML: 9 INJECTION, SOLUTION INTRAVENOUS at 04:36

## 2024-12-02 RX ADMIN — MIDODRINE HYDROCHLORIDE 10 MG: 5 TABLET ORAL at 04:36

## 2024-12-02 RX ADMIN — SODIUM CHLORIDE, PRESERVATIVE FREE 10 ML: 5 INJECTION INTRAVENOUS at 20:03

## 2024-12-02 RX ADMIN — PIPERACILLIN AND TAZOBACTAM 3375 MG: 3; .375 INJECTION, POWDER, LYOPHILIZED, FOR SOLUTION INTRAVENOUS at 00:44

## 2024-12-02 RX ADMIN — PIPERACILLIN AND TAZOBACTAM 3375 MG: 3; .375 INJECTION, POWDER, LYOPHILIZED, FOR SOLUTION INTRAVENOUS at 11:48

## 2024-12-02 ASSESSMENT — PAIN SCALES - GENERAL: PAINLEVEL_OUTOF10: 0

## 2024-12-02 NOTE — PROGRESS NOTES
[facility]     [place]    PATIENT NAME: Osbaldo Duff    Shift date: 12/01/2024  Shift day: Sunday   Shift # 2    Room # 0437/0437-01   Name: Osbaldo Duff            Age: 69 y.o.  Gender: male          Hoahaoism: Scientologist   Place of [place]ism: UNK    Trauma/Incident type: Rapid Response   Admit Date & Time: 11/21/2024  2:53 PM  TRAUMA NAME: N/A    ADVANCE DIRECTIVES IN CHART?  No    NAME OF DECISION MAKER: N/A    RELATIONSHIP OF DECISION MAKER TO PATIENT: N/A    PATIENT/EVENT DESCRIPTION:  Osbaldo Duff is a 69 y.o. male who is in the Hospital. Upon entering the room patient was unavailable due to medical staff providing care. Patient had an elevated heart rate and high fever 104.7 degrees. Family at bedside assisting staff. 0437/0437-01.         SPIRITUAL ASSESSMENT-INTERVENTION-OUTCOME:  Writer introduced self as . Writer provided a ministry of presence.  spoke with family members. Family members requested prayer but asked to wait a moment until patient temp. from fever goes down. Writer transferred patient to other  due to shift change. Writer provided  with patient details concerning Rapid Response.        WHAT IS YOUR SPIRITUAL CARE PLAN FOR THIS PATIENT?:   will remain available to provide spiritual and emotional care.     Electronically signed by Alejandra Valladares,Chaplain Resident, on 12/2/2024 at 12:18 AM.  [facility]  [phone]

## 2024-12-02 NOTE — PROGRESS NOTES
Yes    Acute leukemia not having achieved remission (HCC) 2024 Yes    HTN (hypertension) 2024 Yes    Hyperuricemia 2024 Yes    Admission for chemotherapy 2024 Yes    Myelosuppression after chemotherapy 2024 Yes    Chronic kidney disease, stage II (mild) 2024 Yes       Medications:     Allergies:  No Known Allergies    Current Meds:   Scheduled Meds:    [START ON 2024] sodium chloride  1,000 mL IntraVENous Once    [START ON 2024] piperacillin-tazobactam  3,375 mg IntraVENous Q8H    sodium chloride flush  5-40 mL IntraVENous 2 times per day    metoprolol succinate  25 mg Oral Daily    sodium chloride flush  5-40 mL IntraVENous 2 times per day    lidocaine 1 % injection  50 mg IntraDERmal Once     Continuous Infusions:    sodium chloride 75 mL/hr at 24    sodium chloride      sodium chloride       PRN Meds: metoprolol, acetaminophen **OR** acetaminophen, ibuprofen, promethazine, LORazepam, sodium chloride flush, sodium chloride, potassium chloride **OR** potassium alternative oral replacement **OR** potassium chloride, ondansetron **OR** ondansetron, polyethylene glycol, bisacodyl, sodium chloride flush, sodium chloride    Data:     Past Medical History:   has a past medical history of Acute erythroid leukemia (HCC), Anemia, Thrombocytopenia (HCC), and Wears dentures.    Vitals:  BP (!) 157/79   Pulse 89   Temp (!) 104.2 °F (40.1 °C)   Resp 24   Ht 1.778 m (5' 10\")   Wt 80.9 kg (178 lb 5.6 oz)   SpO2 100%   BMI 25.59 kg/m²   Temp (24hrs), Av.9 °F (37.7 °C), Min:97.9 °F (36.6 °C), Max:104.2 °F (40.1 °C)    Recent Labs     24  2342   POCGLU 122*       I/O (24Hr):    Intake/Output Summary (Last 24 hours) at 2024 2356  Last data filed at 2024 0958  Gross per 24 hour   Intake 20 ml   Output --   Net 20 ml       Labs:    [unfilled]    Lab Results   Component Value Date/Time    SPECIAL R HAND .05ML 2024 09:06 PM     Lab Results

## 2024-12-02 NOTE — PLAN OF CARE
Responded to rapid response that was called to patient's room.  Per nursing, patient had response called due to elevation in heart rate along with a rapidly rising temperature.  Patient's temperature noted to be 104.2 °F with heart rate in the 150s.  Per nursing, patient had just been given Tylenol about 20 minutes prior to my arrival to the room.  Upon arrival to the room patient is alert, awake, uncomfortable and chronically ill-appearing but not in any significant distress.  His heart rate is noted to be in the 150s.  EKG was performed shortly before my arrival that showed sinus tachycardia.  Patient is maintaining his airway is not in any respiratory distress.  Blood pressure noted to be 160 systolic.  Patient is currently receiving antibiotics due to fever with neutropenia in the setting of leukemia, currently receiving chemotherapy.  Ice packs were being applied to the patient's body while I was in the room.     Patient's admitting hematology oncology service was contacted by nursing.  Dr. Wyatt, Loco attending also at bedside, she confirmed patient is currently receiving antibiotics, infectious disease was also just recently consulted.  At this time patient's airway is intact, he does not require pressor support, I feel he is still appropriate for the unit that he is currently on and does not require MICU transfer.    Veronica Tapia,   Emergency medicine PGY 3  Critical care service

## 2024-12-02 NOTE — CONSENT
Informed Consent for Blood Component Transfusion Note    I have discussed with the patient the rationale for blood component transfusion; its benefits in treating or preventing fatigue, organ damage, or death; and its risk which includes mild transfusion reactions, rare risk of blood borne infection, or more serious but rare reactions. I have discussed the alternatives to transfusion, including the risk and consequences of not receiving transfusion. The patient had an opportunity to ask questions and had agreed to proceed with transfusion of blood components.    Electronically signed by Mando Saez MD on 12/2/24 at 11:14 AM EST

## 2024-12-02 NOTE — PLAN OF CARE
Problem: Infection - Adult  Goal: Absence of infection at discharge  12/2/2024 0211 by Joel Kelly RN  Outcome: Progressing  12/1/2024 1804 by Erika Contreras RN  Outcome: Progressing     Problem: Hematologic - Adult  Goal: Maintains hematologic stability  12/2/2024 0211 by Joel Kelly RN  Outcome: Progressing  12/1/2024 1804 by Erika Contreras RN  Outcome: Progressing     Problem: Safety - Adult  Goal: Free from fall injury  12/2/2024 0211 by Joel Kelly RN  Outcome: Progressing  12/1/2024 1804 by Erika Contreras RN  Outcome: Progressing     Problem: Nutrition Deficit:  Goal: Optimize nutritional status  12/2/2024 0211 by Joel Kelly RN  Outcome: Progressing  12/1/2024 1804 by Erika Contreras RN  Outcome: Progressing

## 2024-12-02 NOTE — PROGRESS NOTES
[facility] [place] [facility] [place]    Shift date: 12/1/24  Shift day: Sunday   Shift # 3    Room # 0437/0437-01   Name: Osbaldo Duff                Temple: [place]   Place of Scientology:     Referral: Rapid Response    Admit Date & Time: 11/21/2024  2:53 PM    Assessment:  Osbaldo Duff is a 69 y.o. male in the hospital because of acute erythroid leukemia.   responded to Rapid Response due to concerns for acute hypotension.      Intervention:   was ministry of presence.  acknowledged family members, patient's daughter and wife. Patient being transferred to room 3014.  provided spiritual and emotional support.    Outcome:      Plan:  Chaplains are available 24/7 via Perfect serve.      Electronically signed by Chaplain Charlee, on 12/2/2024 at 5:44 AM.  [facility]  [phone]

## 2024-12-02 NOTE — CONSULTS
VAT consulted for leaking at PICC insertion site.  Assessment reveals CDI dressing last changed 12/02/2024, small amount of dried blood at insertion site (likely due to dressing change).  1 cm external; same as charted from insertion note.  US assessment shows compliant anechoic blood vessel and PICC is well visualized within.  VAT recommends external pressure dressing if needed.

## 2024-12-02 NOTE — CONSULTS
ATTESTATION:    I have discussed the case, including pertinent history and exam findings with the APRN. I have evaluated the  History, physical findings and pictures of the patient and the key elements of the encounter have been performed by me. I have reviewed the laboratory data, other diagnostic studies and discussed them with the APRN. I have updated the medical record where necessary.    I agree with the assessment, plan and orders as documented by the APRN.    In addition diagnostic and decision making elements, performed by the Attending Physician, are included in the Diagnostic and Decision Making Section of the text.    Elements of Medical Decision Making:  Note: I have independently performed the steps listed below as part of the medical decision making and evaluation.   Examined and discussed with patient.  Septicemia, gram-negative  E. coli septicemia 12/1/2024  Fevers  Acute erythroid leukemia  Status post 7-3 chemotherapy started on 11/20/2024  Pancytopenia  Hypotension  HALLE on CKD 2  Labs, medications, radiologic studies were reviewed with personal review of films  Radiologic studies  Lab work  Cultures  E. coli septicemia  Large amounts of data were reviewed  Please see history of present illness below  Discussed with nursing Staff, Discharge planner  Critical care residents  Infection Control and Prevention measures reviewed  Universal precaution  Contact isolation  All prior entries were reviewed  Administer medications as ordered  Zosyn pending further data  Adjusted for renal insufficiency  Prognosis:   Guarded  Discharge planning reviewed  Follow up as outpatient.      Maicol Puckett MD     12/2/2024            [facility] - Initial Consult Note  Today's Date and Time: 12/2/2024, 2:49 PM    Impression :   E. Coli septicemia-detected 12/1/24  Fever  Acute erythroid leukemia  S/p 7-3 chemotherapy that was initiated on 11/20/24  Pancytopenia  Hypotension

## 2024-12-02 NOTE — PROGRESS NOTES
[facility]   Pharmacy Pharmacokinetic Monitoring Service - Vancomycin     Osbaldo Duff is a 69 y.o. male starting on vancomycin therapy for sepsis. Pharmacy consulted by Dr SUJATA Saez for monitoring and adjustment.    Target Concentration: Goal AUC/OCTAVIO 400-600 mg*hr/L    Additional Antimicrobials: Zosyn    Pertinent Laboratory Values:   Wt Readings from Last 1 Encounters:   12/02/24 76.3 kg (168 lb 3.2 oz)     Temp Readings from Last 1 Encounters:   12/02/24 97.5 °F (36.4 °C) (Oral)     Estimated Creatinine Clearance: 58 mL/min (A) (based on SCr of 1.3 mg/dL (H)).  Recent Labs     12/01/24  0704 12/02/24  0010   CREATININE 1.0 1.3*   BUN 25* 27*   WBC 0.6* 0.1*     Pertinent Cultures:  Culture Date Source Results   12.01 Blood x2 Pending   MRSA Nasal Swab: pending    Plan:  Start vancomycin 1250mg IV Q24H  Anticipated AUC of 450 and trough concentration of 11 at steady state  Renal labs as indicated   Pharmacy will continue to monitor patient and adjust therapy as indicated    Thank you for the consult,  Quyen Garcia, ElizabethD BCPS [facility] Center[facility]  12/2/2024 8:06 AM

## 2024-12-02 NOTE — H&P
Critical Care - History and Physical Examination    Patient's name:  Osbaldo Duff  Medical Record Number: [id number]  Patient's account/billing number: [id number]  Patient's YOB: 1955  Age: 69 y.o.  Date of Admission: 11/21/2024  2:53 PM  Reason of ICU admission:   Date of History and Physical Examination: 12/2/2024      Primary Care Physician: No primary care provider on file.  Attending Physician:    Code Status: Full Code    Chief complaint: Acute leukemia, admission for induction chemotherapy and monitoring of blood cell counts    Reason for ICU admission: acute hypotension      History Of Present Illness:   History was obtained from chart review and the patient.      Osbaldo Duff is a 69 y.o. 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  Patient has been undergoing chemotherapy on the floor, following with heme-onc while inpatient.  Patient receiving cytarabine and Decadron while inpatient.  Overnight, patient had rapid response called initially due to concerns for tachycardia with rates into the 150s as well as fevers up to 104 °F.  At that time, patient had ice packs applied to his body and was given Tylenol.  This did briefly resolve his symptoms.  Rapid response was called again early this morning, concerns for persistent hypotension despite fluid bolus.  Patient's maps were sustained in the 50s.  Patient asymptomatic at that time denying chest pain, shortness of breath, dizziness.  Patient requiring transfer to ICU at that time due to need for pressor support.    After initial rapid response, admitting team had changed antibiotics to Zosyn and consulted infectious disease.  CT sinuses, abdomen pelvis, chest x-ray was also ordered at that time to rule out infectious cause    Overall there is significant concern for possible septic  shock in the setting of neutropenia due to chemotherapy.    Most recent lab work showing WBC count of 0.1, hemoglobin of 7.3, platelet count of 9    Past Medical History:        Diagnosis Date    Acute erythroid leukemia (HCC) 11/22/2024    Anemia     Thrombocytopenia (HCC)     Wears dentures        Past Surgical History:        Procedure Laterality Date    CT BONE MARROW BIOPSY  11/11/2024    CT BONE MARROW BIOPSY 11/11/2024 [facility] CT SCAN    HAND SURGERY      INGUINAL HERNIA REPAIR         Allergies:    No Known Allergies      Home Medications:   Prior to Admission medications    Medication Sig Start Date End Date Taking? Authorizing Provider   metoprolol succinate (TOPROL XL) 25 MG extended release tablet Take 1 tablet by mouth daily 11/1/24  Yes Provider, MD Dexter   Multiple Vitamin (MULTIVITAMIN ADULT PO) Take by mouth   Yes Provider, Dexter, MD       Social History:   TOBACCO:   reports that he has never smoked. He has never used smokeless tobacco.  ETOH:   reports that he does not currently use alcohol.  DRUGS:  reports no history of drug use.  OCCUPATION:      Family History:       Problem Relation Age of Onset    Stroke Mother     Stroke Father            REVIEW OF SYSTEMS (ROS):  Review of Systems   Constitutional:  Positive for fever.   Respiratory:  Negative for shortness of breath.    Cardiovascular:  Negative for chest pain.   Gastrointestinal:  Negative for abdominal pain.   Musculoskeletal:  Negative for back pain.   Neurological:  Negative for headaches.        Physical Exam:    Vitals: BP (!) 77/45   Pulse 90   Temp 98.2 °F (36.8 °C) (Axillary)   Resp 16   Ht 1.778 m (5' 10\")   Wt 80.9 kg (178 lb 5.6 oz)   SpO2 99%   BMI 25.59 kg/m²     Body weight:   Wt Readings from Last 3 Encounters:   11/27/24 80.9 kg (178 lb 5.6 oz)   11/04/24 80.5 kg (177 lb 8 oz)   11/01/24 81.9 kg (180 lb 8.9 oz)       Body Mass Index : Body mass index is 25.59 kg/m².        PHYSICAL EXAMINATION :

## 2024-12-02 NOTE — PROGRESS NOTES
Status post rapid response called again overnight with concern for acute hypotension.     Status post IV fluid bolus, dose of midodrine with persistent hypotension with systolic pressures 78s to mid 80s, heart rates 90s to 100s    Patient seen by critical care.  Agree to transfer ICU    Still pending imaging ordered earlier tonight to further clarify concern for sepsis.  Discussed with nursing

## 2024-12-02 NOTE — PROGRESS NOTES
1930: pt wife came and got RN, pt is shivering, diaphoretic, feels cold, temp 99.1, temp checked twice as pt wife commented on how forehead felt warm, Dr Oliver notified, continue to monitor.    2015: temp 102.3  pt took 500mg of tylenol, Dr Oliver notified, blood cultures , NS 75mL/hr, ID consult ordered.  Post blood cultures rocephin 1G Q24H ordered.    2230: pt noted to be having steadily increasing HR    2305: EKG performed, sinus tachycardia    2320: 650 mg tylenol given    2330: Rapid response called d/t tachycardia and rapidly increased temperature with associated lethargy.  Temp 104.2 HR maintaining 140s-160s    2340: Dr Wyatt to bedside,  1000mg tylenol given, 1L challenge    2343: temp maintaining with ice packs in place, HR maintaining, pt has bout of N/V    2357: lopressor 5mg IV/zofran 4mg IV given via PICC    0000:     0002:     0004: chest XR, stat CT abdomen when stable per Dr Wyatt d/t N/V, filgrastim-aafi 300mcg SQ one time dose ordered, abx changed to zosyn onc agrees    0005: Dr Wyatt out of room    0006:  cooling blanket arrived in room, temp 103.8 ax    0029: CT called, no answer    0033: chest XR performed    0051: temp 102.7 ax, , /51, O2 96     0200: temp 98.6 ax, , O2 97, pt still on cooling blanket, resting comfortably    0355: pt ready for CT    0400: BP 80/46, onc notified, 1L NS bolus ordered

## 2024-12-03 PROBLEM — D70.9 AGRANULOCYTOSIS (HCC): Status: ACTIVE | Noted: 2024-12-03

## 2024-12-03 LAB
ALBUMIN SERPL-MCNC: 3.1 G/DL (ref 3.5–5.2)
ALBUMIN/GLOB SERPL: 1.2 {RATIO} (ref 1–2.5)
ALP SERPL-CCNC: 39 U/L (ref 40–129)
ALT SERPL-CCNC: 7 U/L (ref 10–50)
ANION GAP SERPL CALCULATED.3IONS-SCNC: 11 MMOL/L (ref 9–16)
AST SERPL-CCNC: 6 U/L (ref 10–50)
BASOPHILS # BLD: 0 K/UL (ref 0–0.2)
BASOPHILS NFR BLD: 0 % (ref 0–2)
BILIRUB SERPL-MCNC: 1 MG/DL (ref 0–1.2)
BLOOD BANK BLOOD PRODUCT EXPIRATION DATE: NORMAL
BLOOD BANK DISPENSE STATUS: NORMAL
BLOOD BANK ISBT PRODUCT BLOOD TYPE: 5100
BLOOD BANK PRODUCT CODE: NORMAL
BLOOD BANK UNIT TYPE AND RH: NORMAL
BPU ID: NORMAL
BUN SERPL-MCNC: 23 MG/DL (ref 8–23)
CALCIUM SERPL-MCNC: 7.6 MG/DL (ref 8.6–10.4)
CHLORIDE SERPL-SCNC: 109 MMOL/L (ref 98–107)
CO2 SERPL-SCNC: 17 MMOL/L (ref 20–31)
COMPONENT: NORMAL
CREAT SERPL-MCNC: 1.1 MG/DL (ref 0.7–1.2)
EOSINOPHIL # BLD: 0 K/UL (ref 0–0.4)
EOSINOPHILS RELATIVE PERCENT: 0 % (ref 1–4)
ERYTHROCYTE [DISTWIDTH] IN BLOOD BY AUTOMATED COUNT: 14.9 % (ref 11.8–14.4)
ERYTHROCYTE [DISTWIDTH] IN BLOOD BY AUTOMATED COUNT: 15 % (ref 11.8–14.4)
GFR, ESTIMATED: 73 ML/MIN/1.73M2
GLUCOSE SERPL-MCNC: 102 MG/DL (ref 74–99)
HCT VFR BLD AUTO: 19.7 % (ref 40.7–50.3)
HCT VFR BLD AUTO: 21.8 % (ref 40.7–50.3)
HCT VFR BLD AUTO: 27.4 % (ref 40.7–50.3)
HGB BLD-MCNC: 6.3 G/DL (ref 13–17)
HGB BLD-MCNC: 7.1 G/DL (ref 13–17)
HGB BLD-MCNC: 8.9 G/DL (ref 13–17)
IMM GRANULOCYTES # BLD AUTO: 0 K/UL (ref 0–0.3)
IMM GRANULOCYTES NFR BLD: 0 %
LYMPHOCYTES NFR BLD: 0.59 K/UL (ref 1–4.8)
LYMPHOCYTES RELATIVE PERCENT: 98 % (ref 24–44)
MCH RBC QN AUTO: 28.9 PG (ref 25.2–33.5)
MCH RBC QN AUTO: 29.1 PG (ref 25.2–33.5)
MCHC RBC AUTO-ENTMCNC: 32 G/DL (ref 28.4–34.8)
MCHC RBC AUTO-ENTMCNC: 32.6 G/DL (ref 28.4–34.8)
MCV RBC AUTO: 89.3 FL (ref 82.6–102.9)
MCV RBC AUTO: 90.4 FL (ref 82.6–102.9)
MICROORGANISM SPEC CULT: NO GROWTH
MONOCYTES NFR BLD: 0.01 K/UL (ref 0.1–0.8)
MONOCYTES NFR BLD: 2 % (ref 1–7)
MORPHOLOGY: ABNORMAL
NEUTROPHILS NFR BLD: 0 % (ref 36–66)
NEUTS SEG NFR BLD: 0 K/UL (ref 1.8–7.7)
NRBC BLD-RTO: 0 PER 100 WBC
NRBC BLD-RTO: 0 PER 100 WBC
PLATELET # BLD AUTO: ABNORMAL K/UL (ref 138–453)
PLATELET # BLD AUTO: ABNORMAL K/UL (ref 138–453)
PLATELET, FLUORESCENCE: 11 K/UL (ref 138–453)
PLATELET, FLUORESCENCE: 9 K/UL (ref 138–453)
PLATELETS.RETICULATED NFR BLD AUTO: 1.2 % (ref 1.1–10.3)
PLATELETS.RETICULATED NFR BLD AUTO: 2.2 % (ref 1.1–10.3)
POTASSIUM SERPL-SCNC: 4 MMOL/L (ref 3.7–5.3)
PROT SERPL-MCNC: 5.6 G/DL (ref 6.6–8.7)
RBC # BLD AUTO: 2.18 M/UL (ref 4.21–5.77)
RBC # BLD AUTO: 2.44 M/UL (ref 4.21–5.77)
SERVICE CMNT-IMP: NORMAL
SODIUM SERPL-SCNC: 137 MMOL/L (ref 136–145)
SPECIMEN DESCRIPTION: NORMAL
TRANSFUSION STATUS: NORMAL
UNIT DIVISION: 0
UNIT ISSUE DATE/TIME: NORMAL
WBC OTHER # BLD: 0.4 K/UL (ref 3.5–11.3)
WBC OTHER # BLD: 0.6 K/UL (ref 3.5–11.3)

## 2024-12-03 PROCEDURE — 94761 N-INVAS EAR/PLS OXIMETRY MLT: CPT

## 2024-12-03 PROCEDURE — P9016 RBC LEUKOCYTES REDUCED: HCPCS

## 2024-12-03 PROCEDURE — 36415 COLL VENOUS BLD VENIPUNCTURE: CPT

## 2024-12-03 PROCEDURE — 6370000000 HC RX 637 (ALT 250 FOR IP)

## 2024-12-03 PROCEDURE — 36430 TRANSFUSION BLD/BLD COMPNT: CPT

## 2024-12-03 PROCEDURE — 2500000003 HC RX 250 WO HCPCS

## 2024-12-03 PROCEDURE — 6360000002 HC RX W HCPCS: Performed by: INTERNAL MEDICINE

## 2024-12-03 PROCEDURE — 80053 COMPREHEN METABOLIC PANEL: CPT

## 2024-12-03 PROCEDURE — 85055 RETICULATED PLATELET ASSAY: CPT

## 2024-12-03 PROCEDURE — 85018 HEMOGLOBIN: CPT

## 2024-12-03 PROCEDURE — 2580000003 HC RX 258: Performed by: NURSE PRACTITIONER

## 2024-12-03 PROCEDURE — 85027 COMPLETE CBC AUTOMATED: CPT

## 2024-12-03 PROCEDURE — 2580000003 HC RX 258: Performed by: INTERNAL MEDICINE

## 2024-12-03 PROCEDURE — P9073 PLATELETS PHERESIS PATH REDU: HCPCS

## 2024-12-03 PROCEDURE — 99232 SBSQ HOSP IP/OBS MODERATE 35: CPT | Performed by: INTERNAL MEDICINE

## 2024-12-03 PROCEDURE — 2000000000 HC ICU R&B

## 2024-12-03 PROCEDURE — 2580000003 HC RX 258

## 2024-12-03 PROCEDURE — 85014 HEMATOCRIT: CPT

## 2024-12-03 PROCEDURE — 99233 SBSQ HOSP IP/OBS HIGH 50: CPT | Performed by: INTERNAL MEDICINE

## 2024-12-03 PROCEDURE — 99291 CRITICAL CARE FIRST HOUR: CPT | Performed by: INTERNAL MEDICINE

## 2024-12-03 PROCEDURE — 85025 COMPLETE CBC W/AUTO DIFF WBC: CPT

## 2024-12-03 RX ORDER — SODIUM CHLORIDE 9 MG/ML
INJECTION, SOLUTION INTRAVENOUS PRN
Status: DISCONTINUED | OUTPATIENT
Start: 2024-12-03 | End: 2024-12-05

## 2024-12-03 RX ORDER — ACETAMINOPHEN 325 MG/1
650 TABLET ORAL EVERY 6 HOURS PRN
Status: DISCONTINUED | OUTPATIENT
Start: 2024-12-03 | End: 2024-12-18 | Stop reason: HOSPADM

## 2024-12-03 RX ADMIN — SODIUM CHLORIDE, PRESERVATIVE FREE 10 ML: 5 INJECTION INTRAVENOUS at 20:26

## 2024-12-03 RX ADMIN — SODIUM CHLORIDE, PRESERVATIVE FREE 10 ML: 5 INJECTION INTRAVENOUS at 20:25

## 2024-12-03 RX ADMIN — PIPERACILLIN AND TAZOBACTAM 3375 MG: 3; .375 INJECTION, POWDER, LYOPHILIZED, FOR SOLUTION INTRAVENOUS at 12:03

## 2024-12-03 RX ADMIN — SODIUM BICARBONATE: 84 INJECTION, SOLUTION INTRAVENOUS at 05:47

## 2024-12-03 RX ADMIN — SODIUM BICARBONATE: 84 INJECTION, SOLUTION INTRAVENOUS at 20:24

## 2024-12-03 RX ADMIN — PIPERACILLIN AND TAZOBACTAM 3375 MG: 3; .375 INJECTION, POWDER, LYOPHILIZED, FOR SOLUTION INTRAVENOUS at 20:25

## 2024-12-03 RX ADMIN — PIPERACILLIN AND TAZOBACTAM 3375 MG: 3; .375 INJECTION, POWDER, LYOPHILIZED, FOR SOLUTION INTRAVENOUS at 04:37

## 2024-12-03 RX ADMIN — ACETAMINOPHEN 650 MG: 325 TABLET ORAL at 07:29

## 2024-12-03 ASSESSMENT — PAIN DESCRIPTION - ORIENTATION
ORIENTATION: ANTERIOR
ORIENTATION: ANTERIOR

## 2024-12-03 ASSESSMENT — PAIN DESCRIPTION - LOCATION
LOCATION: HEAD
LOCATION: HEAD

## 2024-12-03 ASSESSMENT — PAIN - FUNCTIONAL ASSESSMENT
PAIN_FUNCTIONAL_ASSESSMENT: ACTIVITIES ARE NOT PREVENTED
PAIN_FUNCTIONAL_ASSESSMENT: ACTIVITIES ARE NOT PREVENTED

## 2024-12-03 ASSESSMENT — PAIN DESCRIPTION - DESCRIPTORS
DESCRIPTORS: ACHING;DISCOMFORT
DESCRIPTORS: ACHING;DISCOMFORT

## 2024-12-03 ASSESSMENT — PAIN SCALES - GENERAL
PAINLEVEL_OUTOF10: 5
PAINLEVEL_OUTOF10: 5
PAINLEVEL_OUTOF10: 0
PAINLEVEL_OUTOF10: 0

## 2024-12-03 ASSESSMENT — PAIN SCALES - WONG BAKER: WONGBAKER_NUMERICALRESPONSE: NO HURT

## 2024-12-03 NOTE — PROGRESS NOTES
Today's Date: 12/2/2024  Patient Name: Osbaldo Duff  Date of admission: 11/21/2024  2:53 PM  Patient's age: 69 y.o., 1955  Admission Dx: Acute leukemia, in relapse (HCC) [C95.02]  Acute leukemia not having achieved remission (HCC) [C95.00]    Reason for Consult: acute lukemia, sent from office   Requesting Physician: No admitting provider for patient encounter.    CHIEF COMPLAINT:    Chief Complaint   Patient presents with    Abnormal Lab       History Obtained From:  patient and chart  INTERVAL HISTORY:        Interval history:    Patient seen and examined  Labs and vitals reviewed  Patient transferred to the MICU.  Induction day #10 today.  Blood cultures positive for E. coli        HISTORY OF PRESENT ILLNESS:    Osbaldo Duff is a 69 y.o. male who is admitted to the hospital on 11/21/2024  for anemia, low hb and acute leukemia diagnosis    Patient was following with Dr. Gallego as outpatient for possible suspected bone marrow pathology including MDS/leukemia.    Bone marrow biopsy was performed on 11/11/2024 and was unclear diagnosis here therefore was sent to [facility] which confirmed the diagnosis of acute erythroid leukemia.    Patient has pancytopenia.  Patient is now sent to ER for further management and likely administration of induction chemotherapy.    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  History was obtained through an .  Patient understand very limited English.  Patient has not seen a doctor for a while.  Was seen by primary care provider due to complaints of fatigue also having low-grade fever.  Lab

## 2024-12-03 NOTE — PROGRESS NOTES
[facility] - Initial Consult Note  Today's Date and Time: 12/3/2024, 9:56 AM    Impression :   E. Coli septicemia-detected 12/1/24  Fever  Acute erythroid leukemia  S/p 7-3 chemotherapy that was initiated on 11/20/24  Pancytopenia  Hypotension   HALLE on CKD II    Recommendations:   Continue IV Zosyn pending finalized culture data  Renal dosing    Medical Decision Making/Summary/Discussion:12/3/2024     Elements of Medical Decision Making:  Note: I have independently performed the steps listed below as part of the medical decision making and evaluation.   Examined and discussed with patient.  Septicemia, gram-negative  E. coli septicemia 12/1/2024  Fevers  Acute erythroid leukemia  Status post 7-3 chemotherapy started on 11/20/2024  Pancytopenia  Hypotension  HALLE on CKD 2  Labs, medications, radiologic studies were reviewed with personal review of films  Radiologic studies  Lab work  Cultures  E. coli septicemia  Large amounts of data were reviewed  Please see history of present illness below  Discussed with nursing Staff, Discharge planner  Critical care residents  Infection Control and Prevention measures reviewed  Universal precaution  Contact isolation  All prior entries were reviewed  Administer medications as ordered  Zosyn pending further data  Adjusted for renal insufficiency  Prognosis:   Guarded  Discharge planning reviewed  Follow up as outpatient.      Maicol Puckett MD     Infection Control Recommendations   [place] Precautions  Neutropenic precautions    Antimicrobial Stewardship Recommendations     Simplification of therapy  Targeted therapy  Renal dosing    Coordination of Outpatient Care:   Estimated Length of IV antimicrobials:TBD  Patient will need Midline Catheter Insertion: TBD  Patient will need PICC line Insertion:TBD  Patient will need: Home IV , Infusion Center,  SNF,  LTAC: TBD  Patient will need outpatient wound care:     Chief complaint/reason  Performed by multiplexed nucleic acid assay.       Culture, Respiratory [[id number]]     Order Status: Canceled Specimen: Sputum Expectorated     MRSA DNA Probe, Nasal [[id number]] Collected: 12/02/24 0705    Order Status: Sent Specimen: Nares Updated: 12/02/24 0742    Culture, Blood 2 [[id number]]  (Abnormal) Collected: 12/01/24 2106    Order Status: Completed Specimen: Blood Updated: 12/03/24 0837     Specimen Description .BLOOD     Special Requests R HAND .05ML     Culture POSITIVE Blood Culture      DIRECT GRAM STAIN FROM BOTTLE: GRAM NEGATIVE RODS      Escherichia coli Detected: Methodology- Polymerase Chain Reaction (PCR)      ESCHERICHIA COLI      (NOTE) Direct Gram Stain from bottle and Polymerase Chain Reaction (PCR) results called to and read back by:BENJA PURDY  ON [id number]    Culture, Blood 1 [[id number]] Collected: 12/01/24 2103    Order Status: Completed Specimen: Blood Updated: 12/02/24 2202     Specimen Description .BLOOD     Special Requests R AC 2ML     Culture NO GROWTH 1 DAY              Medical Decision Making-Other:     Note:      Thank you for allowing us to participate in the care of this patient. Please call with questions.    Maicol Puckett MD  Pager: [phone] - Office: [phone]

## 2024-12-03 NOTE — PROGRESS NOTES
Critical Care -Progress Note    Patient's name:  Osbaldo Duff  Medical Record Number: [id number]  Patient's account/billing number: [id number]  Patient's YOB: 1955  Age: 69 y.o.  Date of Admission: 11/21/2024  2:53 PM  Date of History and Physical Examination: 12/3/2024      Primary Care Physician: No primary care provider on file.  Attending Physician: Dr He Laughlin    Code Status: Full Code    Chief complaint:   Chief Complaint   Patient presents with    Abnormal Lab       Today's Evaluation     Subjective Evaluation:  Patient had 3 episodes of loose stools.  Patient had a headache.  Denies any other symptoms.  No fever    Objective Evaluation:  Hemodynamically stable.  Saturating well on room air.    Speciality's Recommendations:  ID: Continue Zosyn.  Follow sensitivities.  Hematology: Monitor CBC.  Transfuse as indicated      Feeding: Diet: ADULT DIET; Regular  ADULT ORAL NUTRITION SUPPLEMENT; Breakfast, Dinner; Diabetic Oral Supplement    Fluids: Half-normal saline with 75 mEq bicarb at 75 mL per    Family:  Updated      Analgesic: acetaminophen    Sedation: none    Thrombo-prophylaxis:  none     Mobility: good     Heads up:  30 Degrees    Ulcer prophylaxis:  [] Protonix [] Pepcid [] Sucralfate  [x] Other: Not indicated    Glycemic control: N/A            HISTORY OF PRESENT ILLNESS:      History was obtained from chart review and the patient.       Osbaldo Duff is a 69 y.o. 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  Patient has been undergoing chemotherapy on the floor, following with heme-onc while inpatient.  Patient receiving cytarabine and Decadron while inpatient.  Overnight, patient had rapid response called initially due to concerns for tachycardia with rates into the 150s as well as fevers up to 104 °F.  At that time,  124/74 98.2 °F (36.8 °C) Axillary 77 16 100 %   12/03/24 0920 118/73 99.2 °F (37.3 °C) Axillary 73 21 100 %   12/03/24 0907 114/69 98.1 °F (36.7 °C) Oral 72 16 100 %   12/03/24 0800 115/68 98.2 °F (36.8 °C) Oral 74 18 100 %   12/03/24 0730 -- -- -- 85 16 100 %   12/03/24 0700 117/69 99 °F (37.2 °C) Oral 83 13 100 %         Intake/Output Summary (Last 24 hours) at 12/3/2024 1435  Last data filed at 12/3/2024 1046  Gross per 24 hour   Intake 4826.73 ml   Output 900 ml   Net 3926.73 ml     Date 12/03/24 0000 - 12/03/24 2359   Shift 8326-4346 0461-1265 6872-6345 24 Hour Total   INTAKE   I.V.(mL/kg) 847.3(11.1) 3191.8(41.8)  4039.1(52.9)   Blood(mL/kg) 500(6.6)   500(6.6)   IV Piggyback(mL/kg) 66.5(0.9)   66.5(0.9)   Shift Total(mL/kg) 1413.8(18.5) 3191.8(41.8)  4605.6(60.4)   OUTPUT   Urine(mL/kg/hr) 175(0.3) 75  250   Shift Total(mL/kg) 175(2.3) 75(1)  250(3.3)   Weight (kg) 76.3 76.3 76.3 76.3     Wt Readings from Last 3 Encounters:   12/02/24 76.3 kg (168 lb 3.2 oz)   11/04/24 80.5 kg (177 lb 8 oz)   11/01/24 81.9 kg (180 lb 8.9 oz)     Body mass index is 23.55 kg/m².        PHYSICAL EXAM:   Constitutional: No acute distress  HEENT: NC/AT, PERRL, no congestion or rhinorrhea  Neck: Supple, symmetrical, trachea midline, no adenopathy,  no JVD  Respiratory: Clear to auscultation, no wheezes, rales, rhonchi  Cardiovascular: Regular rate and rhythm, normal S1, S2, no murmur   Abdomen: Soft, nontender, nondistended, no masses or organomegaly  Extremities:   No pedal edema, no clubbing or cyanosis  Neuro: A&O x4, moving all four extremities, strength and sensation intact    MEDICATIONS:  Scheduled Meds:   sodium chloride  1,000 mL IntraVENous Once    piperacillin-tazobactam  3,375 mg IntraVENous Q8H    sodium chloride flush  5-40 mL IntraVENous 2 times per day    sodium chloride flush  5-40 mL IntraVENous 2 times per day    lidocaine 1 % injection  50 mg IntraDERmal Once     Continuous Infusions:   sodium chloride

## 2024-12-03 NOTE — PLAN OF CARE
Problem: Nutrition Deficit:  Goal: Optimize nutritional status  12/3/2024 1049 by Elyse Do, RN  Outcome: Progressing  12/3/2024 0633 by Brigette Pappas, RN  Outcome: Progressing

## 2024-12-03 NOTE — PLAN OF CARE
Problem: Infection - Adult  Goal: Absence of infection at discharge  Outcome: Progressing  Flowsheets (Taken 12/2/2024 2000)  Absence of infection at discharge: Assess and monitor for signs and symptoms of infection     Problem: Hematologic - Adult  Goal: Maintains hematologic stability  Outcome: Progressing  Flowsheets (Taken 12/2/2024 2000)  Maintains hematologic stability: Monitor labs for bleeding or clotting disorders     Problem: Safety - Adult  Goal: Free from fall injury  Outcome: Progressing  Flowsheets (Taken 12/2/2024 2000)  Free From Fall Injury: Instruct family/caregiver on patient safety     Problem: Nutrition Deficit:  Goal: Optimize nutritional status  Outcome: Progressing     Problem: Discharge Planning  Goal: Discharge to home or other facility with appropriate resources  Outcome: Progressing  Flowsheets (Taken 12/2/2024 2000)  Discharge to home or other facility with appropriate resources:   Identify barriers to discharge with patient and caregiver   Arrange for needed discharge resources and transportation as appropriate   Identify discharge learning needs (meds, wound care, etc)   Arrange for interpreters to assist at discharge as needed   Refer to discharge planning if patient needs post-hospital services based on physician order or complex needs related to functional status, cognitive ability or social support system

## 2024-12-03 NOTE — PROGRESS NOTES
Nutrition Assessment     Type and Reason for Visit: Reassess    Nutrition Recommendations/Plan:   Modify ONS: Clear ONS, Frozen ONS BID   Continue to encourage meal intake  Will monitor PO intake, ONS tolerance, wt, and POC     Malnutrition Assessment:  Malnutrition Status: At risk for malnutrition    Nutrition Assessment:  Pt transferred to ICU 2/2 hypotension. RD visited pt. Pt reports poor intake r/t diarrhea. Pt currently on antibiotics. Pt reports tolerating juice and water but not Glucerna. Pt is agreeable to trying Ensure Clear BID and Magic Cup BID. RD discussed w/ RN who reports encouraging PO intake of meals. RD will continue to monitor per protocol.    Estimated Daily Nutrient Needs:  Energy (kcal):  6377-8548 kcals/day Weight Used for Energy Requirements: Current     Protein (g):  85-95 gm/day Weight Used for Protein Requirements: Current        Fluid (ml/day):  25 mL/kg/d = ~1900 mL/day or per MD Method Used for Fluid Requirements: ml/Kg    Nutrition Related Findings:   Diarrhea noted. Wound Type: None    Current Nutrition Therapies:    ADULT DIET; Regular  ADULT ORAL NUTRITION SUPPLEMENT; Breakfast, Dinner; Clear Liquid Oral Supplement  ADULT ORAL NUTRITION SUPPLEMENT; Lunch, Dinner; Frozen Oral Supplement    Anthropometric Measures:  Height: 180 cm (5' 10.87\")  Current Body Wt: 77.1 kg (169 lb 15.6 oz) (11/21/24)   BMI: 24.4      Nutrition Diagnosis:   Inadequate oral intake related to inadequate protein-energy intake as evidenced by intake 26-50%, diarrhea (poor appetite per pt report)    Nutrition Interventions:   Food and/or Nutrient Delivery: Continue Current Diet, Modify Oral Nutrition Supplement  Nutrition Education/Counseling: Survival skills/brief education completed  Coordination of Nutrition Care: Continue to monitor while inpatient  Plan of Care discussed with: Pt, family, RN    Goals:  Goals: Meet at least 75% of estimated needs, prior to discharge  Type of Goal: Continue current  goal  Previous Goal Met: Progressing toward Goal(s)    Nutrition Monitoring and Evaluation:   Behavioral-Environmental Outcomes: None Identified  Food/Nutrient Intake Outcomes: Food and Nutrient Intake, Supplement Intake  Physical Signs/Symptoms Outcomes: Biochemical Data, Diarrhea, Meal Time Behavior, Weight    Discharge Planning:    Too soon to determine     Janet Starr RD  Contact: [phone]

## 2024-12-03 NOTE — CARE COORDINATION
TRansitional planning    Per RN patient has e coli in blood culture. ID following. May need IV antibiotics at discharge. His platelets need to be above 10 and his hgb has to be above 7 before he is ready for discharge.     Spoke to patient about plan for discharge. Goal is home with wife. He will need to discuss discharge plan with his wife, dtr and son in law when his time for discharge gets closer.

## 2024-12-04 PROBLEM — A41.51 E. COLI SEPTICEMIA (HCC): Status: ACTIVE | Noted: 2024-12-04

## 2024-12-04 PROBLEM — D61.818 PANCYTOPENIA (HCC): Status: ACTIVE | Noted: 2024-12-04

## 2024-12-04 LAB
ABO + RH BLD: NORMAL
ABO/RH: NORMAL
ALBUMIN SERPL-MCNC: 2.9 G/DL (ref 3.5–5.2)
ALBUMIN/GLOB SERPL: 1.2 {RATIO} (ref 1–2.5)
ALP SERPL-CCNC: 42 U/L (ref 40–129)
ALT SERPL-CCNC: <5 U/L (ref 10–50)
ANION GAP SERPL CALCULATED.3IONS-SCNC: 6 MMOL/L (ref 9–16)
ANTIBODY SCREEN: NEGATIVE
ARM BAND NUMBER: NORMAL
ARM BAND NUMBER: NORMAL
AST SERPL-CCNC: 7 U/L (ref 10–50)
BASOPHILS # BLD: 0 K/UL (ref 0–0.2)
BASOPHILS # BLD: 0 K/UL (ref 0–0.2)
BASOPHILS NFR BLD: 0 % (ref 0–2)
BASOPHILS NFR BLD: 0 % (ref 0–2)
BILIRUB SERPL-MCNC: 1.2 MG/DL (ref 0–1.2)
BLOOD BANK BLOOD PRODUCT EXPIRATION DATE: NORMAL
BLOOD BANK DISPENSE STATUS: NORMAL
BLOOD BANK ISBT PRODUCT BLOOD TYPE: 5100
BLOOD BANK ISBT PRODUCT BLOOD TYPE: 7300
BLOOD BANK PRODUCT CODE: NORMAL
BLOOD BANK SAMPLE EXPIRATION: NORMAL
BLOOD BANK SAMPLE EXPIRATION: NORMAL
BLOOD BANK UNIT TYPE AND RH: NORMAL
BLOOD GROUP ANTIBODIES SERPL: NEGATIVE
BPU ID: NORMAL
BUN SERPL-MCNC: 16 MG/DL (ref 8–23)
CALCIUM SERPL-MCNC: 8.1 MG/DL (ref 8.6–10.4)
CELLS COUNTED: 50
CHLORIDE SERPL-SCNC: 109 MMOL/L (ref 98–107)
CO2 SERPL-SCNC: 20 MMOL/L (ref 20–31)
COMPONENT: NORMAL
CREAT SERPL-MCNC: 1 MG/DL (ref 0.7–1.2)
CROSSMATCH RESULT: NORMAL
EOSINOPHIL # BLD: 0 K/UL (ref 0–0.4)
EOSINOPHIL # BLD: 0 K/UL (ref 0–0.4)
EOSINOPHILS RELATIVE PERCENT: 0 % (ref 1–4)
EOSINOPHILS RELATIVE PERCENT: 0 % (ref 1–4)
ERYTHROCYTE [DISTWIDTH] IN BLOOD BY AUTOMATED COUNT: 14.3 % (ref 11.8–14.4)
ERYTHROCYTE [DISTWIDTH] IN BLOOD BY AUTOMATED COUNT: 14.4 % (ref 11.8–14.4)
ERYTHROCYTE [DISTWIDTH] IN BLOOD BY AUTOMATED COUNT: 14.4 % (ref 11.8–14.4)
GFR, ESTIMATED: 82 ML/MIN/1.73M2
GLUCOSE SERPL-MCNC: 94 MG/DL (ref 74–99)
HCT VFR BLD AUTO: 21.8 % (ref 40.7–50.3)
HCT VFR BLD AUTO: 21.8 % (ref 40.7–50.3)
HCT VFR BLD AUTO: 24.6 % (ref 40.7–50.3)
HGB BLD-MCNC: 7 G/DL (ref 13–17)
HGB BLD-MCNC: 7.1 G/DL (ref 13–17)
HGB BLD-MCNC: 7.1 G/DL (ref 13–17)
IMM GRANULOCYTES # BLD AUTO: 0 K/UL (ref 0–0.3)
IMM GRANULOCYTES # BLD AUTO: 0 K/UL (ref 0–0.3)
IMM GRANULOCYTES NFR BLD: 0 %
IMM GRANULOCYTES NFR BLD: 0 %
LYMPHOCYTES NFR BLD: 0.48 K/UL (ref 1–4.8)
LYMPHOCYTES NFR BLD: 0.59 K/UL (ref 1–4.8)
LYMPHOCYTES RELATIVE PERCENT: 98 % (ref 24–44)
LYMPHOCYTES RELATIVE PERCENT: 98 % (ref 24–44)
MCH RBC QN AUTO: 28.9 PG (ref 25.2–33.5)
MCH RBC QN AUTO: 29 PG (ref 25.2–33.5)
MCH RBC QN AUTO: 29.3 PG (ref 25.2–33.5)
MCHC RBC AUTO-ENTMCNC: 28.9 G/DL (ref 28.4–34.8)
MCHC RBC AUTO-ENTMCNC: 32.1 G/DL (ref 28.4–34.8)
MCHC RBC AUTO-ENTMCNC: 32.6 G/DL (ref 28.4–34.8)
MCV RBC AUTO: 100 FL (ref 82.6–102.9)
MCV RBC AUTO: 90.1 FL (ref 82.6–102.9)
MCV RBC AUTO: 90.5 FL (ref 82.6–102.9)
MICROORGANISM SPEC CULT: ABNORMAL
MONOCYTES NFR BLD: 0.01 K/UL (ref 0.1–0.8)
MONOCYTES NFR BLD: 0.01 K/UL (ref 0.1–0.8)
MONOCYTES NFR BLD: 1 % (ref 1–7)
MONOCYTES NFR BLD: 2 % (ref 1–7)
MORPHOLOGY: NORMAL
MORPHOLOGY: NORMAL
NEUTROPHILS NFR BLD: 0 % (ref 36–66)
NEUTROPHILS NFR BLD: 1 % (ref 36–66)
NEUTS SEG NFR BLD: 0 K/UL (ref 1.8–7.7)
NEUTS SEG NFR BLD: 0.01 K/UL (ref 1.8–7.7)
NRBC BLD-RTO: 0 PER 100 WBC
PLATELET # BLD AUTO: ABNORMAL K/UL (ref 138–453)
PLATELET, FLUORESCENCE: 22 K/UL (ref 138–453)
PLATELET, FLUORESCENCE: 4 K/UL (ref 138–453)
PLATELET, FLUORESCENCE: 6 K/UL (ref 138–453)
PLATELETS.RETICULATED NFR BLD AUTO: 1.4 % (ref 1.1–10.3)
PLATELETS.RETICULATED NFR BLD AUTO: 1.9 % (ref 1.1–10.3)
PLATELETS.RETICULATED NFR BLD AUTO: 3.2 % (ref 1.1–10.3)
POTASSIUM SERPL-SCNC: 4.2 MMOL/L (ref 3.7–5.3)
PROT SERPL-MCNC: 5.4 G/DL (ref 6.6–8.7)
RBC # BLD AUTO: 2.41 M/UL (ref 4.21–5.77)
RBC # BLD AUTO: 2.42 M/UL (ref 4.21–5.77)
RBC # BLD AUTO: 2.46 M/UL (ref 4.21–5.77)
SERVICE CMNT-IMP: ABNORMAL
SODIUM SERPL-SCNC: 135 MMOL/L (ref 136–145)
SPECIMEN DESCRIPTION: ABNORMAL
TRANSFUSION STATUS: NORMAL
UNIT DIVISION: 0
UNIT ISSUE DATE/TIME: NORMAL
WBC OTHER # BLD: 0.5 K/UL (ref 3.5–11.3)
WBC OTHER # BLD: 0.5 K/UL (ref 3.5–11.3)
WBC OTHER # BLD: 0.6 K/UL (ref 3.5–11.3)

## 2024-12-04 PROCEDURE — 2580000003 HC RX 258: Performed by: INTERNAL MEDICINE

## 2024-12-04 PROCEDURE — P9073 PLATELETS PHERESIS PATH REDU: HCPCS

## 2024-12-04 PROCEDURE — 80053 COMPREHEN METABOLIC PANEL: CPT

## 2024-12-04 PROCEDURE — 6370000000 HC RX 637 (ALT 250 FOR IP)

## 2024-12-04 PROCEDURE — 86900 BLOOD TYPING SEROLOGIC ABO: CPT

## 2024-12-04 PROCEDURE — 2580000003 HC RX 258: Performed by: NURSE PRACTITIONER

## 2024-12-04 PROCEDURE — 99232 SBSQ HOSP IP/OBS MODERATE 35: CPT | Performed by: INTERNAL MEDICINE

## 2024-12-04 PROCEDURE — 36430 TRANSFUSION BLD/BLD COMPNT: CPT

## 2024-12-04 PROCEDURE — 36415 COLL VENOUS BLD VENIPUNCTURE: CPT

## 2024-12-04 PROCEDURE — 2580000003 HC RX 258

## 2024-12-04 PROCEDURE — 2500000003 HC RX 250 WO HCPCS

## 2024-12-04 PROCEDURE — 99291 CRITICAL CARE FIRST HOUR: CPT | Performed by: INTERNAL MEDICINE

## 2024-12-04 PROCEDURE — 86901 BLOOD TYPING SEROLOGIC RH(D): CPT

## 2024-12-04 PROCEDURE — 2000000000 HC ICU R&B

## 2024-12-04 PROCEDURE — 85055 RETICULATED PLATELET ASSAY: CPT

## 2024-12-04 PROCEDURE — 6360000002 HC RX W HCPCS: Performed by: INTERNAL MEDICINE

## 2024-12-04 PROCEDURE — 86850 RBC ANTIBODY SCREEN: CPT

## 2024-12-04 PROCEDURE — 85027 COMPLETE CBC AUTOMATED: CPT

## 2024-12-04 PROCEDURE — 85025 COMPLETE CBC W/AUTO DIFF WBC: CPT

## 2024-12-04 RX ORDER — SODIUM CHLORIDE 9 MG/ML
INJECTION, SOLUTION INTRAVENOUS PRN
Status: DISCONTINUED | OUTPATIENT
Start: 2024-12-04 | End: 2024-12-05

## 2024-12-04 RX ADMIN — BENZOCAINE: 0.1 GEL TOPICAL at 20:28

## 2024-12-04 RX ADMIN — SODIUM BICARBONATE: 84 INJECTION, SOLUTION INTRAVENOUS at 10:41

## 2024-12-04 RX ADMIN — ACETAMINOPHEN 650 MG: 325 TABLET ORAL at 06:15

## 2024-12-04 RX ADMIN — CEFEPIME 2000 MG: 2 INJECTION, POWDER, FOR SOLUTION INTRAVENOUS at 13:16

## 2024-12-04 RX ADMIN — SODIUM CHLORIDE, PRESERVATIVE FREE 10 ML: 5 INJECTION INTRAVENOUS at 20:26

## 2024-12-04 RX ADMIN — PIPERACILLIN AND TAZOBACTAM 3375 MG: 3; .375 INJECTION, POWDER, LYOPHILIZED, FOR SOLUTION INTRAVENOUS at 04:39

## 2024-12-04 RX ADMIN — SODIUM CHLORIDE, PRESERVATIVE FREE 10 ML: 5 INJECTION INTRAVENOUS at 08:48

## 2024-12-04 RX ADMIN — BENZOCAINE: 0.1 GEL TOPICAL at 16:47

## 2024-12-04 RX ADMIN — SODIUM CHLORIDE, PRESERVATIVE FREE 10 ML: 5 INJECTION INTRAVENOUS at 08:49

## 2024-12-04 ASSESSMENT — PAIN DESCRIPTION - ORIENTATION: ORIENTATION: INNER

## 2024-12-04 ASSESSMENT — PAIN SCALES - GENERAL
PAINLEVEL_OUTOF10: 0
PAINLEVEL_OUTOF10: 1
PAINLEVEL_OUTOF10: 0

## 2024-12-04 ASSESSMENT — PAIN - FUNCTIONAL ASSESSMENT: PAIN_FUNCTIONAL_ASSESSMENT: PREVENTS OR INTERFERES SOME ACTIVE ACTIVITIES AND ADLS

## 2024-12-04 ASSESSMENT — PAIN DESCRIPTION - LOCATION: LOCATION: MOUTH

## 2024-12-04 ASSESSMENT — PAIN DESCRIPTION - DESCRIPTORS: DESCRIPTORS: ACHING;DISCOMFORT

## 2024-12-04 ASSESSMENT — PAIN SCALES - WONG BAKER: WONGBAKER_NUMERICALRESPONSE: NO HURT

## 2024-12-04 NOTE — PLAN OF CARE
Problem: Infection - Adult  Goal: Absence of infection at discharge  Outcome: Progressing  Flowsheets (Taken 12/4/2024 0857)  Absence of infection at discharge: Assess and monitor for signs and symptoms of infection     Problem: Hematologic - Adult  Goal: Maintains hematologic stability  Outcome: Progressing  Flowsheets (Taken 12/4/2024 0857)  Maintains hematologic stability: Assess for signs and symptoms of bleeding or hemorrhage     Problem: Safety - Adult  Goal: Free from fall injury  Outcome: Progressing     Problem: Nutrition Deficit:  Goal: Optimize nutritional status  Outcome: Progressing     Problem: Discharge Planning  Goal: Discharge to home or other facility with appropriate resources  Outcome: Progressing  Flowsheets (Taken 12/4/2024 0857)  Discharge to home or other facility with appropriate resources: Identify barriers to discharge with patient and caregiver     Problem: ABCDS Injury Assessment  Goal: Absence of physical injury  Outcome: Progressing  Flowsheets (Taken 12/4/2024 0905)  Absence of Physical Injury: Implement safety measures based on patient assessment

## 2024-12-04 NOTE — PROGRESS NOTES
Critical Care -Progress Note    Patient's name:  Osbaldo Duff  Medical Record Number: [id number]  Patient's account/billing number: [id number]  Patient's YOB: 1955  Age: 69 y.o.  Date of Admission: 11/21/2024  2:53 PM  Date of History and Physical Examination: 12/4/2024      Primary Care Physician: No primary care provider on file.  Attending Physician: Dr He Laughlin    Code Status: Full Code    Chief complaint:   Chief Complaint   Patient presents with    Abnormal Lab       Today's Evaluation     Subjective Evaluation:  Loose stools settled. Patient had a headache.Having sore lips.  Denies any other symptoms.  No fever    Objective Evaluation:  Hemodynamically stable.  Saturating well on room air.  Received platelet transfusion for critical thrombocytopenia.    Speciality's Recommendations:  ID: Switch AB to cefepime.   Hematology: Monitor CBC.  Transfuse as indicated      Feeding: Diet: ADULT DIET; Regular  ADULT ORAL NUTRITION SUPPLEMENT; Breakfast, Dinner; Clear Liquid Oral Supplement  ADULT ORAL NUTRITION SUPPLEMENT; Lunch, Dinner; Frozen Oral Supplement    Fluids: Half-normal saline with 75 mEq bicarb at 75 mL per    Family:  Updated      Analgesic: acetaminophen    Sedation: none    Thrombo-prophylaxis:  none     Mobility: good     Heads up:  30 Degrees    Ulcer prophylaxis:  [] Protonix [] Pepcid [] Sucralfate  [x] Other: Not indicated    Glycemic control: N/A            HISTORY OF PRESENT ILLNESS:      History was obtained from chart review and the patient.       Osbaldo Duff is a 69 y.o. 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  Patient has been undergoing chemotherapy on the floor, following with heme-onc while inpatient.  Patient receiving cytarabine and Decadron while inpatient.  Overnight, patient had rapid response

## 2024-12-04 NOTE — PROGRESS NOTES
per day Yevgeniy Odell MD   10 mL at 24    sodium chloride flush 0.9 % injection 5-40 mL  5-40 mL IntraVENous PRN Yevgeniy Odell MD        0.9 % sodium chloride infusion   IntraVENous PRN Yevgeniy Odell MD 5 mL/hr at 24 Rate Verify at 24    lidocaine 1 % injection 50 mg  50 mg IntraDERmal Once Yevgeniy Odell MD           Allergies:  Patient has no known allergies.    Social History:   reports that he has never smoked. He has never used smokeless tobacco. He reports that he does not currently use alcohol. He reports that he does not use drugs.     Family History: family history includes Stroke in his father and mother.    REVIEW OF SYSTEMS:    Constitutional: No fever or chills. No night sweats, no weight loss   Eyes: No eye discharge, double vision, or eye pain   HEENT: negative for sore mouth, sore throat, hoarseness and voice change   Respiratory: negative for cough , sputum, dyspnea, wheezing, hemoptysis, chest pain   Cardiovascular: negative for chest pain, dyspnea, palpitations, orthopnea, PND   Gastrointestinal: negative for nausea, vomiting, diarrhea, constipation, abdominal pain, Dysphagia, hematemesis and hematochezia   Genitourinary: negative for frequency, dysuria, nocturia, urinary incontinence, and hematuria   Integument: negative for rash, skin lesions, bruises.   Hematologic/Lymphatic: negative for easy bruising, bleeding, lymphadenopathy, or petechiae   Endocrine: negative for heat or cold intolerance,weight changes, change in bowel habits and hair loss   Musculoskeletal: negative for myalgias, arthralgias, pain, joint swelling,and bone pain   Neurological: negative for headaches, dizziness, seizures, weakness, numbness    PHYSICAL EXAM:      /71   Pulse 72   Temp 98.1 °F (36.7 °C) (Oral)   Resp 16   Ht 1.8 m (5' 10.87\")   Wt 76.3 kg (168 lb 3.2 oz)   SpO2 100%   BMI 23.55 kg/m²    Temp (24hrs), Av.5 °F (36.9 °C), Min:97.6 °F (36.4 °C),  increased blasts and shows no specific immunophenotypic  abnormalities (see flow cytometry addendum).  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  Vitamin B12 and folate levels are within normal limits, as are iron  studies (11/1/2024).  Preliminary findings were discussed with Dr. Jack Gallego via [employer] on 11/14/2024 at 17:41 PM and by phone,  and final findings from [facility] were relayed via  [employer] on 11/21/2024 at 12:49 PM.  Results of myeloid NGS  testing will follow as an addendum once completed.      EVERT Velarde.  **Electronically Signed Out**        sf/11/14/2024  Clinical Information  Pre-Op Diagnosis:  THROMBOCYTOPENIA; LOW HEMOGLOBIN  Operation Performed:  BONE MARROW BIOPSY  se     IMAGING DATA:  XR CHEST PORTABLE   Final Result   1. No acute cardiopulmonary process.   2. Left upper extremity PICC terminates in the lower superior vena cava.             Primary Problem  Acute erythroid leukemia (HCC)    Active Hospital Problems    Diagnosis Date Noted    Agranulocytosis (HCC) [D70.9] 12/03/2024    SVT (supraventricular tachycardia) (HCC) [I47.10] 12/02/2024    Neutropenia with fever (HCC) [D70.9, R50.81] 12/02/2024    Sepsis due to Escherichia coli with acute renal failure (HCC) [A41.51, R65.20, N17.9] 12/02/2024    Myelosuppression after chemotherapy [D75.89, T45.1X5A] 11/23/2024    Chronic kidney disease, stage II (mild) [N18.2] 11/23/2024    Hyperuricemia [E79.0] 11/22/2024    Acute erythroid leukemia (HCC) [C94.00] 11/22/2024    Admission for chemotherapy [Z51.11] 11/22/2024    Acute leukemia not having achieved remission (HCC) [C95.00] 11/21/2024    HTN (hypertension) [I10] 11/21/2024    Thrombocytopenia (HCC) [D69.6]

## 2024-12-04 NOTE — PROGRESS NOTES
[facility] - Progress Note    Today's Date and Time: 12/4/2024, 12:21 PM    Impression :   E. Coli septicemia-detected 12/1/24  Fever  Acute erythroid leukemia  S/p 7-3 chemotherapy that was initiated on 11/20/24  Pancytopenia  Hypotension   HALLE on CKD II    Recommendations:   Discontinue IV Zosyn  Cefepime 2 gm IV q 12 hr for E coli septicemia  Renal dosing    Medical Decision Making/Summary/Discussion:12/4/2024     Elements of Medical Decision Making:  Note: I have independently performed the steps listed below as part of the medical decision making and evaluation.   Examined and discussed with patient.  Septicemia, gram-negative  E. coli septicemia 12/1/2024  Fevers  Acute erythroid leukemia  Status post 7-3 chemotherapy started on 11/20/2024  Pancytopenia  Hypotension  HALLE on CKD 2  Labs, medications, radiologic studies were reviewed with personal review of films  Radiologic studies  Lab work  Cultures  E. coli septicemia  Large amounts of data were reviewed  Please see history of present illness below  Discussed with nursing Staff, Discharge planner  Critical care residents  Infection Control and Prevention measures reviewed  Universal precaution  Contact isolation  All prior entries were reviewed  Administer medications as ordered  Zosyn pending further data  Adjusted for renal insufficiency  Prognosis:   Guarded  Discharge planning reviewed  Follow up as outpatient.      Maicol Puckett MD     Infection Control Recommendations   [place] Precautions  Neutropenic precautions    Antimicrobial Stewardship Recommendations     Simplification of therapy  Targeted therapy  Renal dosing    Coordination of Outpatient Care:   Estimated Length of IV antimicrobials:TBD  Patient will need Midline Catheter Insertion: TBD  Patient will need PICC line Insertion:TBD  Patient will need: Home IV , Infusion Center,  SNF,  LTAC: TBD  Patient will need outpatient wound care:     Chief

## 2024-12-04 NOTE — PLAN OF CARE
Problem: Infection - Adult  Goal: Absence of infection at discharge  Outcome: Progressing     Problem: Hematologic - Adult  Goal: Maintains hematologic stability  Outcome: Progressing     Problem: Safety - Adult  Goal: Free from fall injury  Outcome: Progressing     Problem: Nutrition Deficit:  Goal: Optimize nutritional status  12/3/2024 2246 by Erika Hou RN  Outcome: Progressing     Problem: Discharge Planning  Goal: Discharge to home or other facility with appropriate resources  Outcome: Progressing     Problem: ABCDS Injury Assessment  Goal: Absence of physical injury  Outcome: Progressing

## 2024-12-05 LAB
ALBUMIN SERPL-MCNC: 3.2 G/DL (ref 3.5–5.2)
ALBUMIN/GLOB SERPL: 1.2 {RATIO} (ref 1–2.5)
ALP SERPL-CCNC: 51 U/L (ref 40–129)
ALT SERPL-CCNC: 10 U/L (ref 10–50)
ANION GAP SERPL CALCULATED.3IONS-SCNC: 10 MMOL/L (ref 9–16)
AST SERPL-CCNC: 9 U/L (ref 10–50)
ATYPICAL LYMPHOCYTE ABSOLUTE COUNT: 0.01 K/UL
ATYPICAL LYMPHOCYTES: 1 %
BASOPHILS # BLD: 0 K/UL (ref 0–0.2)
BASOPHILS NFR BLD: 0 % (ref 0–2)
BILIRUB SERPL-MCNC: 0.8 MG/DL (ref 0–1.2)
BLOOD BANK BLOOD PRODUCT EXPIRATION DATE: NORMAL
BLOOD BANK DISPENSE STATUS: NORMAL
BLOOD BANK ISBT PRODUCT BLOOD TYPE: 6200
BLOOD BANK ISBT PRODUCT BLOOD TYPE: 7300
BLOOD BANK ISBT PRODUCT BLOOD TYPE: 7300
BLOOD BANK PRODUCT CODE: NORMAL
BLOOD BANK UNIT TYPE AND RH: NORMAL
BPU ID: NORMAL
BUN SERPL-MCNC: 16 MG/DL (ref 8–23)
CALCIUM SERPL-MCNC: 8.1 MG/DL (ref 8.6–10.4)
CHLORIDE SERPL-SCNC: 107 MMOL/L (ref 98–107)
CO2 SERPL-SCNC: 18 MMOL/L (ref 20–31)
COMPONENT: NORMAL
CREAT SERPL-MCNC: 1 MG/DL (ref 0.7–1.2)
EOSINOPHIL # BLD: 0 K/UL (ref 0–0.4)
EOSINOPHILS RELATIVE PERCENT: 0 % (ref 1–4)
ERYTHROCYTE [DISTWIDTH] IN BLOOD BY AUTOMATED COUNT: 14.2 % (ref 11.8–14.4)
GFR, ESTIMATED: 82 ML/MIN/1.73M2
GLUCOSE SERPL-MCNC: 96 MG/DL (ref 74–99)
HCT VFR BLD AUTO: 22.3 % (ref 40.7–50.3)
HGB BLD-MCNC: 7.2 G/DL (ref 13–17)
IMM GRANULOCYTES # BLD AUTO: 0 K/UL (ref 0–0.3)
IMM GRANULOCYTES NFR BLD: 0 %
LYMPHOCYTES NFR BLD: 0.78 K/UL (ref 1–4.8)
LYMPHOCYTES RELATIVE PERCENT: 98 % (ref 24–44)
MCH RBC QN AUTO: 29.3 PG (ref 25.2–33.5)
MCHC RBC AUTO-ENTMCNC: 32.3 G/DL (ref 28.4–34.8)
MCV RBC AUTO: 90.7 FL (ref 82.6–102.9)
MONOCYTES NFR BLD: 0 % (ref 1–7)
MONOCYTES NFR BLD: 0 K/UL (ref 0.1–0.8)
MORPHOLOGY: NORMAL
NEUTROPHILS NFR BLD: 1 % (ref 36–66)
NEUTS SEG NFR BLD: 0.01 K/UL (ref 1.8–7.7)
NRBC BLD-RTO: 0 PER 100 WBC
PLATELET # BLD AUTO: ABNORMAL K/UL (ref 138–453)
PLATELET, FLUORESCENCE: 18 K/UL (ref 138–453)
PLATELETS.RETICULATED NFR BLD AUTO: 1.9 % (ref 1.1–10.3)
POTASSIUM SERPL-SCNC: 4.1 MMOL/L (ref 3.7–5.3)
PROT SERPL-MCNC: 5.9 G/DL (ref 6.6–8.7)
RBC # BLD AUTO: 2.46 M/UL (ref 4.21–5.77)
SODIUM SERPL-SCNC: 135 MMOL/L (ref 136–145)
TRANSFUSION STATUS: NORMAL
UNIT DIVISION: 0
UNIT ISSUE DATE/TIME: NORMAL
WBC OTHER # BLD: 0.8 K/UL (ref 3.5–11.3)

## 2024-12-05 PROCEDURE — 2580000003 HC RX 258: Performed by: NURSE PRACTITIONER

## 2024-12-05 PROCEDURE — 6360000002 HC RX W HCPCS: Performed by: INTERNAL MEDICINE

## 2024-12-05 PROCEDURE — 99232 SBSQ HOSP IP/OBS MODERATE 35: CPT | Performed by: INTERNAL MEDICINE

## 2024-12-05 PROCEDURE — 80053 COMPREHEN METABOLIC PANEL: CPT

## 2024-12-05 PROCEDURE — 2000000000 HC ICU R&B

## 2024-12-05 PROCEDURE — 85055 RETICULATED PLATELET ASSAY: CPT

## 2024-12-05 PROCEDURE — 85025 COMPLETE CBC W/AUTO DIFF WBC: CPT

## 2024-12-05 PROCEDURE — 2580000003 HC RX 258: Performed by: INTERNAL MEDICINE

## 2024-12-05 PROCEDURE — 99291 CRITICAL CARE FIRST HOUR: CPT | Performed by: INTERNAL MEDICINE

## 2024-12-05 PROCEDURE — 36415 COLL VENOUS BLD VENIPUNCTURE: CPT

## 2024-12-05 RX ADMIN — SODIUM CHLORIDE: 9 INJECTION, SOLUTION INTRAVENOUS at 00:50

## 2024-12-05 RX ADMIN — SODIUM CHLORIDE, PRESERVATIVE FREE 10 ML: 5 INJECTION INTRAVENOUS at 08:19

## 2024-12-05 RX ADMIN — BENZOCAINE: 0.1 GEL TOPICAL at 06:10

## 2024-12-05 RX ADMIN — CEFEPIME 2000 MG: 2 INJECTION, POWDER, FOR SOLUTION INTRAVENOUS at 14:04

## 2024-12-05 RX ADMIN — CEFEPIME 2000 MG: 2 INJECTION, POWDER, FOR SOLUTION INTRAVENOUS at 00:51

## 2024-12-05 RX ADMIN — SODIUM CHLORIDE, PRESERVATIVE FREE 10 ML: 5 INJECTION INTRAVENOUS at 19:53

## 2024-12-05 RX ADMIN — SODIUM CHLORIDE, PRESERVATIVE FREE 10 ML: 5 INJECTION INTRAVENOUS at 19:52

## 2024-12-05 ASSESSMENT — PAIN SCALES - GENERAL
PAINLEVEL_OUTOF10: 0

## 2024-12-05 NOTE — PROGRESS NOTES
[facility] - Progress Note    Today's Date and Time: 12/5/2024, 6:49 AM    Impression :   E. Coli septicemia-detected 12/1/24  Fever  Acute erythroid leukemia  S/p 7-3 chemotherapy that was initiated on 11/20/24  Pancytopenia  Hypotension   HALLE on CKD II    Recommendations:   Discontinued IV Zosyn on 12-4-24  Cefepime 2 gm IV q 12 hr for E coli septicemia. Tentative stop date 12-11-24  Renal dosing  Monitor WBC    Medical Decision Making/Summary/Discussion:12/5/2024     Elements of Medical Decision Making:  Note: I have independently performed the steps listed below as part of the medical decision making and evaluation.   Examined and discussed with patient.  Septicemia, gram-negative  E. coli septicemia 12/1/2024  Fevers  Acute erythroid leukemia  Status post 7-3 chemotherapy started on 11/20/2024  Pancytopenia  Hypotension  HALLE on CKD 2  Labs, medications, radiologic studies were reviewed with personal review of films  Radiologic studies  Lab work: Pancytopenia  Cultures: E. coli septicemia  Large amounts of data were reviewed  Please see history of present illness  and daily progress note below  Discussed with nursing Staff, Discharge planner  Dr Kate's team. Critical care residents  Infection Control and Prevention measures reviewed  Universal precaution  Contact isolation  All prior entries were reviewed  Administer medications as ordered  Zosyn D/C  On cefepime  Prognosis:   Guarded  Discharge planning reviewed  Follow up as outpatient.      Maicol Puckett MD     Infection Control Recommendations   [place] Precautions  Neutropenic precautions    Antimicrobial Stewardship Recommendations     Simplification of therapy  Targeted therapy  Renal dosing    Coordination of Outpatient Care:   Estimated Length of IV antimicrobials:TBD  Patient will need Midline Catheter Insertion: TBD  Patient will need PICC line Insertion:TBD  Patient will need: Home IV , Infusion  --   12/05/24 0430 134/76 -- -- 72 -- 96 % --   12/05/24 0415 -- -- -- 70 -- 99 % --   12/05/24 0400 134/76 98 °F (36.7 °C) Oral 71 14 99 % --   12/05/24 0345 -- -- -- 68 -- 100 % --   12/05/24 0330 -- -- -- 97 -- 97 % --   12/05/24 0315 -- -- -- 76 -- 98 % --   12/05/24 0300 136/74 -- -- 71 -- 98 % --   12/05/24 0245 -- -- -- 93 -- -- --   12/05/24 0230 -- -- -- 68 -- 100 % --   12/05/24 0215 -- -- -- 72 -- 100 % --   12/05/24 0200 120/77 -- -- 79 -- 98 % --   12/05/24 0145 -- -- -- 81 -- 99 % --   12/05/24 0130 -- -- -- 74 -- 100 % --   12/05/24 0115 -- -- -- 74 -- 100 % --   12/05/24 0100 -- -- -- 94 -- -- --   12/05/24 0045 -- -- -- 69 -- 100 % --   12/05/24 0030 -- -- -- 74 -- 100 % --   12/05/24 0015 -- -- -- 77 -- 100 % --   12/05/24 0000 136/77 98 °F (36.7 °C) Oral 73 14 100 % --   12/04/24 2345 -- -- -- 69 -- 99 % --   12/04/24 2330 -- -- -- 74 -- 100 % --   12/04/24 2315 -- -- -- 78 -- 100 % --   12/04/24 2300 132/83 -- -- 71 -- 100 % --     General Appearance: Awake, alert, and in no apparent distress  Head:  Normocephalic, no trauma  Eyes: Pupils equal, round, reactive to light and accommodation; extraocular movements intact; sclera anicteric; conjunctivae pink. No embolic phenomena.  ENT: Oropharynx clear, without erythema, exudate, or thrush. No tenderness of sinuses. Mouth/throat: mucosa pink and moist. No lesions. Dentition in good repair.  Neck:Supple, without lymphadenopathy. Thyroid normal, No bruits.  Pulmonary/Chest: Clear to auscultation, without wheezes, rales, or rhonchi.  No dullness to percussion.   Cardiovascular: Regular rate and rhythm without murmurs, rubs, or gallops.   Abdomen: Soft, non tender. Bowel sounds normal. No organomegaly  All four Extremities: No cyanosis, clubbing, edema, or effusions.  Neurologic: No gross sensory or motor deficits.  Skin: Warm and dry with good turgor.No signs of peripheral arterial or venous insufficiency. No ulcerations. No open wounds.      I have

## 2024-12-05 NOTE — PROGRESS NOTES
Today's Date: 12/4/2024  Patient Name: Osbaldo Duff  Date of admission: 11/21/2024  2:53 PM  Patient's age: 69 y.o., 1955  Admission Dx: Acute leukemia, in relapse (HCC) [C95.02]  Acute leukemia not having achieved remission (HCC) [C95.00]    Reason for Consult: acute lukemia, sent from office   Requesting Physician: No admitting provider for patient encounter.    CHIEF COMPLAINT:    Chief Complaint   Patient presents with    Abnormal Lab       History Obtained From:  patient and chart  INTERVAL HISTORY:      Interval history:    Patient seen and examined  Labs vitals reviewed  Hemoglobin 8.9 today.  WBC count 0.4.  Patient seen and treated transfusion.  Day #12.  Remains on broad-spectrum antibiotics.  Patient walking in his room.  Trying to stay active      HISTORY OF PRESENT ILLNESS:    Osbaldo Duff is a 69 y.o. male who is admitted to the hospital on 11/21/2024  for anemia, low hb and acute leukemia diagnosis    Patient was following with Dr. Gallego as outpatient for possible suspected bone marrow pathology including MDS/leukemia.    Bone marrow biopsy was performed on 11/11/2024 and was unclear diagnosis here therefore was sent to [facility] which confirmed the diagnosis of acute erythroid leukemia.    Patient has pancytopenia.  Patient is now sent to ER for further management and likely administration of induction chemotherapy.    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  History was obtained through an .  Patient understand very limited English.  Patient has not seen a doctor for a while.  Was seen by primary care  not having achieved remission (HCC) [C95.00] 11/21/2024    HTN (hypertension) [I10] 11/21/2024    Thrombocytopenia (HCC) [D69.6] 11/01/2024    Acute anemia [D64.9] 11/01/2024     IMPRESSION:   Acute erythroid leukemia  Anemia  Thrombocytopenia    RECOMMENDATIONS:  I reviewed the laboratory, imaging studies, prior records, outside records, discussed possible diagnosis and other treatment recommendations   I discussed the bone marrow biopsy results and diagnosis, prognosis and treatment recommendations with patient  Day #12.  Plan for bone marrow biopsy between day 14-21 discussed with patient is agreeable.  Antibiotics per infectious disease team  Check CBC daily.  Neutropenic precautions  Transfuse PRBC if hemoglobin less than 7 and transfuse platelets if less than 10K as per leukemia treatment protocol  Monitor bone marrow recovery.  Plan for bone marrow biopsy around day #14-21  Patient remains seriously ill  Will follow while in house      Discussed with patient and family and Nurse.        CELIA HONG MD                     This note is created with the assistance of a speech recognition program.  While intending to generate a document that actually reflects the content of the visit, the document can still have some errors including those of syntax and sound a like substitutions which may escape proof reading.  It such instances, actual meaning can be extrapolated by contextual diversion.

## 2024-12-05 NOTE — PROGRESS NOTES
Attending Physician Statement  I have discussed the care of Osbaldo Duff, including pertinent history and exam findings,  with the resident. I have seen and examined the patient and the key elements of all parts of the encounter have been performed by me.  I agree with the assessment, plan and orders as documented by the resident with additions .  Principal Problem:    Acute erythroid leukemia (HCC)  Active Problems:    Acute anemia    Thrombocytopenia (HCC)    Acute leukemia not having achieved remission (HCC)    HTN (hypertension)    Hyperuricemia    Admission for chemotherapy    Myelosuppression after chemotherapy    Chronic kidney disease, stage II (mild)    SVT (supraventricular tachycardia) (HCC)    Neutropenia with fever (HCC)    Sepsis due to Escherichia coli with acute renal failure (HCC)    Agranulocytosis (HCC)    Pancytopenia (HCC)    E. coli septicemia (HCC)  Resolved Problems:    * No resolved hospital problems. *       Total critical care time caring for this patient with life threatening, unstable organ failure, including direct patient contact, management of life support systems, review of data including imaging and labs, discussions with other team members and physicians at least 30   Min so far today, excluding procedures.       Electronically signed by TAMAR VAZ MD on   12/5/24 at 2:39 PM EST    Please note that this chart was generated using voice recognition Dragon dictation software.  Although every effort was made to ensure the accuracy of this automated transcription, some errors in transcription may have occurred.

## 2024-12-05 NOTE — CARE COORDINATION
Transitional planning. Spoke to pt and pt's wife in room. Plans remain for pt to go home w/ family, wife will transport, has established PCP. Agreeable to HC if needed. HD list provided.

## 2024-12-05 NOTE — PLAN OF CARE
Problem: Infection - Adult  Goal: Absence of infection at discharge  Outcome: Progressing     Problem: Hematologic - Adult  Goal: Maintains hematologic stability  Outcome: Progressing     Problem: Safety - Adult  Goal: Free from fall injury  Outcome: Progressing     Problem: Nutrition Deficit:  Goal: Optimize nutritional status  Outcome: Progressing     Problem: Discharge Planning  Goal: Discharge to home or other facility with appropriate resources  Outcome: Progressing     Problem: ABCDS Injury Assessment  Goal: Absence of physical injury  Outcome: Progressing

## 2024-12-05 NOTE — PLAN OF CARE
Problem: Infection - Adult  Goal: Absence of infection at discharge  12/5/2024 0146 by Erika Hou RN  Outcome: Progressing  Problem: Hematologic - Adult  Goal: Maintains hematologic stability  12/5/2024 0146 by Erika Hou RN  Outcome: Progressing     Problem: Safety - Adult  Goal: Free from fall injury  12/5/2024 0146 by Erika Hou RN  Outcome: Progressing  Problem: Nutrition Deficit:  Goal: Optimize nutritional status  12/5/2024 0146 by Erika Hou RN  Outcome: Progressing    Problem: Discharge Planning  Goal: Discharge to home or other facility with appropriate resources  12/5/2024 0146 by Erika Hou RN  Outcome: Progressing     Problem: ABCDS Injury Assessment  Goal: Absence of physical injury  12/5/2024 0146 by Erika Hou RN  Outcome: Progressing

## 2024-12-05 NOTE — PROGRESS NOTES
Critical Care -Progress Note    Patient's name:  Osbaldo Duff  Medical Record Number: [id number]  Patient's account/billing number: [id number]  Patient's YOB: 1955  Age: 69 y.o.  Date of Admission: 11/21/2024  2:53 PM  Date of History and Physical Examination: 12/5/2024      Primary Care Physician: No primary care provider on file.  Attending Physician: Dr He Laughlin    Code Status: Full Code    Chief complaint:   Chief Complaint   Patient presents with    Abnormal Lab       Today's Evaluation     Subjective Evaluation:  Loose stools settled. Patient had a headache.Having sore lips.  Denies any other symptoms.  No fever    Objective Evaluation:  Hemodynamically stable.  Saturating well on room air.  Received platelet transfusion for critical thrombocytopenia.    Speciality's Recommendations:  ID: Switch AB to cefepime.   Hematology: Monitor CBC.  Transfuse as indicated      Feeding: Diet: ADULT DIET; Regular  ADULT ORAL NUTRITION SUPPLEMENT; Breakfast, Dinner; Clear Liquid Oral Supplement  ADULT ORAL NUTRITION SUPPLEMENT; Lunch, Dinner; Frozen Oral Supplement    Fluids: Half-normal saline with 75 mEq bicarb at 75 mL per    Family:  Updated      Analgesic: acetaminophen    Sedation: none    Thrombo-prophylaxis:  none     Mobility: good     Heads up:  30 Degrees    Ulcer prophylaxis:  [] Protonix [] Pepcid [] Sucralfate  [x] Other: Not indicated    Glycemic control: N/A            HISTORY OF PRESENT ILLNESS:      History was obtained from chart review and the patient.       Osbaldo Duff is a 69 y.o. 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  Patient has been undergoing chemotherapy on the floor, following with heme-onc while inpatient.  Patient receiving cytarabine and Decadron while inpatient.  Overnight, patient had rapid response    12/05/24 1000 123/80 -- -- 82 -- 100 %   12/05/24 0900 131/75 -- -- 77 -- 100 %   12/05/24 0800 (!) 153/76 98.1 °F (36.7 °C) Oral 68 18 100 %         Intake/Output Summary (Last 24 hours) at 12/5/2024 1530  Last data filed at 12/5/2024 0800  Gross per 24 hour   Intake 1027.94 ml   Output 750 ml   Net 277.94 ml     Date 12/05/24 0000 - 12/05/24 2359   Shift 6339-6248 8139-7674 7847-2459 24 Hour Total   INTAKE   P.O.(mL/kg/hr)  300  300   Shift Total(mL/kg)  300(3.6)  300(3.6)   OUTPUT   Urine(mL/kg/hr) 400(0.6)   400   Shift Total(mL/kg) 400(4.8)   400(4.8)   Weight (kg) 82.5 82.5 82.5 82.5     Wt Readings from Last 3 Encounters:   12/05/24 82.5 kg (181 lb 14.1 oz)   11/04/24 80.5 kg (177 lb 8 oz)   11/01/24 81.9 kg (180 lb 8.9 oz)     Body mass index is 25.46 kg/m².        PHYSICAL EXAM:   Constitutional: No acute distress  HEENT: NC/AT, PERRL, no congestion or rhinorrhea  Neck: Supple, symmetrical, trachea midline, no adenopathy,  no JVD  Respiratory: Clear to auscultation, no wheezes, rales, rhonchi  Cardiovascular: Regular rate and rhythm, normal S1, S2, no murmur   Abdomen: Soft, nontender, nondistended, no masses or organomegaly  Extremities:   No pedal edema, no clubbing or cyanosis  Neuro: A&O x4, moving all four extremities, strength and sensation intact    MEDICATIONS:  Scheduled Meds:   cefepime  2,000 mg IntraVENous Q12H    sodium chloride  1,000 mL IntraVENous Once    sodium chloride flush  5-40 mL IntraVENous 2 times per day    sodium chloride flush  5-40 mL IntraVENous 2 times per day    lidocaine 1 % injection  50 mg IntraDERmal Once     Continuous Infusions:   norepinephrine Stopped (12/02/24 1017)    sodium chloride      sodium chloride 5 mL/hr at 12/05/24 0050     PRN Meds:   benzocaine, , PRN  acetaminophen, 650 mg, Q6H PRN  midodrine, 10 mg, TID PRN  ibuprofen, 400 mg, Q6H PRN  promethazine, 12.5 mg, Q6H PRN  LORazepam, 0.5 mg, Q6H PRN  sodium chloride flush, 5-40 mL, PRN  sodium chloride, ,

## 2024-12-06 LAB
ALBUMIN SERPL-MCNC: 3.4 G/DL (ref 3.5–5.2)
ALBUMIN/GLOB SERPL: 1.2 {RATIO} (ref 1–2.5)
ALP SERPL-CCNC: 70 U/L (ref 40–129)
ALT SERPL-CCNC: 21 U/L (ref 10–50)
ANION GAP SERPL CALCULATED.3IONS-SCNC: 13 MMOL/L (ref 9–16)
AST SERPL-CCNC: 12 U/L (ref 10–50)
BASOPHILS # BLD: 0 K/UL (ref 0–0.2)
BASOPHILS NFR BLD: 0 % (ref 0–2)
BILIRUB SERPL-MCNC: 0.6 MG/DL (ref 0–1.2)
BUN SERPL-MCNC: 14 MG/DL (ref 8–23)
CALCIUM SERPL-MCNC: 8.4 MG/DL (ref 8.6–10.4)
CELLS COUNTED: 50
CHLORIDE SERPL-SCNC: 108 MMOL/L (ref 98–107)
CO2 SERPL-SCNC: 18 MMOL/L (ref 20–31)
CREAT SERPL-MCNC: 1 MG/DL (ref 0.7–1.2)
EKG ATRIAL RATE: 77 BPM
EKG P AXIS: 36 DEGREES
EKG P-R INTERVAL: 158 MS
EKG Q-T INTERVAL: 376 MS
EKG QRS DURATION: 82 MS
EKG QTC CALCULATION (BAZETT): 425 MS
EKG R AXIS: 27 DEGREES
EKG T AXIS: 62 DEGREES
EKG VENTRICULAR RATE: 77 BPM
EOSINOPHIL # BLD: 0 K/UL (ref 0–0.4)
EOSINOPHILS RELATIVE PERCENT: 0 % (ref 1–4)
ERYTHROCYTE [DISTWIDTH] IN BLOOD BY AUTOMATED COUNT: 13.5 % (ref 11.8–14.4)
GFR, ESTIMATED: 82 ML/MIN/1.73M2
GLUCOSE SERPL-MCNC: 118 MG/DL (ref 74–99)
HCT VFR BLD AUTO: 22.4 % (ref 40.7–50.3)
HGB BLD-MCNC: 7.4 G/DL (ref 13–17)
IMM GRANULOCYTES # BLD AUTO: 0 K/UL (ref 0–0.3)
IMM GRANULOCYTES NFR BLD: 0 %
LYMPHOCYTES NFR BLD: 0.59 K/UL (ref 1–4.8)
LYMPHOCYTES RELATIVE PERCENT: 98 % (ref 24–44)
MCH RBC QN AUTO: 29.6 PG (ref 25.2–33.5)
MCHC RBC AUTO-ENTMCNC: 33 G/DL (ref 28.4–34.8)
MCV RBC AUTO: 89.6 FL (ref 82.6–102.9)
MICROORGANISM SPEC CULT: NORMAL
MONOCYTES NFR BLD: 0.01 K/UL (ref 0.1–0.8)
MONOCYTES NFR BLD: 2 % (ref 1–7)
MORPHOLOGY: NORMAL
NEUTROPHILS NFR BLD: 0 % (ref 36–66)
NEUTS SEG NFR BLD: 0 K/UL (ref 1.8–7.7)
NRBC BLD-RTO: 0 PER 100 WBC
PLATELET # BLD AUTO: ABNORMAL K/UL (ref 138–453)
PLATELET, FLUORESCENCE: 8 K/UL (ref 138–453)
PLATELETS.RETICULATED NFR BLD AUTO: 0.8 % (ref 1.1–10.3)
POTASSIUM SERPL-SCNC: 3.9 MMOL/L (ref 3.7–5.3)
PROT SERPL-MCNC: 6.2 G/DL (ref 6.6–8.7)
RBC # BLD AUTO: 2.5 M/UL (ref 4.21–5.77)
SERVICE CMNT-IMP: NORMAL
SODIUM SERPL-SCNC: 139 MMOL/L (ref 136–145)
SPECIMEN DESCRIPTION: NORMAL
WBC OTHER # BLD: 0.6 K/UL (ref 3.5–11.3)

## 2024-12-06 PROCEDURE — 99232 SBSQ HOSP IP/OBS MODERATE 35: CPT | Performed by: INTERNAL MEDICINE

## 2024-12-06 PROCEDURE — 36415 COLL VENOUS BLD VENIPUNCTURE: CPT

## 2024-12-06 PROCEDURE — 2580000003 HC RX 258: Performed by: NURSE PRACTITIONER

## 2024-12-06 PROCEDURE — 2580000003 HC RX 258: Performed by: INTERNAL MEDICINE

## 2024-12-06 PROCEDURE — 6360000002 HC RX W HCPCS: Performed by: INTERNAL MEDICINE

## 2024-12-06 PROCEDURE — 6370000000 HC RX 637 (ALT 250 FOR IP): Performed by: NURSE PRACTITIONER

## 2024-12-06 PROCEDURE — 99291 CRITICAL CARE FIRST HOUR: CPT | Performed by: INTERNAL MEDICINE

## 2024-12-06 PROCEDURE — 93005 ELECTROCARDIOGRAM TRACING: CPT | Performed by: INTERNAL MEDICINE

## 2024-12-06 PROCEDURE — 2060000000 HC ICU INTERMEDIATE R&B

## 2024-12-06 PROCEDURE — 85055 RETICULATED PLATELET ASSAY: CPT

## 2024-12-06 PROCEDURE — 85025 COMPLETE CBC W/AUTO DIFF WBC: CPT

## 2024-12-06 PROCEDURE — 6370000000 HC RX 637 (ALT 250 FOR IP)

## 2024-12-06 PROCEDURE — 99232 SBSQ HOSP IP/OBS MODERATE 35: CPT | Performed by: NURSE PRACTITIONER

## 2024-12-06 PROCEDURE — 80053 COMPREHEN METABOLIC PANEL: CPT

## 2024-12-06 RX ORDER — SODIUM CHLORIDE 9 MG/ML
INJECTION, SOLUTION INTRAVENOUS PRN
Status: DISCONTINUED | OUTPATIENT
Start: 2024-12-06 | End: 2024-12-11

## 2024-12-06 RX ORDER — METOPROLOL SUCCINATE 25 MG/1
25 TABLET, EXTENDED RELEASE ORAL DAILY
Status: DISCONTINUED | OUTPATIENT
Start: 2024-12-06 | End: 2024-12-18 | Stop reason: HOSPADM

## 2024-12-06 RX ORDER — MULTIVITAMIN WITH IRON
1 TABLET ORAL DAILY
Status: DISCONTINUED | OUTPATIENT
Start: 2024-12-06 | End: 2024-12-18 | Stop reason: HOSPADM

## 2024-12-06 RX ADMIN — SODIUM CHLORIDE, PRESERVATIVE FREE 10 ML: 5 INJECTION INTRAVENOUS at 09:35

## 2024-12-06 RX ADMIN — THERA TABS 1 TABLET: TAB at 14:31

## 2024-12-06 RX ADMIN — SODIUM CHLORIDE, PRESERVATIVE FREE 10 ML: 5 INJECTION INTRAVENOUS at 21:17

## 2024-12-06 RX ADMIN — CEFEPIME 2000 MG: 2 INJECTION, POWDER, FOR SOLUTION INTRAVENOUS at 00:34

## 2024-12-06 RX ADMIN — CEFEPIME 2000 MG: 2 INJECTION, POWDER, FOR SOLUTION INTRAVENOUS at 16:25

## 2024-12-06 RX ADMIN — ACETAMINOPHEN 650 MG: 325 TABLET ORAL at 06:48

## 2024-12-06 RX ADMIN — SODIUM CHLORIDE, PRESERVATIVE FREE 10 ML: 5 INJECTION INTRAVENOUS at 09:36

## 2024-12-06 RX ADMIN — METOPROLOL SUCCINATE 25 MG: 25 TABLET, EXTENDED RELEASE ORAL at 14:31

## 2024-12-06 RX ADMIN — ACETAMINOPHEN 650 MG: 325 TABLET ORAL at 14:28

## 2024-12-06 ASSESSMENT — PAIN DESCRIPTION - LOCATION
LOCATION: HEAD
LOCATION: HEAD

## 2024-12-06 ASSESSMENT — PAIN SCALES - GENERAL
PAINLEVEL_OUTOF10: 3
PAINLEVEL_OUTOF10: 0
PAINLEVEL_OUTOF10: 2
PAINLEVEL_OUTOF10: 0

## 2024-12-06 ASSESSMENT — PAIN - FUNCTIONAL ASSESSMENT: PAIN_FUNCTIONAL_ASSESSMENT: ACTIVITIES ARE NOT PREVENTED

## 2024-12-06 ASSESSMENT — PAIN DESCRIPTION - ORIENTATION
ORIENTATION: ANTERIOR
ORIENTATION: ANTERIOR

## 2024-12-06 ASSESSMENT — PAIN DESCRIPTION - DESCRIPTORS
DESCRIPTORS: ACHING
DESCRIPTORS: ACHING

## 2024-12-06 NOTE — PROGRESS NOTES
mL IntraVENous Once Carol Wyatt MD        ibuprofen (ADVIL;MOTRIN) tablet 400 mg  400 mg Oral Q6H PRN Carol Wyatt MD        promethazine (PHENERGAN) injection 12.5 mg  12.5 mg IntraMUSCular Q6H PRN Carol Wyatt MD        LORazepam (ATIVAN) injection 0.5 mg  0.5 mg IntraVENous Q6H PRN Jack Gallego MD        sodium chloride flush 0.9 % injection 5-40 mL  5-40 mL IntraVENous 2 times per day MiMandy del cid, APRN - NP   10 mL at 12/06/24 0936    sodium chloride flush 0.9 % injection 5-40 mL  5-40 mL IntraVENous PRN Minehemias, Mandy, APRN - NP   10 mL at 11/28/24 1810    0.9 % sodium chloride infusion   IntraVENous PRN Miester, Mandy, APRN - NP        potassium chloride (KLOR-CON M) extended release tablet 40 mEq  40 mEq Oral PRN Minehemias, Mandy, APRN - NP        Or    potassium bicarb-citric acid (EFFER-K) effervescent tablet 40 mEq  40 mEq Oral PRN Miester, Mandy, APRN - NP        Or    potassium chloride 10 mEq/100 mL IVPB (Peripheral Line)  10 mEq IntraVENous PRN Miester, Mandy, APRN - NP        ondansetron (ZOFRAN-ODT) disintegrating tablet 4 mg  4 mg Oral Q8H PRN Minehemias, Mandy, APRN - NP        Or    ondansetron (ZOFRAN) injection 4 mg  4 mg IntraVENous Q6H PRN Franck, Mandy, APRN - NP   4 mg at 12/01/24 8937    polyethylene glycol (GLYCOLAX) packet 17 g  17 g Oral Daily PRN Franck, Mandy, APRN - NP        bisacodyl (DULCOLAX) suppository 10 mg  10 mg Rectal Daily PRN Franck, Mandy, APRN - NP        sodium chloride flush 0.9 % injection 5-40 mL  5-40 mL IntraVENous 2 times per day Yevgeniy Odell MD   10 mL at 12/06/24 0935    sodium chloride flush 0.9 % injection 5-40 mL  5-40 mL IntraVENous PRN Yevgeniy Odell MD        0.9 % sodium chloride infusion   IntraVENous PRN Yevgeniy Odell MD   Stopped at 12/06/24 0443    lidocaine 1 % injection 50 mg  50 mg IntraDERmal Once Yevgeniy Odell MD           Allergies:  Patient has no known allergies.    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  Overall high risk category  Plan for bone marrow biopsy between day 14-21 discussed with patient is agreeable.  Antibiotics per infectious disease team  Check CBC daily.  Neutropenic precautions  Transfuse PRBC if hemoglobin less than 7 and transfuse platelets if less than 10K as per leukemia treatment protocol  Monitor bone marrow recovery.  Plan for bone marrow biopsy around day #14-21  Patient remains seriously ill  Will follow while in house      Discussed with patient and family and Nurse.        CELIA HONG MD                     This note is created with the assistance of a speech recognition program.  While intending to generate a document that actually reflects the content of the visit, the document can still have some errors including those of syntax and sound a like substitutions which may escape proof reading.  It such instances, actual meaning can be extrapolated by contextual diversion.

## 2024-12-06 NOTE — PROGRESS NOTES
[facility]  Office: [phone]  Carlos Ramírez DO, Jerry Washington DO, Keo Esquivel DO, Matthieu Steven DO, Janet Barr MD, Ivy Rome MD, Kimberlyn Jenkins MD, Deborah Dunbar MD,  Nick Garcia MD, Carlton Roth MD, Hellen Segura MD,  Cherry Dee DO, Carlos Doe MD, Cullen Damon MD, Alban Ramírez DO, Carol Wyatt MD,  Andrew Plata DO, Juanis Lees MD, Hui Laughlin MD, Mira Gee MD, Sonia Escobedo MD,  Gil Wheat MD, Chi Bang MD, Refugio Elliott MD, Livia Hinojosa MD, Sumit Michel MD, Mika Benjamin MD, Presley Hughes DO, Billy Pederson MD, Shirley Waterhouse, CNP,  Corin Kennedy CNP, Presley Billy, CNP,  Sia Hancock, BESSY, Grace Romo, CNP, Nicole Vargas, CNP, Carolynn Mckinney, CNP, Clau Russell, CNP, GÉNESIS ZambranoC, GÉNESIS UpC, Mandy Juárez, CNP, Mc Woo, CNP,  Lupe Ramirez, CNP, Valeria Tai, CNP,  Niru Man, CNP, Janessa Carter, CNP         [facility]   IN-PATIENT SERVICE   [facility]    Progress Note    12/6/2024    1:47 PM    Name:   Osbaldo Duff  MRN:     [id number]     Acct:      [id number]   Room:   [facility] Day:  15  Admit Date:  11/21/2024  2:53 PM    PCP:   No primary care provider on file.  Code Status:  Full Code    Subjective:     C/C:   Chief Complaint   Patient presents with    Abnormal Lab     Interval History Status: improving    Patient seen and evaluated in room, wife in room at time of assessment.  No specific concerns` plaints at this time.  Endorsing fatigue without any other symptoms.  States fatigue is improved    Brief History:     69-year-old patient admitted through the ER initially for evaluation of fatigue with low-grade fever.  Was sent by his PCP office.  Upon presentation was found to have profound pancytopenia.  Bone marrow biopsy completed on 11/11/2024 gave unclear diagnosis and was sent to [facility] which confirmed AEL.  Additional bone  NP  12/6/2024  1:47 PM

## 2024-12-06 NOTE — PROGRESS NOTES
Patient arrived to floor from ICU with RN and spouse and belongings.    Patient denies pain/distress at this time.    Vitals taken and re-acquainted with room

## 2024-12-06 NOTE — PLAN OF CARE
Critical care team - Resident sign-out to medicine service      Date and time: 12/6/2024 11:49 AM  Patient's name:  Osbaldo Duff  Medical Record Number: [id number]  Patient's account/billing number: [id number]  Patient's YOB: 1955  Age: 69 y.o.  Date of Admission: 11/21/2024  2:53 PM  Length of stay during current admission: 15    Primary Care Physician: No primary care provider on file.    Code Status: Full Code    Mode of physician to physician communication:        [x] Via telephone   [] In person     Date and time of sign-out: 12/6/2024 11:49 AM        Accepting Medicine team: intermed  Accepting team's attending: Dr. Esquivel    Patient's current ICU Bed:  3014     Patient's assigned bed on floor:  439        [] Med-Surg Monitored [x] Step-down       [] Psychiatry ICU       [] Psych floor     Reason for ICU admission:     Hypotension    ICU course summary:     Patient admitted to the medical ICU 12/2/2024 after being found to be febrile, tachycardic, hypotensive on the floor.  Patient is currently undergoing chemotherapy has been pancytopenic.  Concerns patient may need pressor support.  Patient was on Levophed for short period while he was in the ICU.  Found to have blood cultures positive for E. coli.  Started on cefepime for antibiotics.  Patient required transfusions of both PRBCs and platelets on several different occasions    Procedures during patient's ICU stay:     None    Current Vitals:     /77   Pulse 76   Temp 98.3 °F (36.8 °C) (Oral)   Resp 20   Ht 1.8 m (5' 10.87\")   Wt 82.5 kg (181 lb 14.1 oz)   SpO2 100%   BMI 25.46 kg/m²       Cultures:     Blood cultures:                 [] None drawn      [] Negative             [x]  Positive (Details: E. coli)  Urine Culture:                   [] None drawn      [] Negative             []  Positive (Details:  )  Sputum Culture:               [] None drawn       [] Negative             []  Positive (Details:  )   Endotracheal

## 2024-12-06 NOTE — PLAN OF CARE
Problem: Infection - Adult  Goal: Absence of infection at discharge  12/6/2024 1338 by Bosler, Mary, RN  Outcome: Progressing  12/6/2024 0452 by Erika Hou RN  Outcome: Progressing     Problem: Hematologic - Adult  Goal: Maintains hematologic stability  12/6/2024 1338 by Bosler, Mary, RN  Outcome: Progressing  12/6/2024 0452 by Erika Hou RN  Outcome: Progressing     Problem: Safety - Adult  Goal: Free from fall injury  12/6/2024 1338 by Bosler, Mary, RN  Outcome: Progressing  12/6/2024 0452 by Erika Hou RN  Outcome: Progressing     Problem: Nutrition Deficit:  Goal: Optimize nutritional status  12/6/2024 1338 by Bosler, Mary, RN  Outcome: Progressing  12/6/2024 0452 by Erika Hou RN  Outcome: Progressing     Problem: Discharge Planning  Goal: Discharge to home or other facility with appropriate resources  12/6/2024 1338 by Bosler, Mary, RN  Outcome: Progressing  12/6/2024 0452 by Erika Hou RN  Outcome: Progressing     Problem: ABCDS Injury Assessment  Goal: Absence of physical injury  12/6/2024 1338 by Bosler, Mary, RN  Outcome: Progressing  12/6/2024 0452 by Erika Hou RN  Outcome: Progressing

## 2024-12-06 NOTE — PROGRESS NOTES
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    CHIEF COMPLAINT:    Chief Complaint   Patient presents with    Abnormal Lab       History Obtained From:  patient and chart  INTERVAL HISTORY:      Interval history:      Patient seen and examined  Labs and vitals reviewed  No new complaint interval event  Afebrile  Remains on cefepime  Platelet count 18,000  Hemoglobin 7.2  Day #13          HISTORY OF PRESENT ILLNESS:    Osbaldo Duff is a 69 y.o. male who is admitted to the hospital on 11/21/2024  for anemia, low hb and acute leukemia diagnosis    Patient was following with Dr. Gallego as outpatient for possible suspected bone marrow pathology including MDS/leukemia.    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    Patient has pancytopenia.  Patient is now sent to ER for further management and likely administration of induction chemotherapy.    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  Anemia and thrombocytopenia     Active and recent treatments:  Follow-up on second opinion from [facility]  Anticipating venetoclax and Vidaza     Summary of Case/History:  Osbaldo Duff a 69 y.o.male is a patient who admitted to the hospital due to abnormal lab workup.  History was obtained through an .  Patient understand very limited English.  Patient has not seen a doctor for a while.  Was seen by primary care provider due to complaints of fatigue also having low-grade  forms and rare metamyelocytes  on scanning.    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  See comment.  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  See comment.    Diagnosis Comment  The above diagnosis and following report are rendered following  consultation with Presley Ferro MD, PhD, of [facility] ([id number]).  He also comments,  \"there are numerous proerythroblasts with strong positivity for p53 by  immunohistochemistry.  The aberrant proerythroblasts comprise  approximately 34% of nucleated cells in the hemodilute aspirate smears  and approximately 70% of core biopsy cellularity based on the provided  E-cadherin stain.  There is also dysgranulopoiesis and  dysmegakaryopoiesis.  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  If a TP53 mutation is detected at  >10% variant allele frequency, this leukemia could also be categorized  as \"acute myeloid leukemia with mutated TP53\" based on the  International Consensus Classification.\"  The case was also reviewed  by additional intradepartmental hematopathologists ([facility]).  The  material submitted for flow cytometric immunophenotypic analysis is  negative for increased blasts and shows no specific immunophenotypic  abnormalities (see flow cytometry addendum).  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  Vitamin B12 and folate levels are within normal limits, as are iron  studies (11/1/2024).  Preliminary findings were discussed with Dr. Jack Gallego via [employer] on 11/14/2024 at 17:41 PM and by phone,  and final findings

## 2024-12-06 NOTE — PLAN OF CARE
Problem: Infection - Adult  Goal: Absence of infection at discharge  12/6/2024 0452 by Erika Hou RN  Outcome: Progressing     Problem: Hematologic - Adult  Goal: Maintains hematologic stability  12/6/2024 0452 by Erika Hou RN  Outcome: Progressing     Problem: Safety - Adult  Goal: Free from fall injury  12/6/2024 0452 by Erika Hou RN  Outcome: Progressing     Problem: Nutrition Deficit:  Goal: Optimize nutritional status  12/6/2024 0452 by Erika Hou RN  Outcome: Progressing     Problem: Discharge Planning  Goal: Discharge to home or other facility with appropriate resources  12/6/2024 0452 by Erika Hou RN  Outcome: Progressing     Problem: ABCDS Injury Assessment  Goal: Absence of physical injury  12/6/2024 0452 by Erika Hou RN  Outcome: Progressing

## 2024-12-06 NOTE — PROGRESS NOTES
[facility] - Progress Note    Today's Date and Time: 12/6/2024, 12:50 PM    Impression :   E. Coli septicemia-detected 12/1/24  Fever  Acute erythroid leukemia  S/p 7-3 chemotherapy that was initiated on 11/20/24  Pancytopenia  Hypotension   HALLE on CKD II    Recommendations:   Discontinued IV Zosyn on 12-4-24  Cefepime 2 gm IV q 12 hr for E coli septicemia. Tentative stop date 12-11-24  Renal dosing  Monitor WBC    Medical Decision Making/Summary/Discussion:12/6/2024     Elements of Medical Decision Making:  Note: I have independently performed the steps listed below as part of the medical decision making and evaluation.   Examined and discussed with patient.  Septicemia, gram-negative  E. coli septicemia 12/1/2024  Fevers  Acute erythroid leukemia  Status post 7-3 chemotherapy started on 11/20/2024  Pancytopenia  Hypotension  HALLE on CKD 2  Labs, medications, radiologic studies were reviewed with personal review of films  Radiologic studies  Lab work: Pancytopenia  Cultures: E. coli septicemia  Large amounts of data were reviewed  Please see history of present illness  and daily progress note below  Discussed with nursing Staff, Discharge planner  Dr Kate's team. Critical care residents  Infection Control and Prevention measures reviewed  Universal precaution  Contact isolation  All prior entries were reviewed  Administer medications as ordered  Zosyn D/C  On cefepime  Prognosis:   Guarded  Discharge planning reviewed  Follow up as outpatient.      Maicol Puckett MD     Infection Control Recommendations   Ione Precautions  Neutropenic precautions    Antimicrobial Stewardship Recommendations     Simplification of therapy  Targeted therapy  Renal dosing    Coordination of Outpatient Care:   Estimated Length of IV antimicrobials:TBD  Patient will need Midline Catheter Insertion: TBD  Patient will need PICC line Insertion:TBD  Patient will need: Home IV , Infusion  participate in the care of this patient. Please call with questions.    NOEMI TANNER          ATTESTATION:    I have discussed the case, including pertinent history and exam findings with the medical resident. I have evaluated the  History, physical findings and pictures of the patient and the key elements of the encounter have been performed by me. I have reviewed the laboratory data, other diagnostic studies and discussed them with the medical resident. I have updated the medical record where necessary.    I agree with the assessment, plan and orders as documented by the medical resident and I have modified them as necessary.       Maicol Puckett MD.    Pager: [phone] - Office: [phone]        12/6/2024

## 2024-12-06 NOTE — PROGRESS NOTES
INTENSIVE CARE UNIT  Resident Physician Progress Note    Patient - Osblado Duff  Date of Admission -  11/21/2024  2:53 PM  Date of Evaluation -  12/6/2024  Room and Bed Number -  3014/3014-01   Hospital Day - 15    SUBJECTIVE:     HISTORY OF PRESENT ILLNESS:    History was obtained from chart review and the patient.       Osbaldo Duff is a 69 y.o. 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  Patient has been undergoing chemotherapy on the floor, following with heme-onc while inpatient.  Patient receiving cytarabine and Decadron while inpatient.  Overnight, patient had rapid response called initially due to concerns for tachycardia with rates into the 150s as well as fevers up to 104 °F.  At that time, patient had ice packs applied to his body and was given Tylenol.  This did briefly resolve his symptoms.  Rapid response was called again early this morning, concerns for persistent hypotension despite fluid bolus.  Patient's maps were sustained in the 50s.  Patient asymptomatic at that time denying chest pain, shortness of breath, dizziness.  Patient requiring transfer to ICU at that time due to need for pressor support.     After initial rapid response, admitting team had changed antibiotics to Zosyn and consulted infectious disease.  CT sinuses, abdomen pelvis, chest x-ray was also ordered at that time to rule out infectious cause     Overall there is significant concern for possible septic shock in the setting of neutropenia due to chemotherapy.     Most recent lab work showing WBC count of 0.1, hemoglobin of 7.3, platelet count of 9    OVERNIGHT EVENTS:      No acute events overnight     TODAY: Patient alert and awake.  Vital signs stable, no pressor requirements.  Patient on cefepime for antibiotics   AWAKE & FOLLOWING COMMANDS: []   No  [x]   Yes        MAKING:  Neurologic:   Neuro intact   Neuro checks per protocol  Cardiovascular:  Hemodynamically stable  MAP goal >65  Pulmonary:  On room air  Maintain oxygen sats >92%  Pulmonary toilet  GI/Nutrition  dulcolax suppository every other evening  Ulcer Prophylaxis:  not indicated  Diet:ADULT DIET; Regular  ADULT ORAL NUTRITION SUPPLEMENT; Breakfast, Dinner; Clear Liquid Oral Supplement  ADULT ORAL NUTRITION SUPPLEMENT; Lunch, Dinner; Frozen Oral Supplement  Renal/Fluid/Electrolyte  I/O: In: 944.7 [P.O.:600; I.V.:49.7]  Out: 550 [Urine:550]  UOP: 0.3 cc/kg/hr  Na/K 139/3.9  BUN/Cr 14/1.0  Monitor electrolytes, replace PRN   ID  Blood cultures + e. coli   WBC 0.6  Tmax: afebrile  Antimicrobials: cefepime   Hematology:  Anemia and thrombocytopenia  H+H 7.4  Platelets pending  Endocrine:     Glucose controlled   DVT Prophylaxis  SCD sleeves -thigh high  Lines  PIV  Discharge Needs:  PT, OT, ST, SW, and Case Management      CODE STATUS: Full Code      DISPOSITION:  [] To remain ICU:   [x] OK for out of ICU from Critical Care standpoint      Veronica Tapia DO  Emergency Medicine Resident  Critical Care Service  [facility]  12/6/2024, 8:28 AM EST

## 2024-12-07 LAB
ALBUMIN SERPL-MCNC: 3.7 G/DL (ref 3.5–5.2)
ALBUMIN/GLOB SERPL: 1.3 {RATIO} (ref 1–2.5)
ALP SERPL-CCNC: 86 U/L (ref 40–129)
ALT SERPL-CCNC: 24 U/L (ref 10–50)
ANION GAP SERPL CALCULATED.3IONS-SCNC: 9 MMOL/L (ref 9–16)
AST SERPL-CCNC: 10 U/L (ref 10–50)
BASOPHILS # BLD: 0 K/UL (ref 0–0.2)
BASOPHILS NFR BLD: 0 % (ref 0–2)
BILIRUB SERPL-MCNC: 0.8 MG/DL (ref 0–1.2)
BUN SERPL-MCNC: 15 MG/DL (ref 8–23)
CALCIUM SERPL-MCNC: 9 MG/DL (ref 8.6–10.4)
CHLORIDE SERPL-SCNC: 106 MMOL/L (ref 98–107)
CO2 SERPL-SCNC: 21 MMOL/L (ref 20–31)
CREAT SERPL-MCNC: 1 MG/DL (ref 0.7–1.2)
EOSINOPHIL # BLD: 0 K/UL (ref 0–0.4)
EOSINOPHILS RELATIVE PERCENT: 0 % (ref 1–4)
ERYTHROCYTE [DISTWIDTH] IN BLOOD BY AUTOMATED COUNT: 13.3 % (ref 11.8–14.4)
ERYTHROCYTE [DISTWIDTH] IN BLOOD BY AUTOMATED COUNT: 13.3 % (ref 11.8–14.4)
GFR, ESTIMATED: 82 ML/MIN/1.73M2
GLUCOSE SERPL-MCNC: 95 MG/DL (ref 74–99)
HCT VFR BLD AUTO: 23.3 % (ref 40.7–50.3)
HCT VFR BLD AUTO: 23.8 % (ref 40.7–50.3)
HGB BLD-MCNC: 7.4 G/DL (ref 13–17)
HGB BLD-MCNC: 7.6 G/DL (ref 13–17)
IMM GRANULOCYTES # BLD AUTO: 0 K/UL (ref 0–0.3)
IMM GRANULOCYTES NFR BLD: 0 %
LYMPHOCYTES NFR BLD: 0.58 K/UL (ref 1–4.8)
LYMPHOCYTES RELATIVE PERCENT: 97 % (ref 24–44)
MCH RBC QN AUTO: 29.2 PG (ref 25.2–33.5)
MCH RBC QN AUTO: 29.4 PG (ref 25.2–33.5)
MCHC RBC AUTO-ENTMCNC: 31.8 G/DL (ref 28.4–34.8)
MCHC RBC AUTO-ENTMCNC: 31.9 G/DL (ref 28.4–34.8)
MCV RBC AUTO: 91.5 FL (ref 82.6–102.9)
MCV RBC AUTO: 92.5 FL (ref 82.6–102.9)
MICROORGANISM SPEC CULT: NORMAL
MONOCYTES NFR BLD: 0.01 K/UL (ref 0.1–0.8)
MONOCYTES NFR BLD: 2 % (ref 1–7)
MORPHOLOGY: NORMAL
NEUTROPHILS NFR BLD: 1 % (ref 36–66)
NEUTS SEG NFR BLD: 0.01 K/UL (ref 1.8–7.7)
NRBC BLD-RTO: 0 PER 100 WBC
NRBC BLD-RTO: 0 PER 100 WBC
PLATELET # BLD AUTO: ABNORMAL K/UL (ref 138–453)
PLATELET # BLD AUTO: ABNORMAL K/UL (ref 138–453)
PLATELET, FLUORESCENCE: 16 K/UL (ref 138–453)
PLATELET, FLUORESCENCE: 4 K/UL (ref 138–453)
PLATELETS.RETICULATED NFR BLD AUTO: 1.1 % (ref 1.1–10.3)
PLATELETS.RETICULATED NFR BLD AUTO: 1.7 % (ref 1.1–10.3)
POTASSIUM SERPL-SCNC: 4.5 MMOL/L (ref 3.7–5.3)
PROT SERPL-MCNC: 6.5 G/DL (ref 6.6–8.7)
RBC # BLD AUTO: 2.52 M/UL (ref 4.21–5.77)
RBC # BLD AUTO: 2.6 M/UL (ref 4.21–5.77)
SODIUM SERPL-SCNC: 136 MMOL/L (ref 136–145)
SPECIMEN DESCRIPTION: NORMAL
WBC OTHER # BLD: 0.6 K/UL (ref 3.5–11.3)
WBC OTHER # BLD: 0.7 K/UL (ref 3.5–11.3)

## 2024-12-07 PROCEDURE — 2580000003 HC RX 258: Performed by: INTERNAL MEDICINE

## 2024-12-07 PROCEDURE — P9073 PLATELETS PHERESIS PATH REDU: HCPCS

## 2024-12-07 PROCEDURE — 2580000003 HC RX 258: Performed by: NURSE PRACTITIONER

## 2024-12-07 PROCEDURE — 80053 COMPREHEN METABOLIC PANEL: CPT

## 2024-12-07 PROCEDURE — 36415 COLL VENOUS BLD VENIPUNCTURE: CPT

## 2024-12-07 PROCEDURE — 6360000002 HC RX W HCPCS: Performed by: INTERNAL MEDICINE

## 2024-12-07 PROCEDURE — 99232 SBSQ HOSP IP/OBS MODERATE 35: CPT | Performed by: INTERNAL MEDICINE

## 2024-12-07 PROCEDURE — 85055 RETICULATED PLATELET ASSAY: CPT

## 2024-12-07 PROCEDURE — 6370000000 HC RX 637 (ALT 250 FOR IP)

## 2024-12-07 PROCEDURE — 99232 SBSQ HOSP IP/OBS MODERATE 35: CPT | Performed by: NURSE PRACTITIONER

## 2024-12-07 PROCEDURE — 6370000000 HC RX 637 (ALT 250 FOR IP): Performed by: NURSE PRACTITIONER

## 2024-12-07 PROCEDURE — 36430 TRANSFUSION BLD/BLD COMPNT: CPT

## 2024-12-07 PROCEDURE — 85025 COMPLETE CBC W/AUTO DIFF WBC: CPT

## 2024-12-07 PROCEDURE — 85027 COMPLETE CBC AUTOMATED: CPT

## 2024-12-07 PROCEDURE — 6370000000 HC RX 637 (ALT 250 FOR IP): Performed by: INTERNAL MEDICINE

## 2024-12-07 PROCEDURE — 2060000000 HC ICU INTERMEDIATE R&B

## 2024-12-07 RX ORDER — ACYCLOVIR 200 MG/1
400 CAPSULE ORAL 3 TIMES DAILY
Status: COMPLETED | OUTPATIENT
Start: 2024-12-07 | End: 2024-12-12

## 2024-12-07 RX ORDER — SODIUM CHLORIDE 9 MG/ML
INJECTION, SOLUTION INTRAVENOUS PRN
Status: DISCONTINUED | OUTPATIENT
Start: 2024-12-07 | End: 2024-12-08

## 2024-12-07 RX ADMIN — SODIUM CHLORIDE, PRESERVATIVE FREE 10 ML: 5 INJECTION INTRAVENOUS at 09:42

## 2024-12-07 RX ADMIN — METOPROLOL SUCCINATE 25 MG: 25 TABLET, EXTENDED RELEASE ORAL at 09:13

## 2024-12-07 RX ADMIN — CEFEPIME 2000 MG: 2 INJECTION, POWDER, FOR SOLUTION INTRAVENOUS at 00:27

## 2024-12-07 RX ADMIN — CEFEPIME 2000 MG: 2 INJECTION, POWDER, FOR SOLUTION INTRAVENOUS at 12:27

## 2024-12-07 RX ADMIN — ACETAMINOPHEN 650 MG: 325 TABLET ORAL at 00:25

## 2024-12-07 RX ADMIN — SODIUM CHLORIDE, PRESERVATIVE FREE 10 ML: 5 INJECTION INTRAVENOUS at 20:20

## 2024-12-07 RX ADMIN — ACYCLOVIR 400 MG: 200 CAPSULE ORAL at 20:19

## 2024-12-07 RX ADMIN — THERA TABS 1 TABLET: TAB at 09:13

## 2024-12-07 ASSESSMENT — PAIN SCALES - GENERAL
PAINLEVEL_OUTOF10: 0
PAINLEVEL_OUTOF10: 0

## 2024-12-07 NOTE — PROGRESS NOTES
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    CHIEF COMPLAINT:    Chief Complaint   Patient presents with    Abnormal Lab       History Obtained From:  patient and chart  INTERVAL HISTORY:          Interval history:  Day #15  Patient is afebrile and doing quite  Continues to have some diarrhea.  No nausea or vomiting  Received platelet transfusion today  Lip herpetic lesion      HISTORY OF PRESENT ILLNESS:    Osbaldo Duff is a 69 y.o. male who is admitted to the hospital on 11/21/2024  for anemia, low hb and acute leukemia diagnosis    Patient was following with Dr. Gallego as outpatient for possible suspected bone marrow pathology including MDS/leukemia.    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    Patient has pancytopenia.  Patient is now sent to ER for further management and likely administration of induction chemotherapy.    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  History was obtained through an .  Patient understand very limited English.  Patient has not seen a doctor for a while.  Was seen by primary care provider due to complaints of fatigue also having low-grade fever.   via  [employer] on 11/21/2024 at 12:49 PM.  Results of myeloid NGS  testing will follow as an addendum once completed.      AYAAN Velarde  **Electronically Signed Out**        sf/11/14/2024  Clinical Information  Pre-Op Diagnosis:  THROMBOCYTOPENIA; LOW HEMOGLOBIN  Operation Performed:  BONE MARROW BIOPSY  se     IMAGING DATA:  XR CHEST PORTABLE   Final Result   1. No acute cardiopulmonary process.   2. Left upper extremity PICC terminates in the lower superior vena cava.             Primary Problem  Acute erythroid leukemia (HCC)    Active Hospital Problems    Diagnosis Date Noted    Pancytopenia (HCC) [D61.818] 12/04/2024    E. 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] 11/22/2024    Admission for chemotherapy [Z51.11] 11/22/2024    Acute leukemia not having achieved remission (HCC) [C95.00] 11/21/2024    HTN (hypertension) [I10] 11/21/2024    Thrombocytopenia (HCC) [D69.6] 11/01/2024    Acute anemia [D64.9] 11/01/2024     IMPRESSION:   Acute erythroid leukemia,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  Overall high risk category  Antibiotics per

## 2024-12-07 NOTE — PLAN OF CARE
Problem: Infection - Adult  Goal: Absence of infection at discharge  Outcome: Progressing     Problem: Hematologic - Adult  Goal: Maintains hematologic stability  Outcome: Progressing     Problem: Safety - Adult  Goal: Free from fall injury  Outcome: Progressing     Problem: Nutrition Deficit:  Goal: Optimize nutritional status  Outcome: Progressing     Problem: Discharge Planning  Goal: Discharge to home or other facility with appropriate resources  Outcome: Progressing     Problem: ABCDS Injury Assessment  Goal: Absence of physical injury  Outcome: Progressing     Problem: Pain  Goal: Verbalizes/displays adequate comfort level or baseline comfort level  Outcome: Progressing

## 2024-12-07 NOTE — PLAN OF CARE
Problem: Infection - Adult  Goal: Absence of infection at discharge  12/7/2024 1653 by Ivanna Barba RN  Outcome: Progressing  12/7/2024 0405 by Leonela Weinberg RN  Outcome: Progressing     Problem: Hematologic - Adult  Goal: Maintains hematologic stability  12/7/2024 1653 by Ivanna Barba RN  Outcome: Progressing  12/7/2024 0405 by Leonela Weinberg RN  Outcome: Progressing     Problem: Safety - Adult  Goal: Free from fall injury  12/7/2024 1653 by Ivanna Barba RN  Outcome: Progressing  12/7/2024 0405 by Leonela Weinberg RN  Outcome: Progressing     Problem: Nutrition Deficit:  Goal: Optimize nutritional status  12/7/2024 0405 by Leonela Weinberg RN  Outcome: Progressing     Problem: Discharge Planning  Goal: Discharge to home or other facility with appropriate resources  12/7/2024 0405 by Leonela Weinberg RN  Outcome: Progressing     Problem: ABCDS Injury Assessment  Goal: Absence of physical injury  12/7/2024 1653 by Ivanna Barba RN  Outcome: Progressing  12/7/2024 0405 by Leonela Weinberg RN  Outcome: Progressing     Problem: Pain  Goal: Verbalizes/displays adequate comfort level or baseline comfort level  12/7/2024 1653 by Ivanna Barba RN  Outcome: Progressing  12/7/2024 0405 by Leonela Weinberg RN  Outcome: Progressing

## 2024-12-07 NOTE — PROGRESS NOTES
[facility] - Progress Note    Today's Date and Time: 12/7/2024, 9:13 AM    Impression :   E. Coli septicemia-detected 12/1/24  Fever  Acute erythroid leukemia  S/p 7-3 chemotherapy that was initiated on 11/20/24  Pancytopenia  Hypotension   HALLE on CKD II    Recommendations:   Discontinued IV Zosyn on 12-4-24  Cefepime 2 gm IV q 12 hr for E coli septicemia. Tentative stop date 12-11-24  Renal dosing  Monitor WBC    Medical Decision Making/Summary/Discussion:12/7/2024     Elements of Medical Decision Making:  Note: I have independently performed the steps listed below as part of the medical decision making and evaluation.   Examined and discussed with patient.  Septicemia, gram-negative  E. coli septicemia 12/1/2024  Fevers  Acute erythroid leukemia  Status post 7-3 chemotherapy started on 11/20/2024  Pancytopenia  Hypotension  HALLE on CKD 2  Labs, medications, radiologic studies were reviewed with personal review of films  Radiologic studies  Lab work: Pancytopenia  Cultures: E. coli septicemia  Large amounts of data were reviewed  Please see history of present illness  and daily progress note below  Discussed with nursing Staff, Discharge planner  Dr Kate's team. Critical care residents  Infection Control and Prevention measures reviewed  Universal precaution  Contact isolation  All prior entries were reviewed  Administer medications as ordered  Zosyn D/C  On cefepime  Prognosis:   Guarded  Discharge planning reviewed  Follow up as outpatient.      Maicol Puckett MD     Infection Control Recommendations   [place] Precautions  Neutropenic precautions    Antimicrobial Stewardship Recommendations     Simplification of therapy  Targeted therapy  Renal dosing    Coordination of Outpatient Care:   Estimated Length of IV antimicrobials:TBD  Patient will need Midline Catheter Insertion: TBD  Patient will need PICC line Insertion:TBD  Patient will need: Home IV , Infusion  4 PCR Not Detected     Resp Syncytial Virus PCR Not Detected     Bordetella parapertussis by PCR Not Detected     B Pertussis by PCR Not Detected     Chlamydia pneumoniae By PCR Not Detected     Mycoplasma pneumo by PCR Not Detected     Comment: Performed by multiplexed nucleic acid assay.       Culture, Respiratory [[id number]]     Order Status: Canceled Specimen: Sputum Expectorated     MRSA DNA Probe, Nasal [[id number]] Collected: 12/02/24 0705    Order Status: Canceled Specimen: Nares     Culture, Blood 2 [[id number]]  (Abnormal)  (Susceptibility) Collected: 12/01/24 2106    Order Status: Completed Specimen: Blood Updated: 12/04/24 0110     Specimen Description .BLOOD     Special Requests R HAND .05ML     Culture POSITIVE Blood Culture      DIRECT GRAM STAIN FROM BOTTLE: GRAM NEGATIVE RODS      Escherichia coli Detected: Methodology- Polymerase Chain Reaction (PCR)      ESCHERICHIA COLI      (NOTE) Direct Gram Stain from bottle and Polymerase Chain Reaction (PCR) results called to and read back by:BENJA PURDY  ON [id number]    Susceptibility        Escherichia coli      BACTERIAL SUSCEPTIBILITY PANEL OCTAVIO      ampicillin <=2  Sensitive      ceFAZolin <=4  Sensitive      cefTRIAXone <=0.25  Sensitive      Confirmatory Extended Spectrum Beta-Lactamase NEGATIVE  Sensitive      gentamicin <=1  Sensitive      levofloxacin <=0.12  Sensitive      piperacillin-tazobactam <=4  Sensitive      tobramycin <=1  Sensitive      trimethoprim-sulfamethoxazole <=20  Sensitive                           Culture, Blood 1 [[id number]] Collected: 12/01/24 2103    Order Status: Completed Specimen: Blood Updated: 12/06/24 2209     Specimen Description .BLOOD     Special Requests R AC 2ML     Culture NO GROWTH 5 DAYS              Medical Decision Making-Other:     Note:      Thank you for allowing us to participate in the care of this patient. Please call with questions.    NOEMI TANNER          ATTESTATION:    I have discussed the case,

## 2024-12-07 NOTE — CONSULTS
VAT consult to assess PICC, per RN white lumen not flushing or drawing blood and pink lumen flushes but no blood return. VAT to bedside, power flush to pink lumen, pink lumen now has blood return. White lumen power flushed at this time, white lumen now flushes but minimal blood return noted. Dressing has dried blood to site, but dressing is dry and intact. Site of PICC is non tender, no redness noted, appears normal. External of PICC is 1 cm as it was when initially placed. Vat recommends continue using PICC at this time and consult VAT for further concerns. Primary RN updated and aware of situation. No questions at this time.

## 2024-12-07 NOTE — PROGRESS NOTES
[facility]  Office: [phone]  Carlos Ramírez DO, Jerry Washington DO, Keo Esquivel DO, Matthieu Steven DO, Janet Barr MD, Ivy Rome MD, Kimberlyn Jenkins MD, Deborah Dunbar MD,  Nick Garcia MD, Carlton Roth MD, Hellen Segura MD,  Cherry Dee DO, Carlos Doe MD, Cullen Damon MD, Alban Ramírez DO, Carol Wyatt MD,  Andrew Plata DO, Juanis Lees MD, Hui Laughlin MD, Mira Gee MD, Sonia Escobedo MD,  Gil Wheat MD, Chi Bang MD, Refugio Elliott MD, Livia Hinojosa MD, Sumit Michel MD, Mika Benjamin MD, Presley Hughes DO, Billy Pederson MD, Shirley Waterhouse, CNP,  Corin Kennedy CNP, Presley Billy, JIMENEZ,  Sia Hancock, BESSY, Grace Romo, CNP, Nicole Vargas, CNP, Carolynn Mckinney, CNP, Clau Russell, CNP, Faye Siu PA-C, Elena Rosen PA-C, Mandy Juárez, CNP, Mc Woo, CNP,  Lupe Ramirez, CNP, Valeria Tai, CNP,  Niru Man, CNP, Janessa Carter, CNP         [facility]   IN-PATIENT SERVICE   [facility]    Progress Note    12/7/2024    6:26 AM    Name:   Osbaldo Duff  MRN:     [id number]     Acct:      [id number]   Room:   0439/0439-01   Day:  16  Admit Date:  11/21/2024  2:53 PM    PCP:   No primary care provider on file.  Code Status:  Full Code    Subjective:     C/C:   Chief Complaint   Patient presents with    Abnormal Lab     Interval History Status: improving    Febrile overnight  Received platelets yesterday, ordered again for this morning for a platelet count of 4  Patient endorsing some oral discomfort on the right side, using Orajel.    Brief History:     69-year-old patient admitted through the ER initially for evaluation of fatigue with low-grade fever.  Was sent by his PCP office.  Upon presentation was found to have profound pancytopenia.  Bone marrow biopsy completed on 11/11/2024 gave unclear diagnosis and was sent to [facility] which confirmed AEL.  Additional bone marrow biopsy

## 2024-12-08 LAB
ALBUMIN SERPL-MCNC: 3.5 G/DL (ref 3.5–5.2)
ALBUMIN/GLOB SERPL: 1.3 {RATIO} (ref 1–2.5)
ALP SERPL-CCNC: 88 U/L (ref 40–129)
ALT SERPL-CCNC: 25 U/L (ref 10–50)
ANION GAP SERPL CALCULATED.3IONS-SCNC: 11 MMOL/L (ref 9–16)
AST SERPL-CCNC: 10 U/L (ref 10–50)
BILIRUB SERPL-MCNC: 0.5 MG/DL (ref 0–1.2)
BLOOD BANK BLOOD PRODUCT EXPIRATION DATE: NORMAL
BLOOD BANK DISPENSE STATUS: NORMAL
BLOOD BANK ISBT PRODUCT BLOOD TYPE: 7300
BLOOD BANK PRODUCT CODE: NORMAL
BLOOD BANK UNIT TYPE AND RH: NORMAL
BPU ID: NORMAL
BUN SERPL-MCNC: 19 MG/DL (ref 8–23)
CALCIUM SERPL-MCNC: 8.5 MG/DL (ref 8.6–10.4)
CHLORIDE SERPL-SCNC: 107 MMOL/L (ref 98–107)
CO2 SERPL-SCNC: 19 MMOL/L (ref 20–31)
COMPONENT: NORMAL
CREAT SERPL-MCNC: 1 MG/DL (ref 0.7–1.2)
ERYTHROCYTE [DISTWIDTH] IN BLOOD BY AUTOMATED COUNT: 13.1 % (ref 11.8–14.4)
ERYTHROCYTE [DISTWIDTH] IN BLOOD BY AUTOMATED COUNT: 13.4 % (ref 11.8–14.4)
GFR, ESTIMATED: 82 ML/MIN/1.73M2
GLUCOSE BLD-MCNC: 97 MG/DL (ref 75–110)
GLUCOSE SERPL-MCNC: 96 MG/DL (ref 74–99)
HCT VFR BLD AUTO: 21.3 % (ref 40.7–50.3)
HCT VFR BLD AUTO: 23.5 % (ref 40.7–50.3)
HGB BLD-MCNC: 6.6 G/DL (ref 13–17)
HGB BLD-MCNC: 7.6 G/DL (ref 13–17)
MCH RBC QN AUTO: 28.9 PG (ref 25.2–33.5)
MCH RBC QN AUTO: 29.3 PG (ref 25.2–33.5)
MCHC RBC AUTO-ENTMCNC: 31 G/DL (ref 28.4–34.8)
MCHC RBC AUTO-ENTMCNC: 32.3 G/DL (ref 28.4–34.8)
MCV RBC AUTO: 90.7 FL (ref 82.6–102.9)
MCV RBC AUTO: 93.4 FL (ref 82.6–102.9)
NRBC BLD-RTO: 0 PER 100 WBC
NRBC BLD-RTO: 0 PER 100 WBC
PLATELET # BLD AUTO: ABNORMAL K/UL (ref 138–453)
PLATELET # BLD AUTO: ABNORMAL K/UL (ref 138–453)
PLATELET, FLUORESCENCE: 18 K/UL (ref 138–453)
PLATELET, FLUORESCENCE: 9 K/UL (ref 138–453)
PLATELETS.RETICULATED NFR BLD AUTO: 0.7 % (ref 1.1–10.3)
PLATELETS.RETICULATED NFR BLD AUTO: 1.1 % (ref 1.1–10.3)
POTASSIUM SERPL-SCNC: 3.9 MMOL/L (ref 3.7–5.3)
PROT SERPL-MCNC: 6.3 G/DL (ref 6.6–8.7)
RBC # BLD AUTO: 2.28 M/UL (ref 4.21–5.77)
RBC # BLD AUTO: 2.59 M/UL (ref 4.21–5.77)
SODIUM SERPL-SCNC: 137 MMOL/L (ref 136–145)
TRANSFUSION STATUS: NORMAL
UNIT DIVISION: 0
UNIT ISSUE DATE/TIME: NORMAL
WBC OTHER # BLD: 0.6 K/UL (ref 3.5–11.3)
WBC OTHER # BLD: 0.7 K/UL (ref 3.5–11.3)

## 2024-12-08 PROCEDURE — 99232 SBSQ HOSP IP/OBS MODERATE 35: CPT | Performed by: NURSE PRACTITIONER

## 2024-12-08 PROCEDURE — 6370000000 HC RX 637 (ALT 250 FOR IP): Performed by: NURSE PRACTITIONER

## 2024-12-08 PROCEDURE — 86923 COMPATIBILITY TEST ELECTRIC: CPT

## 2024-12-08 PROCEDURE — 36430 TRANSFUSION BLD/BLD COMPNT: CPT

## 2024-12-08 PROCEDURE — 2580000003 HC RX 258: Performed by: INTERNAL MEDICINE

## 2024-12-08 PROCEDURE — 2060000000 HC ICU INTERMEDIATE R&B

## 2024-12-08 PROCEDURE — 85055 RETICULATED PLATELET ASSAY: CPT

## 2024-12-08 PROCEDURE — P9073 PLATELETS PHERESIS PATH REDU: HCPCS

## 2024-12-08 PROCEDURE — 86900 BLOOD TYPING SEROLOGIC ABO: CPT

## 2024-12-08 PROCEDURE — 86850 RBC ANTIBODY SCREEN: CPT

## 2024-12-08 PROCEDURE — 36415 COLL VENOUS BLD VENIPUNCTURE: CPT

## 2024-12-08 PROCEDURE — 85027 COMPLETE CBC AUTOMATED: CPT

## 2024-12-08 PROCEDURE — 6360000002 HC RX W HCPCS: Performed by: INTERNAL MEDICINE

## 2024-12-08 PROCEDURE — 86901 BLOOD TYPING SEROLOGIC RH(D): CPT

## 2024-12-08 PROCEDURE — 82947 ASSAY GLUCOSE BLOOD QUANT: CPT

## 2024-12-08 PROCEDURE — 6370000000 HC RX 637 (ALT 250 FOR IP): Performed by: INTERNAL MEDICINE

## 2024-12-08 PROCEDURE — P9016 RBC LEUKOCYTES REDUCED: HCPCS

## 2024-12-08 PROCEDURE — 99232 SBSQ HOSP IP/OBS MODERATE 35: CPT | Performed by: INTERNAL MEDICINE

## 2024-12-08 PROCEDURE — 80053 COMPREHEN METABOLIC PANEL: CPT

## 2024-12-08 PROCEDURE — 2580000003 HC RX 258: Performed by: NURSE PRACTITIONER

## 2024-12-08 RX ORDER — SODIUM CHLORIDE 9 MG/ML
INJECTION, SOLUTION INTRAVENOUS PRN
Status: DISCONTINUED | OUTPATIENT
Start: 2024-12-08 | End: 2024-12-11

## 2024-12-08 RX ADMIN — ACYCLOVIR 400 MG: 200 CAPSULE ORAL at 08:21

## 2024-12-08 RX ADMIN — ACYCLOVIR 400 MG: 200 CAPSULE ORAL at 13:42

## 2024-12-08 RX ADMIN — METOPROLOL SUCCINATE 25 MG: 25 TABLET, EXTENDED RELEASE ORAL at 08:21

## 2024-12-08 RX ADMIN — THERA TABS 1 TABLET: TAB at 08:21

## 2024-12-08 RX ADMIN — SODIUM CHLORIDE, PRESERVATIVE FREE 10 ML: 5 INJECTION INTRAVENOUS at 20:51

## 2024-12-08 RX ADMIN — CEFEPIME 2000 MG: 2 INJECTION, POWDER, FOR SOLUTION INTRAVENOUS at 00:58

## 2024-12-08 RX ADMIN — SODIUM CHLORIDE, PRESERVATIVE FREE 10 ML: 5 INJECTION INTRAVENOUS at 08:23

## 2024-12-08 RX ADMIN — CEFEPIME 2000 MG: 2 INJECTION, POWDER, FOR SOLUTION INTRAVENOUS at 14:35

## 2024-12-08 RX ADMIN — ACYCLOVIR 400 MG: 200 CAPSULE ORAL at 20:51

## 2024-12-08 ASSESSMENT — PAIN SCALES - GENERAL: PAINLEVEL_OUTOF10: 0

## 2024-12-08 NOTE — PLAN OF CARE
Problem: Infection - Adult  Goal: Absence of infection at discharge  Outcome: Progressing     Problem: Hematologic - Adult  Goal: Maintains hematologic stability  Outcome: Progressing     Problem: Safety - Adult  Goal: Free from fall injury  Outcome: Progressing     Problem: ABCDS Injury Assessment  Goal: Absence of physical injury  Outcome: Progressing     Problem: Pain  Goal: Verbalizes/displays adequate comfort level or baseline comfort level  Outcome: Progressing

## 2024-12-08 NOTE — PROGRESS NOTES
[facility] - Progress Note    Today's Date and Time: 12/8/2024, 11:43 AM    Impression :   E. Coli septicemia-detected 12/1/24  Fever  Acute erythroid leukemia  S/p 7-3 chemotherapy that was initiated on 11/20/24  Pancytopenia  Hypotension   HALLE on CKD II    Recommendations:   Discontinued IV Zosyn on 12-4-24  Cefepime 2 gm IV q 12 hr for E coli septicemia. Tentative stop date 12-11-24  Renal dosing  Monitor WBC    Medical Decision Making/Summary/Discussion:12/8/2024     Elements of Medical Decision Making:  Note: I have independently performed the steps listed below as part of the medical decision making and evaluation.   Examined and discussed with patient.  Septicemia, gram-negative  E. coli septicemia 12/1/2024  Fevers  Acute erythroid leukemia  Status post 7-3 chemotherapy started on 11/20/2024  Pancytopenia  Hypotension  HALLE on CKD 2  Labs, medications, radiologic studies were reviewed with personal review of films  Radiologic studies  Lab work: Pancytopenia  Cultures: E. coli septicemia  Large amounts of data were reviewed  Please see history of present illness  and daily progress note below  Discussed with nursing Staff, Discharge planner  Dr Kate's team. Critical care residents  Infection Control and Prevention measures reviewed  Universal precaution  Contact isolation  All prior entries were reviewed  Administer medications as ordered  Zosyn D/C  On cefepime  Prognosis:   Guarded  Discharge planning reviewed  Follow up as outpatient.      aMicol Puckett MD     Infection Control Recommendations   [place] Precautions  Neutropenic precautions    Antimicrobial Stewardship Recommendations     Simplification of therapy  Targeted therapy  Renal dosing    Coordination of Outpatient Care:   Estimated Length of IV antimicrobials:TBD  Patient will need Midline Catheter Insertion: TBD  Patient will need PICC line Insertion:TBD  Patient will need: Home IV , Infusion

## 2024-12-08 NOTE — PLAN OF CARE
Problem: Infection - Adult  Goal: Absence of infection at discharge  12/7/2024 2155 by Yudith Adams RN  Outcome: Progressing  12/7/2024 1653 by Ivanna Barba RN  Outcome: Progressing     Problem: Hematologic - Adult  Goal: Maintains hematologic stability  12/7/2024 2155 by Yudith Adams RN  Outcome: Progressing  12/7/2024 1653 by Ivanna Barba RN  Outcome: Progressing     Problem: Safety - Adult  Goal: Free from fall injury  12/7/2024 2155 by Yudith Adams RN  Outcome: Progressing  12/7/2024 1653 by Ivanna Barba RN  Outcome: Progressing     Problem: Nutrition Deficit:  Goal: Optimize nutritional status  Outcome: Progressing     Problem: Discharge Planning  Goal: Discharge to home or other facility with appropriate resources  Outcome: Progressing     Problem: ABCDS Injury Assessment  Goal: Absence of physical injury  12/7/2024 2155 by Yudith Adams RN  Outcome: Progressing  12/7/2024 1653 by Ivanna Barba RN  Outcome: Progressing     Problem: Pain  Goal: Verbalizes/displays adequate comfort level or baseline comfort level  12/7/2024 2155 by Yudith Adams RN  Outcome: Progressing  12/7/2024 1653 by Ivanna Barba RN  Outcome: Progressing

## 2024-12-08 NOTE — PROGRESS NOTES
[facility]  Office: [phone]  Carlos Ramírez DO, Jerry Washington DO, Keo Esquivel DO, Matthieu Steven DO, Janet Barr MD, Ivy Rome MD, Kimberlyn Jenkins MD, Deborah Dunbar MD,  Nick Garcia MD, Carlton Roth MD, Hellen Segura MD,  Cherry Dee DO, Carlos Doe MD, Cullen Damon MD, Alban Ramírez DO, Carol Wyatt MD,  Andrew Plata DO, Juanis Lees MD, Hui Laughlin MD, Mira Gee MD, Sonia Escobedo MD,  Gil Wheat MD, Chi Bang MD, Refugio Elliott MD, Livia Hinojosa MD, Sumit Michel MD, Mika Benjamin MD, Presley Hughes DO, Billy Pederson MD, Shirley Waterhouse, CNP,  Corin Kennedy CNP, Presley Billy, JIMENEZ,  Sia Hancock, BESSY, Grace Romo, CNP, Nicole Vargas, CNP, Carolynn Mckinney, CNP, Clau Russell, CNP, Faye Siu PA-C, Elena Rosen PA-C, Mandy Juárez, CNP, Mc Woo, CNP,  Lupe Ramirez, CNP, Valeria Tai, CNP,  Niru Man, CNP, Janessa Carter, CNP         [facility]   IN-PATIENT SERVICE   [facility]    Progress Note    12/8/2024    6:12 AM    Name:   Osbaldo Duff  MRN:     [id number]     Acct:      [id number]   Room:   0439/0439-01   Day:  17  Admit Date:  11/21/2024  2:53 PM    PCP:   No primary care provider on file.  Code Status:  Full Code    Subjective:     C/C:   Chief Complaint   Patient presents with    Abnormal Lab     Interval History Status: improving    Received 1 unit of platelets yesterday, for count of 4, recheck was 16.  CBC from this a.m. showing hemoglobin of 6.6 and platelet count of 9.  Transfusion for both blood and platelets ordered.  Patient states oral lesions are improving with the addition of Magic mouthwash added yesterday    Brief History:     69-year-old patient admitted through the ER initially for evaluation of fatigue with low-grade fever.  Was sent by his PCP office.  Upon presentation was found to have profound pancytopenia.  Bone marrow biopsy completed on

## 2024-12-08 NOTE — PROGRESS NOTES
Today's Date: 12/8/2024  Patient Name: Osbaldo Duff  Date of admission: 11/21/2024  2:53 PM  Patient's age: 69 y.o., 1955  Admission Dx: Acute leukemia, in relapse (HCC) [C95.02]  Acute leukemia not having achieved remission (HCC) [C95.00]    Reason for Consult: acute lukemia, sent from office   Requesting Physician: No admitting provider for patient encounter.    CHIEF COMPLAINT:    Chief Complaint   Patient presents with    Abnormal Lab       History Obtained From:  patient and chart  INTERVAL HISTORY:          Interval history:  Day #16  Patient is afebrile and doing quite  Continues to have some diarrhea.  No nausea or vomiting  Received platelet transfusion today  Lip herpetic lesion is a little better than yesterday.  Continue with acyclovir    HISTORY OF PRESENT ILLNESS:    Osbaldo Duff is a 69 y.o. male who is admitted to the hospital on 11/21/2024  for anemia, low hb and acute leukemia diagnosis    Patient was following with Dr. Gallego as outpatient for possible suspected bone marrow pathology including MDS/leukemia.    Bone marrow biopsy was performed on 11/11/2024 and was unclear diagnosis here therefore was sent to [facility] which confirmed the diagnosis of acute erythroid leukemia.    Patient has pancytopenia.  Patient is now sent to ER for further management and likely administration of induction chemotherapy.    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  History was obtained through an .  Patient understand very limited English.  Patient has not seen a doctor for a while.  Was seen by primary care provider

## 2024-12-09 LAB
ABO/RH: NORMAL
ALBUMIN SERPL-MCNC: 3.6 G/DL (ref 3.5–5.2)
ALBUMIN/GLOB SERPL: 1.3 {RATIO} (ref 1–2.5)
ALP SERPL-CCNC: 97 U/L (ref 40–129)
ALT SERPL-CCNC: 32 U/L (ref 10–50)
ANION GAP SERPL CALCULATED.3IONS-SCNC: 10 MMOL/L (ref 9–16)
ANTIBODY SCREEN: NEGATIVE
ARM BAND NUMBER: NORMAL
AST SERPL-CCNC: 14 U/L (ref 10–50)
BILIRUB SERPL-MCNC: 0.7 MG/DL (ref 0–1.2)
BLOOD BANK BLOOD PRODUCT EXPIRATION DATE: NORMAL
BLOOD BANK BLOOD PRODUCT EXPIRATION DATE: NORMAL
BLOOD BANK DISPENSE STATUS: NORMAL
BLOOD BANK DISPENSE STATUS: NORMAL
BLOOD BANK ISBT PRODUCT BLOOD TYPE: 8400
BLOOD BANK ISBT PRODUCT BLOOD TYPE: 9500
BLOOD BANK PRODUCT CODE: NORMAL
BLOOD BANK PRODUCT CODE: NORMAL
BLOOD BANK SAMPLE EXPIRATION: NORMAL
BLOOD BANK UNIT TYPE AND RH: NORMAL
BLOOD BANK UNIT TYPE AND RH: NORMAL
BPU ID: NORMAL
BPU ID: NORMAL
BUN SERPL-MCNC: 17 MG/DL (ref 8–23)
CALCIUM SERPL-MCNC: 8.4 MG/DL (ref 8.6–10.4)
CHLORIDE SERPL-SCNC: 108 MMOL/L (ref 98–107)
CO2 SERPL-SCNC: 20 MMOL/L (ref 20–31)
COMPONENT: NORMAL
COMPONENT: NORMAL
CREAT SERPL-MCNC: 1.1 MG/DL (ref 0.7–1.2)
CROSSMATCH RESULT: NORMAL
ERYTHROCYTE [DISTWIDTH] IN BLOOD BY AUTOMATED COUNT: 13.1 % (ref 11.8–14.4)
GFR, ESTIMATED: 73 ML/MIN/1.73M2
GLUCOSE SERPL-MCNC: 103 MG/DL (ref 74–99)
HCT VFR BLD AUTO: 23.5 % (ref 40.7–50.3)
HGB BLD-MCNC: 7.7 G/DL (ref 13–17)
MCH RBC QN AUTO: 29.8 PG (ref 25.2–33.5)
MCHC RBC AUTO-ENTMCNC: 32.8 G/DL (ref 28.4–34.8)
MCV RBC AUTO: 91.1 FL (ref 82.6–102.9)
NRBC BLD-RTO: 0 PER 100 WBC
PLATELET # BLD AUTO: ABNORMAL K/UL (ref 138–453)
PLATELET, FLUORESCENCE: 10 K/UL (ref 138–453)
PLATELETS.RETICULATED NFR BLD AUTO: 0.7 % (ref 1.1–10.3)
POTASSIUM SERPL-SCNC: 3.7 MMOL/L (ref 3.7–5.3)
PROT SERPL-MCNC: 6.4 G/DL (ref 6.6–8.7)
RBC # BLD AUTO: 2.58 M/UL (ref 4.21–5.77)
SODIUM SERPL-SCNC: 138 MMOL/L (ref 136–145)
TRANSFUSION STATUS: NORMAL
TRANSFUSION STATUS: NORMAL
UNIT DIVISION: 0
UNIT DIVISION: 0
UNIT ISSUE DATE/TIME: NORMAL
UNIT ISSUE DATE/TIME: NORMAL
WBC OTHER # BLD: 0.7 K/UL (ref 3.5–11.3)

## 2024-12-09 PROCEDURE — 99232 SBSQ HOSP IP/OBS MODERATE 35: CPT | Performed by: INTERNAL MEDICINE

## 2024-12-09 PROCEDURE — 99232 SBSQ HOSP IP/OBS MODERATE 35: CPT | Performed by: STUDENT IN AN ORGANIZED HEALTH CARE EDUCATION/TRAINING PROGRAM

## 2024-12-09 PROCEDURE — 36415 COLL VENOUS BLD VENIPUNCTURE: CPT

## 2024-12-09 PROCEDURE — 2580000003 HC RX 258: Performed by: INTERNAL MEDICINE

## 2024-12-09 PROCEDURE — 6370000000 HC RX 637 (ALT 250 FOR IP): Performed by: NURSE PRACTITIONER

## 2024-12-09 PROCEDURE — 2060000000 HC ICU INTERMEDIATE R&B

## 2024-12-09 PROCEDURE — 2580000003 HC RX 258: Performed by: NURSE PRACTITIONER

## 2024-12-09 PROCEDURE — 6370000000 HC RX 637 (ALT 250 FOR IP): Performed by: INTERNAL MEDICINE

## 2024-12-09 PROCEDURE — 80053 COMPREHEN METABOLIC PANEL: CPT

## 2024-12-09 PROCEDURE — 6370000000 HC RX 637 (ALT 250 FOR IP)

## 2024-12-09 PROCEDURE — 6360000002 HC RX W HCPCS: Performed by: INTERNAL MEDICINE

## 2024-12-09 PROCEDURE — 85027 COMPLETE CBC AUTOMATED: CPT

## 2024-12-09 PROCEDURE — 85055 RETICULATED PLATELET ASSAY: CPT

## 2024-12-09 RX ADMIN — SODIUM CHLORIDE, PRESERVATIVE FREE 10 ML: 5 INJECTION INTRAVENOUS at 20:00

## 2024-12-09 RX ADMIN — CEFEPIME 2000 MG: 2 INJECTION, POWDER, FOR SOLUTION INTRAVENOUS at 00:07

## 2024-12-09 RX ADMIN — THERA TABS 1 TABLET: TAB at 09:29

## 2024-12-09 RX ADMIN — METOPROLOL SUCCINATE 25 MG: 25 TABLET, EXTENDED RELEASE ORAL at 09:29

## 2024-12-09 RX ADMIN — CEFEPIME 2000 MG: 2 INJECTION, POWDER, FOR SOLUTION INTRAVENOUS at 12:51

## 2024-12-09 RX ADMIN — ACYCLOVIR 400 MG: 200 CAPSULE ORAL at 20:00

## 2024-12-09 RX ADMIN — SODIUM CHLORIDE, PRESERVATIVE FREE 10 ML: 5 INJECTION INTRAVENOUS at 09:29

## 2024-12-09 RX ADMIN — ACETAMINOPHEN 650 MG: 325 TABLET ORAL at 03:32

## 2024-12-09 RX ADMIN — ACETAMINOPHEN 650 MG: 325 TABLET ORAL at 20:00

## 2024-12-09 RX ADMIN — ACYCLOVIR 400 MG: 200 CAPSULE ORAL at 09:29

## 2024-12-09 RX ADMIN — ACYCLOVIR 400 MG: 200 CAPSULE ORAL at 14:45

## 2024-12-09 NOTE — PROGRESS NOTES
[facility] - Progress Note    Today's Date and Time: 12/9/2024, 9:52 AM    Impression :   E. Coli septicemia-detected 12/1/24  Fever  Acute erythroid leukemia  S/p 7-3 chemotherapy that was initiated on 11/20/24  Pancytopenia  Hypotension   HALLE on CKD II    Recommendations:   Discontinued IV Zosyn on 12-4-24  Cefepime 2 gm IV q 12 hr for E coli septicemia. Tentative stop date 12-11-24.  Acyclovir 400 mg PO TID  Renal dosing  Monitor pancytopenia    Medical Decision Making/Summary/Discussion:12/9/2024     Elements of Medical Decision Making:  Note: I have independently performed the steps listed below as part of the medical decision making and evaluation.   Examined and discussed with patient.  Septicemia, gram-negative  E. coli septicemia 12/1/2024  Fevers  Acute erythroid leukemia  Status post 7-3 chemotherapy started on 11/20/2024  Pancytopenia  Hypotension  HALLE on CKD 2  Labs, medications, radiologic studies were reviewed with personal review of films  Radiologic studies  Lab work: Pancytopenia  Cultures: E. coli septicemia  Large amounts of data were reviewed  Please see history of present illness  and daily progress note below  Discussed with nursing Staff, Discharge planner  Dr Kate's team. Critical care residents  Infection Control and Prevention measures reviewed  Universal precaution  Contact isolation  All prior entries were reviewed  Administer medications as ordered  Zosyn D/C  On cefepime  Prognosis:   Guarded  Discharge planning reviewed  Follow up as outpatient.      Maicol Puckett MD     Infection Control Recommendations   [place] Precautions  Neutropenic precautions    Antimicrobial Stewardship Recommendations     Simplification of therapy  Targeted therapy  Renal dosing    Coordination of Outpatient Care:   Estimated Length of IV antimicrobials:TBD  Patient will need Midline Catheter Insertion: TBD  Patient will need PICC line Insertion:TBD  Patient  GROWTH 5 DAYS              Medical Decision Making-Other:     Note:      Thank you for allowing us to participate in the care of this patient. Please call with questions.    NOEMI TANNER          ATTESTATION:    I have discussed the case, including pertinent history and exam findings with the medical resident. I have evaluated the  History, physical findings and pictures of the patient and the key elements of the encounter have been performed by me. I have reviewed the laboratory data, other diagnostic studies and discussed them with the medical resident. I have updated the medical record where necessary.    I agree with the assessment, plan and orders as documented by the medical resident and I have modified them as necessary.         Maicol Puckett MD.        12/9/2024

## 2024-12-09 NOTE — PLAN OF CARE
Problem: Infection - Adult  Goal: Absence of infection at discharge  12/9/2024 0416 by Leonela Weinberg RN  Outcome: Progressing  12/8/2024 1643 by Ivanna Barba RN  Outcome: Progressing     Problem: Hematologic - Adult  Goal: Maintains hematologic stability  12/9/2024 0416 by Leonela Weinberg RN  Outcome: Progressing  12/8/2024 1643 by Ivanna Barba RN  Outcome: Progressing     Problem: Safety - Adult  Goal: Free from fall injury  12/9/2024 0416 by Leonela Weinberg RN  Outcome: Progressing  12/8/2024 1643 by Ivanna Barba RN  Outcome: Progressing     Problem: Nutrition Deficit:  Goal: Optimize nutritional status  Outcome: Progressing     Problem: Discharge Planning  Goal: Discharge to home or other facility with appropriate resources  Outcome: Progressing     Problem: ABCDS Injury Assessment  Goal: Absence of physical injury  12/9/2024 0416 by Leonela Weinberg RN  Outcome: Progressing  12/8/2024 1643 by Ivanna Barba RN  Outcome: Progressing     Problem: Pain  Goal: Verbalizes/displays adequate comfort level or baseline comfort level  12/9/2024 0416 by Leonela Weinberg RN  Outcome: Progressing  12/8/2024 1643 by Ivanna Barba RN  Outcome: Progressing

## 2024-12-09 NOTE — PROGRESS NOTES
VAT consulted to evaluate PICC.  Per nursing unable to flush or draw from PICC.  VAT to bedside to assess.  Other than small amount of dry blood site looks clean, dry and intact.  No complaints of pain or discomfort from patient in regard to PICC.  VAT powerflushed PICC and was able to return blood. Primary RN updated.  Oziel Peres RN

## 2024-12-09 NOTE — PLAN OF CARE
Problem: Infection - Adult  Goal: Absence of infection at discharge  12/9/2024 1619 by Ani Hunter RN  Outcome: Progressing  12/9/2024 0416 by Leonela Weinberg RN  Outcome: Progressing     Problem: Hematologic - Adult  Goal: Maintains hematologic stability  12/9/2024 1619 by Ani Hunter RN  Outcome: Progressing  12/9/2024 0416 by Leonela Weinberg RN  Outcome: Progressing     Problem: Safety - Adult  Goal: Free from fall injury  12/9/2024 1619 by Ani Hunter RN  Outcome: Progressing  12/9/2024 0416 by Leonela Weinberg RN  Outcome: Progressing     Problem: Nutrition Deficit:  Goal: Optimize nutritional status  12/9/2024 1619 by Ani Hunter RN  Outcome: Progressing  12/9/2024 0416 by Leonela Weinberg RN  Outcome: Progressing     Problem: Discharge Planning  Goal: Discharge to home or other facility with appropriate resources  12/9/2024 1619 by Ani Hunter RN  Outcome: Progressing  12/9/2024 0416 by Leonela Weinberg RN  Outcome: Progressing     Problem: ABCDS Injury Assessment  Goal: Absence of physical injury  12/9/2024 1619 by Ani Hunter RN  Outcome: Progressing  12/9/2024 0416 by Leonela Weinberg RN  Outcome: Progressing     Problem: Pain  Goal: Verbalizes/displays adequate comfort level or baseline comfort level  12/9/2024 1619 by Ani Hunter RN  Outcome: Progressing  12/9/2024 0416 by Leonela Weinberg RN  Outcome: Progressing

## 2024-12-09 NOTE — PROGRESS NOTES
[facility]  Office: [phone]  Carlos Ramírez DO, Jerry Washington DO, Keo Esquivel DO, Matthieu Steven DO, Janet Barr MD, Ivy Rome MD, Kimberlyn Jenkins MD, Deborah Dunbar MD,  Nick Garcia MD, Carlton Roth MD, Hellen Segura MD,  Cherry Dee DO, Carlos Doe MD, Cullen Damon MD, Alban Ramírez DO, Carol Wyatt MD,  Andrew Plata DO, Juanis Lees MD, Hiu Laughlin MD, Mira Gee MD, Sonia Escobedo MD,  Gil Wheat MD, Chi Bang MD, Refugio Elliott MD, Livia Hinojosa MD, Sumit Micehl MD, Mika Benjamin MD, Presley Hughes DO, Billy Pederson MD, Shirley Waterhouse, CNP,  Corin Kennedy CNP, Presley Billy, JIMENEZ,  Sia Hancock, BESSY, Grace Romo, CNP, Nicole Vargas, CNP, Carolynn Mckinney, CNP, Clau Russell, CNP, Faye Siu PA-C, GÉNESIS UpC, Mandy Juárez, CNP, Mc Woo, CNP,  Lupe Ramirez, CNP, Valeria Tai, CNP,  Niru Man, CNP, Janessa Carter, CNP         [facility]   IN-PATIENT SERVICE   [facility]    Progress Note    12/9/2024    7:53 AM    Name:   Osbaldo Duff  MRN:     [id number]     Acct:      [id number]   Room:   [facility]0439-01   Day:  18  Admit Date:  11/21/2024  2:53 PM    PCP:   No primary care provider on file.  Code Status:  Full Code    Subjective:     C/C:   Chief Complaint   Patient presents with    Abnormal Lab     Interval History Status: not changed.     Vitals reviewed, afebrile and hemodynamically stable.  Saturating well on room air  Labs reviewed, leukopenia 0.7, anemia 7.7 but stable, platelets are 10 from 18 yesterday.  Overnight patient had no significant events.    On examination patient resting comfortably sitting up at bedside eating lunch.  Complains of oral pain otherwise denies any complaints.  Hematology/oncology following for chemotherapy.  Transfuse for hemoglobin less than 7.0 platelets less than 10.  Patient using Orajel as well as Magic mouthwash for oral

## 2024-12-10 ENCOUNTER — APPOINTMENT (OUTPATIENT)
Dept: CT IMAGING | Age: 69
DRG: 834 | End: 2024-12-10
Payer: COMMERCIAL

## 2024-12-10 LAB
ALBUMIN SERPL-MCNC: 3.4 G/DL (ref 3.5–5.2)
ALBUMIN/GLOB SERPL: 1.1 {RATIO} (ref 1–2.5)
ALP SERPL-CCNC: 103 U/L (ref 40–129)
ALT SERPL-CCNC: 29 U/L (ref 10–50)
ANION GAP SERPL CALCULATED.3IONS-SCNC: 10 MMOL/L (ref 9–16)
AST SERPL-CCNC: 9 U/L (ref 10–50)
BILIRUB SERPL-MCNC: 0.5 MG/DL (ref 0–1.2)
BUN SERPL-MCNC: 15 MG/DL (ref 8–23)
CALCIUM SERPL-MCNC: 8.2 MG/DL (ref 8.6–10.4)
CHLORIDE SERPL-SCNC: 106 MMOL/L (ref 98–107)
CO2 SERPL-SCNC: 19 MMOL/L (ref 20–31)
CREAT SERPL-MCNC: 0.9 MG/DL (ref 0.7–1.2)
ERYTHROCYTE [DISTWIDTH] IN BLOOD BY AUTOMATED COUNT: 12.8 % (ref 11.8–14.4)
ERYTHROCYTE [DISTWIDTH] IN BLOOD BY AUTOMATED COUNT: 12.9 % (ref 11.8–14.4)
GFR, ESTIMATED: >90 ML/MIN/1.73M2
GLUCOSE SERPL-MCNC: 96 MG/DL (ref 74–99)
HCT VFR BLD AUTO: 21.5 % (ref 40.7–50.3)
HCT VFR BLD AUTO: 23 % (ref 40.7–50.3)
HGB BLD-MCNC: 7 G/DL (ref 13–17)
HGB BLD-MCNC: 7.4 G/DL (ref 13–17)
MCH RBC QN AUTO: 29.2 PG (ref 25.2–33.5)
MCH RBC QN AUTO: 29.8 PG (ref 25.2–33.5)
MCHC RBC AUTO-ENTMCNC: 32.2 G/DL (ref 28.4–34.8)
MCHC RBC AUTO-ENTMCNC: 32.6 G/DL (ref 28.4–34.8)
MCV RBC AUTO: 89.6 FL (ref 82.6–102.9)
MCV RBC AUTO: 92.7 FL (ref 82.6–102.9)
NRBC BLD-RTO: 0 PER 100 WBC
NRBC BLD-RTO: 0 PER 100 WBC
PLATELET # BLD AUTO: ABNORMAL K/UL (ref 138–453)
PLATELET # BLD AUTO: ABNORMAL K/UL (ref 138–453)
PLATELET, FLUORESCENCE: 4 K/UL (ref 138–453)
PLATELET, FLUORESCENCE: 9 K/UL (ref 138–453)
PLATELETS.RETICULATED NFR BLD AUTO: 2.1 % (ref 1.1–10.3)
PLATELETS.RETICULATED NFR BLD AUTO: 3.7 % (ref 1.1–10.3)
POTASSIUM SERPL-SCNC: 3.8 MMOL/L (ref 3.7–5.3)
PROT SERPL-MCNC: 6.5 G/DL (ref 6.6–8.7)
RBC # BLD AUTO: 2.4 M/UL (ref 4.21–5.77)
RBC # BLD AUTO: 2.48 M/UL (ref 4.21–5.77)
SODIUM SERPL-SCNC: 135 MMOL/L (ref 136–145)
WBC OTHER # BLD: 0.4 K/UL (ref 3.5–11.3)
WBC OTHER # BLD: 0.6 K/UL (ref 3.5–11.3)

## 2024-12-10 PROCEDURE — 99232 SBSQ HOSP IP/OBS MODERATE 35: CPT | Performed by: STUDENT IN AN ORGANIZED HEALTH CARE EDUCATION/TRAINING PROGRAM

## 2024-12-10 PROCEDURE — 88280 CHROMOSOME KARYOTYPE STUDY: CPT

## 2024-12-10 PROCEDURE — 80053 COMPREHEN METABOLIC PANEL: CPT

## 2024-12-10 PROCEDURE — 88237 TISSUE CULTURE BONE MARROW: CPT

## 2024-12-10 PROCEDURE — 99232 SBSQ HOSP IP/OBS MODERATE 35: CPT | Performed by: INTERNAL MEDICINE

## 2024-12-10 PROCEDURE — 6360000002 HC RX W HCPCS: Performed by: RADIOLOGY

## 2024-12-10 PROCEDURE — 2580000003 HC RX 258: Performed by: INTERNAL MEDICINE

## 2024-12-10 PROCEDURE — 6370000000 HC RX 637 (ALT 250 FOR IP): Performed by: INTERNAL MEDICINE

## 2024-12-10 PROCEDURE — 88311 DECALCIFY TISSUE: CPT

## 2024-12-10 PROCEDURE — 6370000000 HC RX 637 (ALT 250 FOR IP): Performed by: NURSE PRACTITIONER

## 2024-12-10 PROCEDURE — 88313 SPECIAL STAINS GROUP 2: CPT

## 2024-12-10 PROCEDURE — 88184 FLOWCYTOMETRY/ TC 1 MARKER: CPT

## 2024-12-10 PROCEDURE — 88185 FLOWCYTOMETRY/TC ADD-ON: CPT

## 2024-12-10 PROCEDURE — 88342 IMHCHEM/IMCYTCHM 1ST ANTB: CPT

## 2024-12-10 PROCEDURE — 88264 CHROMOSOME ANALYSIS 20-25: CPT

## 2024-12-10 PROCEDURE — 07DR3ZX EXTRACTION OF ILIAC BONE MARROW, PERCUTANEOUS APPROACH, DIAGNOSTIC: ICD-10-PCS | Performed by: RADIOLOGY

## 2024-12-10 PROCEDURE — 2709999900 CT BIOPSY BONE MARROW

## 2024-12-10 PROCEDURE — 079T3ZX DRAINAGE OF BONE MARROW, PERCUTANEOUS APPROACH, DIAGNOSTIC: ICD-10-PCS | Performed by: RADIOLOGY

## 2024-12-10 PROCEDURE — P9073 PLATELETS PHERESIS PATH REDU: HCPCS

## 2024-12-10 PROCEDURE — 36415 COLL VENOUS BLD VENIPUNCTURE: CPT

## 2024-12-10 PROCEDURE — 88305 TISSUE EXAM BY PATHOLOGIST: CPT

## 2024-12-10 PROCEDURE — 85055 RETICULATED PLATELET ASSAY: CPT

## 2024-12-10 PROCEDURE — 2060000000 HC ICU INTERMEDIATE R&B

## 2024-12-10 PROCEDURE — 88360 TUMOR IMMUNOHISTOCHEM/MANUAL: CPT

## 2024-12-10 PROCEDURE — 36430 TRANSFUSION BLD/BLD COMPNT: CPT

## 2024-12-10 PROCEDURE — 2580000003 HC RX 258: Performed by: NURSE PRACTITIONER

## 2024-12-10 PROCEDURE — 6370000000 HC RX 637 (ALT 250 FOR IP)

## 2024-12-10 PROCEDURE — 85027 COMPLETE CBC AUTOMATED: CPT

## 2024-12-10 PROCEDURE — 88341 IMHCHEM/IMCYTCHM EA ADD ANTB: CPT

## 2024-12-10 PROCEDURE — 6360000002 HC RX W HCPCS: Performed by: INTERNAL MEDICINE

## 2024-12-10 RX ORDER — FENTANYL CITRATE 50 UG/ML
INJECTION, SOLUTION INTRAMUSCULAR; INTRAVENOUS PRN
Status: COMPLETED | OUTPATIENT
Start: 2024-12-10 | End: 2024-12-10

## 2024-12-10 RX ORDER — SODIUM CHLORIDE 9 MG/ML
INJECTION, SOLUTION INTRAVENOUS PRN
Status: DISCONTINUED | OUTPATIENT
Start: 2024-12-10 | End: 2024-12-11

## 2024-12-10 RX ADMIN — SODIUM CHLORIDE, PRESERVATIVE FREE 10 ML: 5 INJECTION INTRAVENOUS at 20:50

## 2024-12-10 RX ADMIN — THERA TABS 1 TABLET: TAB at 09:16

## 2024-12-10 RX ADMIN — SODIUM CHLORIDE, PRESERVATIVE FREE 10 ML: 5 INJECTION INTRAVENOUS at 09:17

## 2024-12-10 RX ADMIN — CEFEPIME 2000 MG: 2 INJECTION, POWDER, FOR SOLUTION INTRAVENOUS at 00:17

## 2024-12-10 RX ADMIN — FENTANYL CITRATE 50 MCG: 50 INJECTION, SOLUTION INTRAMUSCULAR; INTRAVENOUS at 10:02

## 2024-12-10 RX ADMIN — ACETAMINOPHEN 325 MG: 325 TABLET ORAL at 20:49

## 2024-12-10 RX ADMIN — FENTANYL CITRATE 50 MCG: 50 INJECTION, SOLUTION INTRAMUSCULAR; INTRAVENOUS at 10:29

## 2024-12-10 RX ADMIN — ACYCLOVIR 400 MG: 200 CAPSULE ORAL at 09:16

## 2024-12-10 RX ADMIN — ACYCLOVIR 400 MG: 200 CAPSULE ORAL at 12:59

## 2024-12-10 RX ADMIN — SODIUM CHLORIDE, PRESERVATIVE FREE 10 ML: 5 INJECTION INTRAVENOUS at 09:16

## 2024-12-10 RX ADMIN — METOPROLOL SUCCINATE 25 MG: 25 TABLET, EXTENDED RELEASE ORAL at 09:16

## 2024-12-10 RX ADMIN — ACYCLOVIR 400 MG: 200 CAPSULE ORAL at 20:49

## 2024-12-10 RX ADMIN — CEFEPIME 2000 MG: 2 INJECTION, POWDER, FOR SOLUTION INTRAVENOUS at 12:21

## 2024-12-10 RX ADMIN — SODIUM CHLORIDE, PRESERVATIVE FREE 10 ML: 5 INJECTION INTRAVENOUS at 20:49

## 2024-12-10 NOTE — OR NURSING
Dr. Llanos unable to obtain bone marrow specimen from either side  Pathology aware  Band aids placed over both attempted sites

## 2024-12-10 NOTE — PLAN OF CARE
Problem: Infection - Adult  Goal: Absence of infection at discharge  12/10/2024 1554 by Renee Ambriz RN  Outcome: Progressing  12/10/2024 0325 by Joel Kelly RN  Outcome: Progressing     Problem: Hematologic - Adult  Goal: Maintains hematologic stability  12/10/2024 1554 by Renee Ambriz RN  Outcome: Progressing  12/10/2024 0325 by Joel Kelly RN  Outcome: Progressing     Problem: Safety - Adult  Goal: Free from fall injury  12/10/2024 1554 by Renee Ambriz RN  Outcome: Progressing  12/10/2024 0325 by Joel Kelly RN  Outcome: Progressing     Problem: Nutrition Deficit:  Goal: Optimize nutritional status  12/10/2024 1554 by Renee Ambriz RN  Outcome: Progressing  12/10/2024 0325 by Joel Kelly RN  Outcome: Progressing     Problem: Discharge Planning  Goal: Discharge to home or other facility with appropriate resources  12/10/2024 1554 by Renee Ambriz RN  Outcome: Progressing  12/10/2024 0325 by Joel Kelly RN  Outcome: Progressing     Problem: ABCDS Injury Assessment  Goal: Absence of physical injury  12/10/2024 1554 by Renee Ambriz RN  Outcome: Progressing  12/10/2024 0325 by Joel Kelly RN  Outcome: Progressing     Problem: Pain  Goal: Verbalizes/displays adequate comfort level or baseline comfort level  12/10/2024 1554 by Renee Ambriz RN  Outcome: Progressing  12/10/2024 0325 by Joel Kelly RN  Outcome: Progressing

## 2024-12-10 NOTE — PROGRESS NOTES
[facility] - Progress Note    Today's Date and Time: 12/10/2024, 9:30 AM    Impression :   E. Coli septicemia-detected 12/1/24  Fever  Acute erythroid leukemia  S/p 7-3 chemotherapy that was initiated on 11/20/24  Pancytopenia  Hypotension   HALLE on CKD II    Recommendations:   Discontinued IV Zosyn on 12-4-24.  Cefepime 2 gm IV q 12 hr for E coli septicemia. Tentative stop date 12-11-24.  Acyclovir 400 mg PO TID. Stop date 12-.  Renal dosing  Monitor pancytopenia  Bone marrow biopsy to occur 12-.    Medical Decision Making/Summary/Discussion:12/10/2024     Elements of Medical Decision Making:  Note: I have independently performed the steps listed below as part of the medical decision making and evaluation.   Examined and discussed with patient.  Septicemia, gram-negative  E. coli septicemia 12/1/2024  Fevers  Acute erythroid leukemia  Status post 7-3 chemotherapy started on 11/20/2024  Pancytopenia  Hypotension  HALLE on CKD 2  Labs, medications, radiologic studies were reviewed with personal review of films  Radiologic studies  Lab work: Pancytopenia  Cultures: E. coli septicemia  Large amounts of data were reviewed  Please see history of present illness  and daily progress note below  Discussed with nursing Staff, Discharge planner  Dr Kate's team. Critical care residents  Infection Control and Prevention measures reviewed  Universal precaution  Contact isolation  All prior entries were reviewed  Administer medications as ordered  Zosyn D/C  On cefepime  Prognosis:   Guarded  Discharge planning reviewed  Follow up as outpatient.      Maicol Puckett MD     Infection Control Recommendations   [place] Precautions  Neutropenic precautions    Antimicrobial Stewardship Recommendations     Simplification of therapy  Targeted therapy  Renal dosing    Coordination of Outpatient Care:   Estimated Length of IV antimicrobials:TBD  Patient will need Midline Catheter

## 2024-12-10 NOTE — PROGRESS NOTES
hematology/oncology recommendations.    Brief History:     This a 69-year-old male with a significant past medical history of hypertension, CKD who initially presented emergency room with fatigue and low-grade fevers sent over by his primary care provider.  He was found to be profoundly pancytopenic with bone marrow biopsy completed on 11/11/2024 given unclear diagnosis and was sent for [facility] which confirmed diagnosis of AD at home.  Hematology/oncology was consulted and he was started on chemotherapy inpatient.  Infectious disease was consulted due to neutropenia and E. coli septicemia and his antibiotics were de-escalated to cefepime 2 g every 12 hours with tentative stop date 12/11/2024.    Review of Systems:     Constitutional: Positive for fatigue. Negative for chills, fevers, sweats  Respiratory:  negative for cough, dyspnea on exertion, shortness of breath, wheezing  Cardiovascular:  negative for chest pain, chest pressure/discomfort, lower extremity edema, palpitations  Gastrointestinal:  negative for abdominal pain, constipation, diarrhea, nausea, vomiting  Neurological:  negative for dizziness, headache    Medications:     Allergies:  No Known Allergies    Current Meds:   Scheduled Meds:    ALTEplase  1 mg IntraCATHeter Once    acyclovir  400 mg Oral TID    metoprolol succinate  25 mg Oral Daily    multivitamin  1 tablet Oral Daily    cefepime  2,000 mg IntraVENous Q12H    sodium chloride  1,000 mL IntraVENous Once    sodium chloride flush  5-40 mL IntraVENous 2 times per day    sodium chloride flush  5-40 mL IntraVENous 2 times per day    lidocaine 1 % injection  50 mg IntraDERmal Once     Continuous Infusions:    sodium chloride      sodium chloride      sodium chloride      sodium chloride      sodium chloride Stopped (12/06/24 0443)     PRN Meds: sodium chloride, sodium chloride, magic (miracle) mouthwash, sodium chloride, benzocaine, acetaminophen, ibuprofen, promethazine, LORazepam, sodium chloride  flush, sodium chloride, ondansetron **OR** ondansetron, polyethylene glycol, bisacodyl, sodium chloride flush, sodium chloride    Data:     Past Medical History:   has a past medical history of Acute erythroid leukemia (HCC), Anemia, Thrombocytopenia (HCC), and Wears dentures.    Social History:   reports that he has never smoked. He has never used smokeless tobacco. He reports that he does not currently use alcohol. He reports that he does not use drugs.     Family History:   Family History   Problem Relation Age of Onset    Stroke Mother     Stroke Father        Vitals:  /68   Pulse 92   Temp 99 °F (37.2 °C) (Oral)   Resp 18   Ht 1.8 m (5' 10.87\")   Wt 81.6 kg (180 lb)   SpO2 98%   BMI 25.20 kg/m²   Temp (24hrs), Av.3 °F (37.4 °C), Min:98.7 °F (37.1 °C), Max:100.2 °F (37.9 °C)    Recent Labs     24  0801   POCGLU 97       I/O (24Hr):  No intake or output data in the 24 hours ending 12/10/24 0850      Labs:  Hematology:  Recent Labs     24  1609 24  0808 12/10/24  0734   WBC 0.6* 0.7* 0.6*   RBC 2.59* 2.58* 2.48*   HGB 7.6* 7.7* 7.4*   HCT 23.5* 23.5* 23.0*   MCV 90.7 91.1 92.7   MCH 29.3 29.8 29.8   MCHC 32.3 32.8 32.2   RDW 13.1 13.1 12.9   PLT See Reflexed IPF Result See Reflexed IPF Result See Reflexed IPF Result     Chemistry:  Recent Labs     24  0640 24  0808 12/10/24  0734    138 135*   K 3.9 3.7 3.8    108* 106   CO2 19* 20 19*   GLUCOSE 96 103* 96   BUN 19 17 15   CREATININE 1.0 1.1 0.9   ANIONGAP 11 10 10   LABGLOM 82 73 >90   CALCIUM 8.5* 8.4* 8.2*     Recent Labs     24  0640 24  0801 24  0808 12/10/24  0734   AST 10  --  14 9*   ALT 25  --  32 29   ALKPHOS 88  --  97 103   BILITOT 0.5  --  0.7 0.5   POCGLU  --  97  --   --      ABG:  Lab Results   Component Value Date/Time    POCPH 7.474 2024 12:16 AM    POCPCO2 19.0 2024 12:16 AM    POCPO2 70.6 2024 12:16 AM    POCHCO3 14.0 2024 12:16 AM    NBEA 8.7

## 2024-12-10 NOTE — BRIEF OP NOTE
Brief Postoperative Note for Bone Marrow Biopsy    Osbaldo Duff  YOB: 1955  [id number]    Pre-operative Diagnosis: Thrombocytopenia, Acute Leukemia     Post-operative Diagnosis: Same     Procedure: Bilateral posterior iliac bone marrow aspiration with and without heparin & biopsy     Anesthesia: 1% lidocaine and Fentanyl     Surgeons/Assistants: Dr. Llanos; Shaila Cooper PA-C     Estimated Blood Loss: Minimal       Complications: None     Specimens Were Obtained: from the Bilateral posterior iliac spine using an Arrow Oncontrol Device 11 g x 4 in. Only 3 cc was collected without heparin during the first attempt along the left posterior iliac spine. Specimens were received by hem/onc at the time of collection. No spicules upon visualization.  Multiple attempts along both sides of pelvis yielding no significant marrow or bone samples. Vital signs were reviewed and were stable after the procedure. Patient tolerated procedure well.    Electronically signed by Shaila Cooper PA-C on 12/10/2024 at 10:47 AM

## 2024-12-10 NOTE — PROGRESS NOTES
alcohol. He reports that he does not use drugs.     Family History: family history includes Stroke in his father and mother.    REVIEW OF SYSTEMS:    Constitutional: No fever or chills. No night sweats, no weight loss   Eyes: No eye discharge, double vision, or eye pain   HEENT: negative for sore mouth, sore throat, hoarseness and voice change   Respiratory: negative for cough , sputum, dyspnea, wheezing, hemoptysis, chest pain   Cardiovascular: negative for chest pain, dyspnea, palpitations, orthopnea, PND   Gastrointestinal: negative for nausea, vomiting, diarrhea, constipation, abdominal pain, Dysphagia, hematemesis and hematochezia   Genitourinary: negative for frequency, dysuria, nocturia, urinary incontinence, and hematuria   Integument: negative for rash, skin lesions, bruises.   Hematologic/Lymphatic: negative for easy bruising, bleeding, lymphadenopathy, or petechiae   Endocrine: negative for heat or cold intolerance,weight changes, change in bowel habits and hair loss   Musculoskeletal: negative for myalgias, arthralgias, pain, joint swelling,and bone pain   Neurological: negative for headaches, dizziness, seizures, weakness, numbness    PHYSICAL EXAM:      /73   Pulse 82   Temp 98.9 °F (37.2 °C) (Oral)   Resp 17   Ht 1.8 m (5' 10.87\")   Wt 81.6 kg (180 lb)   SpO2 100%   BMI 25.20 kg/m²    Temp (24hrs), Av.2 °F (37.3 °C), Min:97.3 °F (36.3 °C), Max:100.3 °F (37.9 °C)    General appearance - well appearing, no in pain or distress   Mental status - alert and cooperative   Eyes - pupils equal and reactive, extraocular eye movements intact   Ears - bilateral TM's and external ear canals normal   Mouth - mucous membranes moist, pharynx normal without lesions   Neck - supple, no significant adenopathy   Lymphatics - no palpable lymphadenopathy, no hepatosplenomegaly   Chest - clear to auscultation, no wheezes, rales or rhonchi, symmetric air entry   Heart - normal rate, regular rhythm, normal  T p53 mutation.  Overall high risk category  Antibiotics per infectious disease team  Check CBC daily.  Neutropenic precautions  Transfuse PRBC if hemoglobin less than 7 and transfuse platelets if less than 10K as per leukemia treatment protocol.  Monitor bone marrow recovery.  Plans for bone marrow aspiration and biopsy.  The patient continues to have 0.7 white count daily but most of them are lymphocytes.  We are going to watch over the next couple of days but likely to do the bone marrow aspiration biopsy on Tuesday  Patient remains seriously ill  Herptic lesion, continue acyclovir, seems to be better  Will follow while in house      Discussed with patient and family and Nurse.        Cherry Cerrato MD                     This note is created with the assistance of a speech recognition program.  While intending to generate a document that actually reflects the content of the visit, the document can still have some errors including those of syntax and sound a like substitutions which may escape proof reading.  It such instances, actual meaning can be extrapolated by contextual diversion.

## 2024-12-10 NOTE — PLAN OF CARE
Problem: Infection - Adult  Goal: Absence of infection at discharge  12/10/2024 0325 by Joel Kelly RN  Outcome: Progressing  12/10/2024 0325 by Joel Kelly RN  Outcome: Progressing  12/9/2024 1619 by Ani Hunter RN  Outcome: Progressing     Problem: Hematologic - Adult  Goal: Maintains hematologic stability  12/10/2024 0325 by Joel Kelly RN  Outcome: Progressing  12/10/2024 0325 by Joel Kelly RN  Outcome: Progressing  12/9/2024 1619 by Ani Hunter RN  Outcome: Progressing     Problem: Safety - Adult  Goal: Free from fall injury  12/10/2024 0325 by Joel Kelly RN  Outcome: Progressing  12/10/2024 0325 by Joel Kelly RN  Outcome: Progressing  12/9/2024 1619 by Ani Hunter RN  Outcome: Progressing     Problem: Nutrition Deficit:  Goal: Optimize nutritional status  12/10/2024 0325 by Joel Kelly RN  Outcome: Progressing  12/10/2024 0325 by Joel Kelly RN  Outcome: Progressing  12/9/2024 1619 by Ani Hunter RN  Outcome: Progressing     Problem: Discharge Planning  Goal: Discharge to home or other facility with appropriate resources  12/10/2024 0325 by Joel Kelly RN  Outcome: Progressing  12/10/2024 0325 by Joel Kelly RN  Outcome: Progressing  12/9/2024 1619 by Ani Hunter RN  Outcome: Progressing     Problem: ABCDS Injury Assessment  Goal: Absence of physical injury  12/10/2024 0325 by Joel Kelly RN  Outcome: Progressing  12/10/2024 0325 by Joel Kelly RN  Outcome: Progressing  12/9/2024 1619 by Ani Hunter RN  Outcome: Progressing     Problem: Pain  Goal: Verbalizes/displays adequate comfort level or baseline comfort level  12/10/2024 0325 by [place], Joel, RN  Outcome: Progressing  12/10/2024 0325 by Joel Kelly RN  Outcome: Progressing  12/9/2024 1619 by Ani Hunter RN  Outcome: Progressing

## 2024-12-11 LAB
ALBUMIN SERPL-MCNC: 3.9 G/DL (ref 3.5–5.2)
ALBUMIN/GLOB SERPL: 1.1 {RATIO} (ref 1–2.5)
ALP SERPL-CCNC: 109 U/L (ref 40–129)
ALT SERPL-CCNC: 30 U/L (ref 10–50)
ANION GAP SERPL CALCULATED.3IONS-SCNC: 14 MMOL/L (ref 9–16)
AST SERPL-CCNC: 12 U/L (ref 10–50)
BILIRUB SERPL-MCNC: 0.6 MG/DL (ref 0–1.2)
BLOOD BANK BLOOD PRODUCT EXPIRATION DATE: NORMAL
BLOOD BANK DISPENSE STATUS: NORMAL
BLOOD BANK ISBT PRODUCT BLOOD TYPE: 7300
BLOOD BANK PRODUCT CODE: NORMAL
BLOOD BANK UNIT TYPE AND RH: NORMAL
BPU ID: NORMAL
BUN SERPL-MCNC: 14 MG/DL (ref 8–23)
CALCIUM SERPL-MCNC: 9 MG/DL (ref 8.6–10.4)
CHLORIDE SERPL-SCNC: 106 MMOL/L (ref 98–107)
CO2 SERPL-SCNC: 20 MMOL/L (ref 20–31)
COMPONENT: NORMAL
CREAT SERPL-MCNC: 1.1 MG/DL (ref 0.7–1.2)
ERYTHROCYTE [DISTWIDTH] IN BLOOD BY AUTOMATED COUNT: 12.8 % (ref 11.8–14.4)
ERYTHROCYTE [DISTWIDTH] IN BLOOD BY AUTOMATED COUNT: 12.8 % (ref 11.8–14.4)
GFR, ESTIMATED: 73 ML/MIN/1.73M2
GLUCOSE SERPL-MCNC: 112 MG/DL (ref 74–99)
HCT VFR BLD AUTO: 22.4 % (ref 40.7–50.3)
HCT VFR BLD AUTO: 24.6 % (ref 40.7–50.3)
HGB BLD-MCNC: 7.4 G/DL (ref 13–17)
HGB BLD-MCNC: 8.1 G/DL (ref 13–17)
MCH RBC QN AUTO: 29.5 PG (ref 25.2–33.5)
MCH RBC QN AUTO: 29.6 PG (ref 25.2–33.5)
MCHC RBC AUTO-ENTMCNC: 32.9 G/DL (ref 28.4–34.8)
MCHC RBC AUTO-ENTMCNC: 33 G/DL (ref 28.4–34.8)
MCV RBC AUTO: 89.2 FL (ref 82.6–102.9)
MCV RBC AUTO: 89.8 FL (ref 82.6–102.9)
NRBC BLD-RTO: 0 PER 100 WBC
NRBC BLD-RTO: 0 PER 100 WBC
PLATELET # BLD AUTO: ABNORMAL K/UL (ref 138–453)
PLATELET # BLD AUTO: ABNORMAL K/UL (ref 138–453)
PLATELET, FLUORESCENCE: 5 K/UL (ref 138–453)
PLATELET, FLUORESCENCE: 7 K/UL (ref 138–453)
PLATELETS.RETICULATED NFR BLD AUTO: 3.4 % (ref 1.1–10.3)
PLATELETS.RETICULATED NFR BLD AUTO: 4.5 % (ref 1.1–10.3)
POTASSIUM SERPL-SCNC: 3.7 MMOL/L (ref 3.7–5.3)
PROT SERPL-MCNC: 7.3 G/DL (ref 6.6–8.7)
RBC # BLD AUTO: 2.51 M/UL (ref 4.21–5.77)
RBC # BLD AUTO: 2.74 M/UL (ref 4.21–5.77)
SODIUM SERPL-SCNC: 140 MMOL/L (ref 136–145)
TRANSFUSION STATUS: NORMAL
UNIT DIVISION: 0
UNIT ISSUE DATE/TIME: NORMAL
WBC OTHER # BLD: 0.3 K/UL (ref 3.5–11.3)
WBC OTHER # BLD: 0.7 K/UL (ref 3.5–11.3)

## 2024-12-11 PROCEDURE — 85025 COMPLETE CBC W/AUTO DIFF WBC: CPT

## 2024-12-11 PROCEDURE — 6370000000 HC RX 637 (ALT 250 FOR IP): Performed by: NURSE PRACTITIONER

## 2024-12-11 PROCEDURE — 36415 COLL VENOUS BLD VENIPUNCTURE: CPT

## 2024-12-11 PROCEDURE — P9073 PLATELETS PHERESIS PATH REDU: HCPCS

## 2024-12-11 PROCEDURE — 85027 COMPLETE CBC AUTOMATED: CPT

## 2024-12-11 PROCEDURE — 99232 SBSQ HOSP IP/OBS MODERATE 35: CPT | Performed by: INTERNAL MEDICINE

## 2024-12-11 PROCEDURE — 6370000000 HC RX 637 (ALT 250 FOR IP): Performed by: INTERNAL MEDICINE

## 2024-12-11 PROCEDURE — 6370000000 HC RX 637 (ALT 250 FOR IP)

## 2024-12-11 PROCEDURE — 2580000003 HC RX 258: Performed by: STUDENT IN AN ORGANIZED HEALTH CARE EDUCATION/TRAINING PROGRAM

## 2024-12-11 PROCEDURE — 2580000003 HC RX 258: Performed by: INTERNAL MEDICINE

## 2024-12-11 PROCEDURE — 99232 SBSQ HOSP IP/OBS MODERATE 35: CPT | Performed by: STUDENT IN AN ORGANIZED HEALTH CARE EDUCATION/TRAINING PROGRAM

## 2024-12-11 PROCEDURE — 2060000000 HC ICU INTERMEDIATE R&B

## 2024-12-11 PROCEDURE — 36430 TRANSFUSION BLD/BLD COMPNT: CPT

## 2024-12-11 PROCEDURE — 80053 COMPREHEN METABOLIC PANEL: CPT

## 2024-12-11 PROCEDURE — 85055 RETICULATED PLATELET ASSAY: CPT

## 2024-12-11 PROCEDURE — 6360000002 HC RX W HCPCS: Performed by: INTERNAL MEDICINE

## 2024-12-11 PROCEDURE — 6360000002 HC RX W HCPCS: Performed by: STUDENT IN AN ORGANIZED HEALTH CARE EDUCATION/TRAINING PROGRAM

## 2024-12-11 RX ORDER — SODIUM CHLORIDE 9 MG/ML
INJECTION, SOLUTION INTRAVENOUS PRN
Status: DISCONTINUED | OUTPATIENT
Start: 2024-12-11 | End: 2024-12-11

## 2024-12-11 RX ORDER — 0.9 % SODIUM CHLORIDE 0.9 %
500 INTRAVENOUS SOLUTION INTRAVENOUS ONCE
Status: COMPLETED | OUTPATIENT
Start: 2024-12-11 | End: 2024-12-11

## 2024-12-11 RX ADMIN — SODIUM CHLORIDE 500 ML: 9 INJECTION, SOLUTION INTRAVENOUS at 14:05

## 2024-12-11 RX ADMIN — CEFEPIME 2000 MG: 2 INJECTION, POWDER, FOR SOLUTION INTRAVENOUS at 00:12

## 2024-12-11 RX ADMIN — CEFEPIME 2000 MG: 2 INJECTION, POWDER, FOR SOLUTION INTRAVENOUS at 09:54

## 2024-12-11 RX ADMIN — ACETAMINOPHEN 325 MG: 325 TABLET ORAL at 14:02

## 2024-12-11 RX ADMIN — ACETAMINOPHEN 650 MG: 325 TABLET ORAL at 08:00

## 2024-12-11 RX ADMIN — ACETAMINOPHEN 325 MG: 325 TABLET ORAL at 20:50

## 2024-12-11 RX ADMIN — ACYCLOVIR 400 MG: 200 CAPSULE ORAL at 08:00

## 2024-12-11 RX ADMIN — ACYCLOVIR 400 MG: 200 CAPSULE ORAL at 13:17

## 2024-12-11 RX ADMIN — CEFEPIME 2000 MG: 2 INJECTION, POWDER, FOR SOLUTION INTRAVENOUS at 16:39

## 2024-12-11 RX ADMIN — SODIUM CHLORIDE, PRESERVATIVE FREE 10 ML: 5 INJECTION INTRAVENOUS at 20:51

## 2024-12-11 RX ADMIN — METOPROLOL SUCCINATE 25 MG: 25 TABLET, EXTENDED RELEASE ORAL at 08:00

## 2024-12-11 RX ADMIN — ACYCLOVIR 400 MG: 200 CAPSULE ORAL at 20:50

## 2024-12-11 RX ADMIN — THERA TABS 1 TABLET: TAB at 08:00

## 2024-12-11 RX ADMIN — SODIUM CHLORIDE, PRESERVATIVE FREE 10 ML: 5 INJECTION INTRAVENOUS at 08:00

## 2024-12-11 NOTE — PROGRESS NOTES
Performed:  BONE MARROW BIOPSY  se     IMAGING DATA:  XR CHEST PORTABLE   Final Result   1. No acute cardiopulmonary process.   2. Left upper extremity PICC terminates in the lower superior vena cava.         CT BIOPSY BONE MARROW    (Results Pending)       Primary Problem  Acute erythroid leukemia (HCC)    Active Hospital Problems    Diagnosis Date Noted    Pancytopenia (HCC) [D61.818] 12/04/2024    E. 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] 11/22/2024    Admission for chemotherapy [Z51.11] 11/22/2024    Acute leukemia not having achieved remission (HCC) [C95.00] 11/21/2024    HTN (hypertension) [I10] 11/21/2024    Thrombocytopenia (HCC) [D69.6] 11/01/2024    Acute anemia [D64.9] 11/01/2024     IMPRESSION:   Acute erythroid leukemia,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  Overall high risk category  Antibiotics per infectious disease team  Check CBC daily.  Pt  developed low grade fever likely due to transfusion   Neutropenic precautions  Transfuse PRBC if hemoglobin less than 7 and transfuse platelets if less than 10K as per leukemia treatment  protocol.  Monitor bone marrow recovery.  Await bone marrow results.   Patient remains seriously ill  Herptic lesion, continue acyclovir, seems to be better  Will follow while in house      Discussed with patient and family and Nurse.        Cherry Cerrato MD                     This note is created with the assistance of a speech recognition program.  While intending to generate a document that actually reflects the content of the visit, the document can still have some errors including those of syntax and sound a like substitutions which may escape proof reading.  It such instances, actual meaning can be extrapolated by contextual diversion.

## 2024-12-11 NOTE — PLAN OF CARE
Problem: Infection - Adult  Goal: Absence of infection at discharge  Outcome: Progressing     Problem: Hematologic - Adult  Goal: Maintains hematologic stability  Outcome: Progressing     Problem: Safety - Adult  Goal: Free from fall injury  Outcome: Progressing     Problem: Nutrition Deficit:  Goal: Optimize nutritional status  Outcome: Progressing     Problem: Discharge Planning  Goal: Discharge to home or other facility with appropriate resources  Outcome: Progressing     Problem: ABCDS Injury Assessment  Goal: Absence of physical injury  Outcome: Progressing     Problem: Pain  Goal: Verbalizes/displays adequate comfort level or baseline comfort level  Outcome: Progressing      [place] Counseling: I discussed with the patient the risks of tildrakizumab including but not limited to immunosuppression, malignancy, posterior leukoencephalopathy syndrome, and serious infections.  The patient understands that monitoring is required including a PPD at baseline and must alert us or the primary physician if symptoms of infection or other concerning signs are noted.

## 2024-12-11 NOTE — PROGRESS NOTES
Comprehensive Nutrition Assessment    Type and Reason for Visit:  Reassess    Nutrition Recommendations/Plan:   Continue diet as tolerated  Modify ONS to Clear ONS once a day, frozen ONS once a day.     Malnutrition Assessment:  Malnutrition Status:  At risk for malnutrition (11/27/24 1244)    Context:  Acute Illness (?)       Nutrition Assessment:    Pt with acute erythroid leukemia, chemotherapy tx started IP. Pt transferred to stepdown unit. Noted % meal intakes. Per RN, pt tolerating diet and eating well at meals. Having BMs, no diarrhea reported. Weight gain noted +5.6% over 1 month, will continue to monitor.    Nutrition Related Findings:    Meds/labs reviewed. Noted Magic mouthwash, MVI. Wound Type: None       Current Nutrition Intake & Therapies:    Average Meal Intake: %  Average Supplements Intake: Unable to assess  ADULT DIET; Regular  ADULT ORAL NUTRITION SUPPLEMENT; Breakfast, Dinner; Clear Liquid Oral Supplement  ADULT ORAL NUTRITION SUPPLEMENT; Lunch, Dinner; Frozen Oral Supplement    Anthropometric Measures:  Height: 180 cm (5' 10.87\")  Ideal Body Weight (IBW): 171 lbs (78 kg)    Admission Body Weight: 80.5 kg (177 lb 7.5 oz)  Current Body Weight: 81.6 kg (179 lb 14.3 oz), 102.4 % IBW. Weight Source: Bed scale  Current BMI (kg/m2): 25.2  Usual Body Weight: 81.9 kg (180 lb 8.9 oz) (11/1/24)  % Weight Change (Calculated): -5.9  BMI Categories: Overweight (BMI 25.0-29.9)    Estimated Daily Nutrient Needs:  Energy Requirements Based On: Kcal/kg  Weight Used for Energy Requirements: Current  Energy (kcal/day): 6498-9768 kcals/day  Weight Used for Protein Requirements: Current  Protein (g/day): 85-95 gm/day  Method Used for Fluid Requirements: ml/Kg  Fluid (ml/day): 25 mL/kg/d = ~1900 mL/day or per MD    Nutrition Diagnosis:   Inadequate oral intake related to inadequate protein-energy intake as evidenced by intake 26-50%, diarrhea (poor appetite per pt report) [RESOLVED]    Nutrition

## 2024-12-11 NOTE — PROGRESS NOTES
[facility]  Office: [phone]  Carlos Ramírez DO, Jerry Washington DO, Keo Esquivel DO, Matthieu Steven DO, Janet Barr MD, Ivy Rome MD, Kimberlyn Jenkins MD, Deborah Dunbar MD,  Nick Garcia MD, Carlton Roth MD, Hellen Segura MD,  Cherry Dee DO, Carlos Doe MD, Cullen Damon MD, Alban Ramírez DO, Carol Wyatt MD,  Andrew Plata DO, Juanis Lees MD, Hui Laughlin MD, Mira Gee MD, Sonia Escobedo MD,  Gil Wheat MD, Chi Bang MD, Refugio Elliott MD, Livia Hinojosa MD, Sumit Michel MD, Mika Benjamin MD, Presley Hughes DO, Billy Pederson MD, Shirley Waterhouse, CNP,  Corin Kennedy CNP, Presley Billy, JIMENEZ,  Sia Hancock, BESSY, Grace Romo, CNP, Nicole Vargas, CNP, Carolynn Mckinney, CNP, Clau Russell, CNP, Faye Siu PA-C, Elena Rosen PA-C, Mandy Juárez, JIMENEZ, Mc Woo, CNP,  Lupe Ramirez, CNP, Valeria Tai, CNP,  Niru Man, CNP, Janessa Carter, CNP         [facility]   IN-PATIENT SERVICE   [facility]    Progress Note    12/11/2024    8:32 AM    Name:   Osbaldo Duff  MRN:     [id number]     Acct:      [id number]   Room:   [facility]90439-01   Day:  20  Admit Date:  11/21/2024  2:53 PM    PCP:   No primary care provider on file.  Code Status:  Full Code    Subjective:     C/C:   Chief Complaint   Patient presents with    Abnormal Lab     Interval History Status: not changed.     Vitals reviewed, febrile Tmax 101 otherwise hemodynamically stable.  Saturating well on room air.  Labs reviewed, leukopenia 0.7 but improving, hemoglobin stable 8.1, platelets again 7.  Overnight patient's platelets again 9 after repeat however no further transfusion overnight?    On examination patient resting comfortably in bed.  Admits to feeling feverish and chilly but otherwise denies any chest pain, shortness of breath, abdominal pain, nausea, vomiting, diarrhea, constipation, urinary symptoms.  Status post bone

## 2024-12-11 NOTE — PROGRESS NOTES
[facility] - Progress Note    Today's Date and Time: 12/11/2024, 8:10 AM    Impression :   E. Coli septicemia-detected 12/1/24  Fever  Acute erythroid leukemia  S/p 7-3 chemotherapy that was initiated on 11/20/24  Pancytopenia  Hypotension   HALLE on CKD II    Recommendations:   Discontinued IV Zosyn on 12-4-24.  Cefepime 2 gm IV q 12 hr for E coli septicemia. Tentative stop date 12-18-24.  Continued d/t being febrile 12-. New stop date 12-.  Acyclovir 400 mg PO TID. Stop date 12-.  Renal dosing  Monitor pancytopenia  Bone marrow biopsy occurred on 12-. Awaiting results.    Medical Decision Making/Summary/Discussion:12/11/2024     Elements of Medical Decision Making:  Note: I have independently performed the steps listed below as part of the medical decision making and evaluation.   Examined and discussed with patient.  Septicemia, gram-negative  E. coli septicemia 12/1/2024  Fevers  Acute erythroid leukemia  Status post 7-3 chemotherapy started on 11/20/2024  Pancytopenia  Hypotension  HALLE on CKD 2  Labs, medications, radiologic studies were reviewed with personal review of films  Radiologic studies  Lab work: Pancytopenia  Cultures: E. coli septicemia  Large amounts of data were reviewed  Please see history of present illness  and daily progress note below  Discussed with nursing Staff, Discharge planner  Dr Kate's team. Critical care residents  Infection Control and Prevention measures reviewed  Universal precaution  Contact isolation  All prior entries were reviewed  Administer medications as ordered  Zosyn D/C  On cefepime  Prognosis:   Guarded  Discharge planning reviewed  Follow up as outpatient.      Maicol Puckett MD     Infection Control Recommendations   [place] Precautions  Neutropenic precautions    Antimicrobial Stewardship Recommendations     Simplification of therapy  Targeted therapy  Renal dosing    Coordination of Outpatient  Detected     Human Metapneumovirus PCR Not Detected     Rhino/Enterovirus PCR Not Detected     Influenza A by PCR Not Detected     Influenza B by PCR Not Detected     Parainfluenza 1 PCR Not Detected     Parainfluenza 2 PCR Not Detected     Parainfluenza 3 PCR Not Detected     Parainfluenza 4 PCR Not Detected     Resp Syncytial Virus PCR Not Detected     Bordetella parapertussis by PCR Not Detected     B Pertussis by PCR Not Detected     Chlamydia pneumoniae By PCR Not Detected     Mycoplasma pneumo by PCR Not Detected     Comment: Performed by multiplexed nucleic acid assay.       Culture, Respiratory [[id number]]     Order Status: Canceled Specimen: Sputum Expectorated     MRSA DNA Probe, Nasal [[id number]] Collected: 12/02/24 0705    Order Status: Canceled Specimen: Nares     Culture, Blood 2 [[id number]]  (Abnormal)  (Susceptibility) Collected: 12/01/24 2106    Order Status: Completed Specimen: Blood Updated: 12/04/24 0110     Specimen Description .BLOOD     Special Requests R HAND .05ML     Culture POSITIVE Blood Culture      DIRECT GRAM STAIN FROM BOTTLE: GRAM NEGATIVE RODS      Escherichia coli Detected: Methodology- Polymerase Chain Reaction (PCR)      ESCHERICHIA COLI      (NOTE) Direct Gram Stain from bottle and Polymerase Chain Reaction (PCR) results called to and read back by:BENJA PURDY  ON [id number]    Susceptibility        Escherichia coli      BACTERIAL SUSCEPTIBILITY PANEL OCTAVIO      ampicillin <=2  Sensitive      ceFAZolin <=4  Sensitive      cefTRIAXone <=0.25  Sensitive      Confirmatory Extended Spectrum Beta-Lactamase NEGATIVE  Sensitive      gentamicin <=1  Sensitive      levofloxacin <=0.12  Sensitive      piperacillin-tazobactam <=4  Sensitive      tobramycin <=1  Sensitive      trimethoprim-sulfamethoxazole <=20  Sensitive                           Culture, Blood 1 [[id number]] Collected: 12/01/24 2103    Order Status: Completed Specimen: Blood Updated: 12/06/24 2209     Specimen Description

## 2024-12-11 NOTE — PROGRESS NOTES
Today's Date: 12/10/2024  Patient Name: Osbaldo Duff  Date of admission: 11/21/2024  2:53 PM  Patient's age: 69 y.o., 1955  Admission Dx: Acute leukemia, in relapse (HCC) [C95.02]  Acute leukemia not having achieved remission (HCC) [C95.00]    Reason for Consult: acute lukemia, sent from office   Requesting Physician: No admitting provider for patient encounter.    CHIEF COMPLAINT:    Chief Complaint   Patient presents with    Abnormal Lab       History Obtained From:  patient and chart  INTERVAL HISTORY:          Interval history:  Day #18  Patient is afebrile and doing quite  Continues to have some diarrhea.  No nausea or vomiting  Bone marrow  done today   Lip herpetic lesion is a healing     HISTORY OF PRESENT ILLNESS:    Osbaldo Duff is a 69 y.o. male who is admitted to the hospital on 11/21/2024  for anemia, low hb and acute leukemia diagnosis    Patient was following with Dr. Gallego as outpatient for possible suspected bone marrow pathology including MDS/leukemia.    Bone marrow biopsy was performed on 11/11/2024 and was unclear diagnosis here therefore was sent to [facility] which confirmed the diagnosis of acute erythroid leukemia.    Patient has pancytopenia.  Patient is now sent to ER for further management and likely administration of induction chemotherapy.    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  Anticipating venetoclax and Vidaza     Summary of Case/History:  Osbaldo Duff a 69 y.o.male is a patient who admitted to the hospital due to abnormal lab workup.  History was obtained through an .  Patient understand very limited English.  Patient has not seen a doctor for a while.  Was seen by primary care provider due to complaints of fatigue also having low-grade fever.

## 2024-12-12 ENCOUNTER — APPOINTMENT (OUTPATIENT)
Dept: GENERAL RADIOLOGY | Age: 69
DRG: 834 | End: 2024-12-12
Payer: COMMERCIAL

## 2024-12-12 LAB
ALBUMIN SERPL-MCNC: 3.2 G/DL (ref 3.5–5.2)
ALBUMIN/GLOB SERPL: 1 {RATIO} (ref 1–2.5)
ALP SERPL-CCNC: 94 U/L (ref 40–129)
ALT SERPL-CCNC: 25 U/L (ref 10–50)
ANION GAP SERPL CALCULATED.3IONS-SCNC: 10 MMOL/L (ref 9–16)
AST SERPL-CCNC: 10 U/L (ref 10–50)
BILIRUB SERPL-MCNC: 0.4 MG/DL (ref 0–1.2)
BLOOD BANK BLOOD PRODUCT EXPIRATION DATE: NORMAL
BLOOD BANK DISPENSE STATUS: NORMAL
BLOOD BANK ISBT PRODUCT BLOOD TYPE: 6200
BLOOD BANK PRODUCT CODE: NORMAL
BLOOD BANK UNIT TYPE AND RH: NORMAL
BPU ID: NORMAL
BUN SERPL-MCNC: 12 MG/DL (ref 8–23)
CALCIUM SERPL-MCNC: 8.3 MG/DL (ref 8.6–10.4)
CHLORIDE SERPL-SCNC: 104 MMOL/L (ref 98–107)
CO2 SERPL-SCNC: 20 MMOL/L (ref 20–31)
COMPONENT: NORMAL
CREAT SERPL-MCNC: 1.1 MG/DL (ref 0.7–1.2)
ERYTHROCYTE [DISTWIDTH] IN BLOOD BY AUTOMATED COUNT: 12.5 % (ref 11.8–14.4)
ERYTHROCYTE [DISTWIDTH] IN BLOOD BY AUTOMATED COUNT: 12.6 % (ref 11.8–14.4)
GFR, ESTIMATED: 73 ML/MIN/1.73M2
GLUCOSE SERPL-MCNC: 119 MG/DL (ref 74–99)
HCT VFR BLD AUTO: 22.3 % (ref 40.7–50.3)
HCT VFR BLD AUTO: 23.2 % (ref 40.7–50.3)
HGB BLD-MCNC: 7.2 G/DL (ref 13–17)
HGB BLD-MCNC: 7.4 G/DL (ref 13–17)
MCH RBC QN AUTO: 29 PG (ref 25.2–33.5)
MCH RBC QN AUTO: 29.4 PG (ref 25.2–33.5)
MCHC RBC AUTO-ENTMCNC: 31.9 G/DL (ref 28.4–34.8)
MCHC RBC AUTO-ENTMCNC: 32.3 G/DL (ref 28.4–34.8)
MCV RBC AUTO: 89.9 FL (ref 82.6–102.9)
MCV RBC AUTO: 92.1 FL (ref 82.6–102.9)
NRBC BLD-RTO: 0 PER 100 WBC
NRBC BLD-RTO: 0 PER 100 WBC
PLATELET # BLD AUTO: ABNORMAL K/UL (ref 138–453)
PLATELET # BLD AUTO: ABNORMAL K/UL (ref 138–453)
PLATELET, FLUORESCENCE: 16 K/UL (ref 138–453)
PLATELET, FLUORESCENCE: 4 K/UL (ref 138–453)
PLATELETS.RETICULATED NFR BLD AUTO: 2.7 % (ref 1.1–10.3)
PLATELETS.RETICULATED NFR BLD AUTO: 5 % (ref 1.1–10.3)
POTASSIUM SERPL-SCNC: 3.7 MMOL/L (ref 3.7–5.3)
PROT SERPL-MCNC: 6.5 G/DL (ref 6.6–8.7)
RBC # BLD AUTO: 2.48 M/UL (ref 4.21–5.77)
RBC # BLD AUTO: 2.52 M/UL (ref 4.21–5.77)
SODIUM SERPL-SCNC: 134 MMOL/L (ref 136–145)
TRANSFUSION STATUS: NORMAL
UNIT DIVISION: 0
UNIT ISSUE DATE/TIME: NORMAL
WBC OTHER # BLD: 0.3 K/UL (ref 3.5–11.3)
WBC OTHER # BLD: 0.5 K/UL (ref 3.5–11.3)

## 2024-12-12 PROCEDURE — 6370000000 HC RX 637 (ALT 250 FOR IP)

## 2024-12-12 PROCEDURE — 36430 TRANSFUSION BLD/BLD COMPNT: CPT

## 2024-12-12 PROCEDURE — P9073 PLATELETS PHERESIS PATH REDU: HCPCS

## 2024-12-12 PROCEDURE — 6360000002 HC RX W HCPCS: Performed by: STUDENT IN AN ORGANIZED HEALTH CARE EDUCATION/TRAINING PROGRAM

## 2024-12-12 PROCEDURE — 86923 COMPATIBILITY TEST ELECTRIC: CPT

## 2024-12-12 PROCEDURE — 6360000002 HC RX W HCPCS: Performed by: INTERNAL MEDICINE

## 2024-12-12 PROCEDURE — 6370000000 HC RX 637 (ALT 250 FOR IP): Performed by: NURSE PRACTITIONER

## 2024-12-12 PROCEDURE — 80053 COMPREHEN METABOLIC PANEL: CPT

## 2024-12-12 PROCEDURE — 99232 SBSQ HOSP IP/OBS MODERATE 35: CPT | Performed by: INTERNAL MEDICINE

## 2024-12-12 PROCEDURE — 36415 COLL VENOUS BLD VENIPUNCTURE: CPT

## 2024-12-12 PROCEDURE — 86900 BLOOD TYPING SEROLOGIC ABO: CPT

## 2024-12-12 PROCEDURE — 71045 X-RAY EXAM CHEST 1 VIEW: CPT

## 2024-12-12 PROCEDURE — 99233 SBSQ HOSP IP/OBS HIGH 50: CPT | Performed by: INTERNAL MEDICINE

## 2024-12-12 PROCEDURE — 2580000003 HC RX 258: Performed by: INTERNAL MEDICINE

## 2024-12-12 PROCEDURE — 2580000003 HC RX 258: Performed by: STUDENT IN AN ORGANIZED HEALTH CARE EDUCATION/TRAINING PROGRAM

## 2024-12-12 PROCEDURE — 99232 SBSQ HOSP IP/OBS MODERATE 35: CPT | Performed by: STUDENT IN AN ORGANIZED HEALTH CARE EDUCATION/TRAINING PROGRAM

## 2024-12-12 PROCEDURE — 2060000000 HC ICU INTERMEDIATE R&B

## 2024-12-12 PROCEDURE — 85027 COMPLETE CBC AUTOMATED: CPT

## 2024-12-12 PROCEDURE — 85055 RETICULATED PLATELET ASSAY: CPT

## 2024-12-12 PROCEDURE — 86901 BLOOD TYPING SEROLOGIC RH(D): CPT

## 2024-12-12 PROCEDURE — 2580000003 HC RX 258: Performed by: NURSE PRACTITIONER

## 2024-12-12 PROCEDURE — 86850 RBC ANTIBODY SCREEN: CPT

## 2024-12-12 PROCEDURE — 87040 BLOOD CULTURE FOR BACTERIA: CPT

## 2024-12-12 PROCEDURE — 6370000000 HC RX 637 (ALT 250 FOR IP): Performed by: INTERNAL MEDICINE

## 2024-12-12 RX ORDER — SODIUM CHLORIDE 9 MG/ML
INJECTION, SOLUTION INTRAVENOUS PRN
Status: DISCONTINUED | OUTPATIENT
Start: 2024-12-12 | End: 2024-12-18 | Stop reason: HOSPADM

## 2024-12-12 RX ADMIN — Medication 5 ML: at 22:02

## 2024-12-12 RX ADMIN — SODIUM CHLORIDE, PRESERVATIVE FREE 10 ML: 5 INJECTION INTRAVENOUS at 09:34

## 2024-12-12 RX ADMIN — METOPROLOL SUCCINATE 25 MG: 25 TABLET, EXTENDED RELEASE ORAL at 09:27

## 2024-12-12 RX ADMIN — SODIUM CHLORIDE, PRESERVATIVE FREE 10 ML: 5 INJECTION INTRAVENOUS at 09:33

## 2024-12-12 RX ADMIN — SODIUM CHLORIDE, PRESERVATIVE FREE 10 ML: 5 INJECTION INTRAVENOUS at 22:04

## 2024-12-12 RX ADMIN — ACYCLOVIR 400 MG: 200 CAPSULE ORAL at 09:27

## 2024-12-12 RX ADMIN — Medication 5 ML: at 16:39

## 2024-12-12 RX ADMIN — VANCOMYCIN HYDROCHLORIDE 1000 MG: 1 INJECTION, POWDER, LYOPHILIZED, FOR SOLUTION INTRAVENOUS at 22:11

## 2024-12-12 RX ADMIN — CEFEPIME 2000 MG: 2 INJECTION, POWDER, FOR SOLUTION INTRAVENOUS at 09:33

## 2024-12-12 RX ADMIN — THERA TABS 1 TABLET: TAB at 09:27

## 2024-12-12 RX ADMIN — ACETAMINOPHEN 650 MG: 325 TABLET ORAL at 22:02

## 2024-12-12 RX ADMIN — CEFEPIME 2000 MG: 2 INJECTION, POWDER, FOR SOLUTION INTRAVENOUS at 00:57

## 2024-12-12 RX ADMIN — ACYCLOVIR 400 MG: 200 CAPSULE ORAL at 15:26

## 2024-12-12 RX ADMIN — ACETAMINOPHEN 325 MG: 325 TABLET ORAL at 04:16

## 2024-12-12 RX ADMIN — BENZOCAINE: 0.1 GEL TOPICAL at 22:02

## 2024-12-12 RX ADMIN — BENZOCAINE: 0.1 GEL TOPICAL at 16:38

## 2024-12-12 NOTE — PROGRESS NOTES
[facility]  Office: [phone]  Carlos Ramírez DO, Jerry Washington DO, Keo Esquivel DO, Matthieu Steven DO, Janet Barr MD, Ivy Rome MD, Kimberlyn Jenkins MD, Deborah Dunbar MD,  Nick Garcia MD, Carlton Roth MD, Hellen Segura MD,  Cherry Dee DO, Carlos Doe MD, Cullen Damon MD, Alban Ramírez DO, Carol Wyatt MD,  Andrew Plata DO, Juanis Lees MD, Hui Laughlin MD, Mira Gee MD, Sonia Escobedo MD,  Gil Wheat MD, Chi Bang MD, Refugio Elliott MD, Livia Hinojosa MD, Sumit Michel MD, Mika Benjamin MD, Presley Hughes DO, Billy Pederson MD, Shirley Waterhouse, CNP,  Corin Kennedy CNP, Presley Billy, JIMENEZ,  Sia Hancock, BESSY, Grace Romo, CNP, Nicole Vargas, CNP, Carolynn Mckinney, CNP, Clau Russell, CNP, Faye Siu PA-C, Elena Rosen PA-C, Mandy Juárez, JIMENEZ, Mc Woo, CNP,  Lupe Ramirez, CNP, Valeria Tai, CNP,  Niru Man, CNP, Janessa Carter, CNP         [facility]   IN-PATIENT SERVICE   [facility]    Progress Note    12/12/2024    8:20 AM    Name:   Osbaldo Duff  MRN:     [id number]     Acct:      [id number]   Room:   [facility]/0439-01   Day:  21  Admit Date:  11/21/2024  2:53 PM    PCP:   No primary care provider on file.  Code Status:  Full Code    Subjective:     C/C:   Chief Complaint   Patient presents with    Abnormal Lab     Interval History Status: not changed.     Vitals reviewed, febrile Tmax 102.7 otherwise hemodynamically stable.  Saturating well on room air.  Labs reviewed, leukopenia 0.5, hemoglobin stable 7.4, platelets again 4.  Overnight patient's platelets again 7 given 1 unit of platelets.     On examination patient resting comfortably in bed. No fevers, chills, shortness of breath, cough, pain with urination on exam. Resting comfortably.  Persistently more febrile.  Will check septic workup with blood cultures, urinalysis with urine culture and check chest x-ray given

## 2024-12-12 NOTE — PROGRESS NOTES
[facility] - Progress Note    Today's Date and Time: 12/12/2024, 9:04 AM    Impression :   E. Coli septicemia-detected 12/1/24  Fever  Acute erythroid leukemia  S/p 7-3 chemotherapy that was initiated on 11/20/24  Pancytopenia  Hypotension   HALLE on CKD II    Recommendations:   Discontinued IV Zosyn on 12-4-24.  Cefepime 2 gm IV q 8 hrs for E coli septicemia. Tentative stop date 12-18-24.  Added Vancomycin 1 gm q 12 hr because of breakthrough fevers  Acyclovir 400 mg PO TID. Stop date 12-.  Renal dosing  Monitor pancytopenia  Bone marrow biopsy occurred on 12-. Awaiting results.  12-: Repeat blood and urine cultures pending    Medical Decision Making/Summary/Discussion:12/12/2024     Elements of Medical Decision Making:  Note: I have independently performed the steps listed below as part of the medical decision making and evaluation.   Examined and discussed with patient.  Septicemia, gram-negative  E. coli septicemia 12/1/2024  Fevers  Acute erythroid leukemia  Status post 7-3 chemotherapy started on 11/20/2024  Pancytopenia  Hypotension  HALLE on CKD 2  Labs, medications, radiologic studies were reviewed with personal review of films  Radiologic studies  Lab work: Pancytopenia  Cultures: E. coli septicemia  Large amounts of data were reviewed  Please see history of present illness  and daily progress note below  Discussed with nursing Staff, Discharge planner  Dr Kate's team. Critical care residents  Infection Control and Prevention measures reviewed  Universal precaution  Contact isolation  All prior entries were reviewed  Administer medications as ordered  Zosyn D/C  On cefepime  Prognosis:   Guarded  Discharge planning reviewed  Follow up as outpatient.      Maicol Puckett MD     Infection Control Recommendations   [place] Precautions  Neutropenic precautions    Antimicrobial Stewardship Recommendations     Simplification of therapy  Targeted  multiplexed nucleic acid assay.       Culture, Respiratory [[id number]]     Order Status: Canceled Specimen: Sputum Expectorated     MRSA DNA Probe, Nasal [[id number]] Collected: 12/02/24 0705    Order Status: Canceled Specimen: Nares     Culture, Blood 2 [[id number]]  (Abnormal)  (Susceptibility) Collected: 12/01/24 2106    Order Status: Completed Specimen: Blood Updated: 12/04/24 0110     Specimen Description .BLOOD     Special Requests R HAND .05ML     Culture POSITIVE Blood Culture      DIRECT GRAM STAIN FROM BOTTLE: GRAM NEGATIVE RODS      Escherichia coli Detected: Methodology- Polymerase Chain Reaction (PCR)      ESCHERICHIA COLI      (NOTE) Direct Gram Stain from bottle and Polymerase Chain Reaction (PCR) results called to and read back by:BENJA PURDY  ON [id number]    Susceptibility        Escherichia coli      BACTERIAL SUSCEPTIBILITY PANEL OCTAVIO      ampicillin <=2  Sensitive      ceFAZolin <=4  Sensitive      cefTRIAXone <=0.25  Sensitive      Confirmatory Extended Spectrum Beta-Lactamase NEGATIVE  Sensitive      gentamicin <=1  Sensitive      levofloxacin <=0.12  Sensitive      piperacillin-tazobactam <=4  Sensitive      tobramycin <=1  Sensitive      trimethoprim-sulfamethoxazole <=20  Sensitive                           Culture, Blood 1 [[id number]] Collected: 12/01/24 2103    Order Status: Completed Specimen: Blood Updated: 12/06/24 2209     Specimen Description .BLOOD     Special Requests R AC 2ML     Culture NO GROWTH 5 DAYS              Medical Decision Making-Other:     Note:      Thank you for allowing us to participate in the care of this patient. Please call with questions.    NOEMI TANNER          ATTESTATION:    I have discussed the case, including pertinent history and exam findings with the medical resident. I have evaluated the  History, physical findings and pictures of the patient and the key elements of the encounter have been performed by me. I have reviewed the laboratory data, other

## 2024-12-12 NOTE — PLAN OF CARE
Problem: Infection - Adult  Goal: Absence of infection at discharge  Outcome: Progressing     Problem: Hematologic - Adult  Goal: Maintains hematologic stability  Outcome: Progressing     Problem: Safety - Adult  Goal: Free from fall injury  Outcome: Progressing     Problem: Nutrition Deficit:  Goal: Optimize nutritional status  12/11/2024 2205 by Wilbert Enrique RN  Outcome: Progressing     Problem: Discharge Planning  Goal: Discharge to home or other facility with appropriate resources  Outcome: Progressing     Problem: ABCDS Injury Assessment  Goal: Absence of physical injury  Outcome: Progressing     Problem: Pain  Goal: Verbalizes/displays adequate comfort level or baseline comfort level  Outcome: Progressing

## 2024-12-12 NOTE — CONSULTS
Consult for DL LUE PICC no blood return both lumens, white lumen infusing at time of eval, cap change and flush pink lumen, flushes freely unable to aspirate blood. Site asymptomatic with 2 cm external and matches insertion note. CXR this AM with PICC tip SVC, consider CathFlo administration if no contraindication to restore brisk blood return.

## 2024-12-13 LAB
ALBUMIN SERPL-MCNC: 3 G/DL (ref 3.5–5.2)
ALBUMIN/GLOB SERPL: 0.9 {RATIO} (ref 1–2.5)
ALP SERPL-CCNC: 80 U/L (ref 40–129)
ALT SERPL-CCNC: 24 U/L (ref 10–50)
ANION GAP SERPL CALCULATED.3IONS-SCNC: 11 MMOL/L (ref 9–16)
AST SERPL-CCNC: 8 U/L (ref 10–50)
BILIRUB SERPL-MCNC: 0.4 MG/DL (ref 0–1.2)
BLOOD BANK BLOOD PRODUCT EXPIRATION DATE: NORMAL
BLOOD BANK BLOOD PRODUCT EXPIRATION DATE: NORMAL
BLOOD BANK DISPENSE STATUS: NORMAL
BLOOD BANK DISPENSE STATUS: NORMAL
BLOOD BANK ISBT PRODUCT BLOOD TYPE: 5100
BLOOD BANK ISBT PRODUCT BLOOD TYPE: 7300
BLOOD BANK PRODUCT CODE: NORMAL
BLOOD BANK PRODUCT CODE: NORMAL
BLOOD BANK UNIT TYPE AND RH: NORMAL
BLOOD BANK UNIT TYPE AND RH: NORMAL
BONE MARROW REPORT: NORMAL
BPU ID: NORMAL
BPU ID: NORMAL
BUN SERPL-MCNC: 14 MG/DL (ref 8–23)
CALCIUM SERPL-MCNC: 7.8 MG/DL (ref 8.6–10.4)
CHLORIDE SERPL-SCNC: 103 MMOL/L (ref 98–107)
CO2 SERPL-SCNC: 20 MMOL/L (ref 20–31)
COMPONENT: NORMAL
COMPONENT: NORMAL
CREAT SERPL-MCNC: 1.1 MG/DL (ref 0.7–1.2)
ERYTHROCYTE [DISTWIDTH] IN BLOOD BY AUTOMATED COUNT: 12.6 % (ref 11.8–14.4)
GFR, ESTIMATED: 73 ML/MIN/1.73M2
GLUCOSE SERPL-MCNC: 121 MG/DL (ref 74–99)
HCT VFR BLD AUTO: 22.8 % (ref 40.7–50.3)
HGB BLD-MCNC: 7.6 G/DL (ref 13–17)
MAGNESIUM SERPL-MCNC: 1.9 MG/DL (ref 1.6–2.4)
MCH RBC QN AUTO: 29.6 PG (ref 25.2–33.5)
MCHC RBC AUTO-ENTMCNC: 33.3 G/DL (ref 28.4–34.8)
MCV RBC AUTO: 88.7 FL (ref 82.6–102.9)
NRBC BLD-RTO: 0 PER 100 WBC
PLATELET # BLD AUTO: ABNORMAL K/UL (ref 138–453)
PLATELET, FLUORESCENCE: 8 K/UL (ref 138–453)
PLATELETS.RETICULATED NFR BLD AUTO: 3.9 % (ref 1.1–10.3)
POTASSIUM SERPL-SCNC: 3.4 MMOL/L (ref 3.7–5.3)
PROT SERPL-MCNC: 6.5 G/DL (ref 6.6–8.7)
RBC # BLD AUTO: 2.57 M/UL (ref 4.21–5.77)
SODIUM SERPL-SCNC: 134 MMOL/L (ref 136–145)
TRANSFUSION STATUS: NORMAL
TRANSFUSION STATUS: NORMAL
UNIT DIVISION: 0
UNIT DIVISION: 0
UNIT ISSUE DATE/TIME: NORMAL
UNIT ISSUE DATE/TIME: NORMAL
WBC OTHER # BLD: 0.5 K/UL (ref 3.5–11.3)

## 2024-12-13 PROCEDURE — 6370000000 HC RX 637 (ALT 250 FOR IP): Performed by: NURSE PRACTITIONER

## 2024-12-13 PROCEDURE — 36430 TRANSFUSION BLD/BLD COMPNT: CPT

## 2024-12-13 PROCEDURE — 2580000003 HC RX 258: Performed by: INTERNAL MEDICINE

## 2024-12-13 PROCEDURE — 2580000003 HC RX 258: Performed by: STUDENT IN AN ORGANIZED HEALTH CARE EDUCATION/TRAINING PROGRAM

## 2024-12-13 PROCEDURE — 6370000000 HC RX 637 (ALT 250 FOR IP)

## 2024-12-13 PROCEDURE — 85027 COMPLETE CBC AUTOMATED: CPT

## 2024-12-13 PROCEDURE — 80053 COMPREHEN METABOLIC PANEL: CPT

## 2024-12-13 PROCEDURE — 6360000002 HC RX W HCPCS: Performed by: STUDENT IN AN ORGANIZED HEALTH CARE EDUCATION/TRAINING PROGRAM

## 2024-12-13 PROCEDURE — 85055 RETICULATED PLATELET ASSAY: CPT

## 2024-12-13 PROCEDURE — 99232 SBSQ HOSP IP/OBS MODERATE 35: CPT | Performed by: INTERNAL MEDICINE

## 2024-12-13 PROCEDURE — 6360000002 HC RX W HCPCS: Performed by: INTERNAL MEDICINE

## 2024-12-13 PROCEDURE — 36415 COLL VENOUS BLD VENIPUNCTURE: CPT

## 2024-12-13 PROCEDURE — 99232 SBSQ HOSP IP/OBS MODERATE 35: CPT | Performed by: STUDENT IN AN ORGANIZED HEALTH CARE EDUCATION/TRAINING PROGRAM

## 2024-12-13 PROCEDURE — P9073 PLATELETS PHERESIS PATH REDU: HCPCS

## 2024-12-13 PROCEDURE — 2580000003 HC RX 258: Performed by: NURSE PRACTITIONER

## 2024-12-13 PROCEDURE — 83735 ASSAY OF MAGNESIUM: CPT

## 2024-12-13 PROCEDURE — 2060000000 HC ICU INTERMEDIATE R&B

## 2024-12-13 RX ORDER — POTASSIUM CHLORIDE 7.45 MG/ML
10 INJECTION INTRAVENOUS
Status: COMPLETED | OUTPATIENT
Start: 2024-12-13 | End: 2024-12-13

## 2024-12-13 RX ORDER — SODIUM CHLORIDE 9 MG/ML
INJECTION, SOLUTION INTRAVENOUS PRN
Status: DISCONTINUED | OUTPATIENT
Start: 2024-12-13 | End: 2024-12-18 | Stop reason: HOSPADM

## 2024-12-13 RX ADMIN — METOPROLOL SUCCINATE 25 MG: 25 TABLET, EXTENDED RELEASE ORAL at 08:04

## 2024-12-13 RX ADMIN — POTASSIUM CHLORIDE 10 MEQ: 7.46 INJECTION, SOLUTION INTRAVENOUS at 15:07

## 2024-12-13 RX ADMIN — ALTEPLASE 1 MG: 2.2 INJECTION, POWDER, LYOPHILIZED, FOR SOLUTION INTRAVENOUS at 00:20

## 2024-12-13 RX ADMIN — Medication 5 ML: at 22:01

## 2024-12-13 RX ADMIN — CEFEPIME 2000 MG: 2 INJECTION, POWDER, FOR SOLUTION INTRAVENOUS at 08:06

## 2024-12-13 RX ADMIN — ACETAMINOPHEN 650 MG: 325 TABLET ORAL at 23:51

## 2024-12-13 RX ADMIN — VANCOMYCIN HYDROCHLORIDE 1000 MG: 1 INJECTION, POWDER, LYOPHILIZED, FOR SOLUTION INTRAVENOUS at 22:13

## 2024-12-13 RX ADMIN — SODIUM CHLORIDE, PRESERVATIVE FREE 10 ML: 5 INJECTION INTRAVENOUS at 22:03

## 2024-12-13 RX ADMIN — VANCOMYCIN HYDROCHLORIDE 1000 MG: 1 INJECTION, POWDER, LYOPHILIZED, FOR SOLUTION INTRAVENOUS at 10:33

## 2024-12-13 RX ADMIN — AZTREONAM 1000 MG: 1 INJECTION, POWDER, LYOPHILIZED, FOR SOLUTION INTRAMUSCULAR; INTRAVENOUS at 17:02

## 2024-12-13 RX ADMIN — CEFEPIME 2000 MG: 2 INJECTION, POWDER, FOR SOLUTION INTRAVENOUS at 00:19

## 2024-12-13 RX ADMIN — Medication 5 ML: at 13:38

## 2024-12-13 RX ADMIN — SODIUM CHLORIDE, PRESERVATIVE FREE 10 ML: 5 INJECTION INTRAVENOUS at 22:02

## 2024-12-13 RX ADMIN — ACETAMINOPHEN 650 MG: 325 TABLET ORAL at 16:33

## 2024-12-13 RX ADMIN — SODIUM CHLORIDE, PRESERVATIVE FREE 10 ML: 5 INJECTION INTRAVENOUS at 08:04

## 2024-12-13 RX ADMIN — ACETAMINOPHEN 650 MG: 325 TABLET ORAL at 08:03

## 2024-12-13 RX ADMIN — POTASSIUM CHLORIDE 10 MEQ: 7.46 INJECTION, SOLUTION INTRAVENOUS at 13:39

## 2024-12-13 RX ADMIN — THERA TABS 1 TABLET: TAB at 08:04

## 2024-12-13 RX ADMIN — POTASSIUM CHLORIDE 10 MEQ: 7.46 INJECTION, SOLUTION INTRAVENOUS at 12:12

## 2024-12-13 ASSESSMENT — PAIN SCALES - GENERAL
PAINLEVEL_OUTOF10: 4

## 2024-12-13 NOTE — PROGRESS NOTES
[facility] - Progress Note    Today's Date and Time: 12/13/2024, 9:02 AM    Impression :   E. Coli septicemia-detected 12/1/24  Fever  Acute erythroid leukemia  S/p 7-3 chemotherapy that was initiated on 11/20/24  Pancytopenia  Hypotension   HALLE on CKD II  Diffuse skin rash : Allergy to cefepime plus petechiae from low platelet counts vs leukemia cutis from ongoing erythroid leukemia    Recommendations:   Discontinued IV Zosyn on 12-4-24.  D/C cefepime 2g IV q 8 hrs because of rash  Start Azactam 2 gm IV q 8 hr. Tentative stop date 12-18-24.  Added Vancomycin 1 gm q 12 hr because of breakthrough fevers although fevers may be transfusion related   Completed Acyclovir 400 mg PO TID. Stop date 12-.  Renal dosing  Monitor pancytopenia  Bone marrow biopsy occurred on 12-. Awaiting results.  12-: Repeat blood and urine cultures pending    Medical Decision Making/Summary/Discussion:12/13/2024     Elements of Medical Decision Making:  Note: I have independently performed the steps listed below as part of the medical decision making and evaluation.   Examined and discussed with patient.  Septicemia, gram-negative  E. coli septicemia 12/1/2024  Fevers  Acute erythroid leukemia  Status post 7-3 chemotherapy started on 11/20/2024  Pancytopenia  Hypotension  HALLE on CKD 2  Labs, medications, radiologic studies were reviewed with personal review of films  Radiologic studies  Lab work: Pancytopenia  Cultures: E. coli septicemia  Large amounts of data were reviewed  Please see history of present illness  and daily progress note below  Discussed with nursing Staff, Discharge planner  Dr Kate's team. Critical care residents  Infection Control and Prevention measures reviewed  Universal precaution  Contact isolation  All prior entries were reviewed  Administer medications as ordered  Zosyn D/C  On cefepime  Prognosis:   Guarded  Discharge planning reviewed  Follow up as  outpatient.      Maicol Puckett MD     Infection Control Recommendations   [place] Precautions  Neutropenic precautions    Antimicrobial Stewardship Recommendations     Simplification of therapy  Targeted therapy  Renal dosing    Coordination of Outpatient Care:   Estimated Length of IV antimicrobials:TBD  Patient will need Midline Catheter Insertion: TBD  Patient will need PICC line Insertion:TBD  Patient will need: Home IV , Infusion Center,  SNF,  LTAC: TBD  Patient will need outpatient wound care: no    Chief complaint/reason for consultation:   E. Coli septicemia      History of Present Illness:   Osbaldo Duff is a 69 y.o.-year-old male who was initially admitted on 11/21/2024. Patient seen at the request of Dr. Kate    INITIAL HISTORY:    This patient was admitted to [facility] on 11/21/2024 for induction of chemotherapy.  He was diagnosed with acute erythroid leukemia s/p bone marrow biopsy that was completed on 11/11/2024.    He has been experiencing pancytopenia, low-grade fevers and fatigue.    His first language is Sammarinese and he speaks very little English.  Prior to this diagnosis, he had not been to the doctor in years and has minimal previous medical history.    Chemotherapy with a 7+3 regiment was initiated on 11/20/2024, per Dr. Gallego, with Heme-Onc.    He has received intermittent transfusions of PRBC and platelets for continued pancytopenia.    The patient developed neutropenic fevers and hypotension, requiring transfer to ICU on 12/2/24.    Blood cultures from 12/1/2024 are growing E. coli.    He received a dose of IV rocephin on 12/1/24, and was initiated on IV vancomycin and Zosyn on 12/2/24. Vancomycin has since been discontinued.    ID was consulted for patient evaluation and antibiotic recommendations    CURRENT EVALUATION- DAILY INTERVAL CHANGES 12/13/2024  /65   Pulse (!) 101   Temp (!) 101.5 °F (38.6 °C) (Temporal)   Resp 20   Ht 1.778 m (5' 10\")   Wt 75 kg (165 lb

## 2024-12-13 NOTE — CARE COORDINATION
Discussed with Pt and family regarding transitional planning. Daughter was provided resouces for SNF and HHC options to review. They would like to wait on deciding any outpatient services until they talk with the physicians about any additional treatment options.                        Post Acute Facility/Agency List     Provided patient with the following list, the list includes the overall star ratings obtained from CMS per the Medicare Web site (www.Medicare.gov):     [] Long Term Acute Care Facilities  [] Acute Inpatient Rehabilitation Facilities  [x] Skilled Nursing Facilities  [] Hospice Facilities  [x] Home Care    Provided verbal instructions on how to utilize the QR Code to obtain additional detailed star ratings from www.Medicare.gov     offered to print and provide the detailed list:    []Accepted   [x]Declined

## 2024-12-13 NOTE — PROGRESS NOTES
[facility]  Office: [phone]  Carlos Ramírez DO, Jerry Washington DO, Keo Esquivel DO, Matthieu Steven DO, Janet Barr MD, Ivy Rome MD, Kimberlyn Jenkins MD, Deborah Dunbar MD,  Nick Garcia MD, Carlton Roth MD, Hellen Segura MD,  Cherry Dee DO, Carlos Doe MD, Cullen Damon MD, Alban Ramírez DO, Carol Wyatt MD,  Andrew Plata DO, Juanis Lees MD, Hui Laughlin MD, Mira Gee MD, Sonia Escobedo MD,  Gil Wheat MD, Chi Bang MD, Refugio Elliott MD, Livia Hinojosa MD, Sumit Michel MD, Mika Benjamin MD, Presley Hughes DO, Billy Pederson MD, Shirley Waterhouse, CNP,  Corin Kennedy CNP, Presley Billy, JIMENEZ,  Sia Hancock, BESSY, Grace Romo, CNP, Nicole Vargas, CNP, Carolynn Mckinney, CNP, Clau Russell, CNP, Faye Siu PA-C, Elena Rosen PA-C, Mandy Juárez, JIMENEZ, Mc Woo, CNP,  Lupe Ramirez, CNP, Valeria Tai, CNP,  Niru Man, CNP, Janessa Carter, CNP         [facility]   IN-PATIENT SERVICE   [facility]    Progress Note    12/13/2024    8:21 AM    Name:   Osbaldo Duff  MRN:     [id number]     Acct:      [id number]   Room:   [facility]9/0439-01   Day:  22  Admit Date:  11/21/2024  2:53 PM    PCP:   No primary care provider on file.  Code Status:  Full Code    Subjective:     C/C:   Chief Complaint   Patient presents with    Abnormal Lab     Interval History Status: not changed.     Vitals reviewed, febrile Tmax 101.8 otherwise hemodynamically stable.  Saturating well on room air.  Labs reviewed, leukopenia 0.5, hemoglobin stable 7.6, platelets 8. Given 1 unit of platelets.   Overnight patient continued to have fevers.     On examination patient resting comfortably in bed.  Complains of fatigue as well as mild pain.  Encouraged to continue use of Magic mouthwash especially prior to meals to help with oral intake.  Complains of new onset rash over the past day on his medial thighs, back, chest and upper

## 2024-12-13 NOTE — PROGRESS NOTES
Today's Date: 12/13/2024  Patient Name: Osbaldo Duff  Date of admission: 11/21/2024  2:53 PM  Patient's age: 69 y.o., 1955  Admission Dx: Acute leukemia, in relapse (HCC) [C95.02]  Acute leukemia not having achieved remission (HCC) [C95.00]    Reason for Consult: acute lukemia, sent from office   Requesting Physician: No admitting provider for patient encounter.    CHIEF COMPLAINT:    Chief Complaint   Patient presents with    Abnormal Lab       History Obtained From:  patient and chart  INTERVAL HISTORY:          Interval history:  Day #21  Patient spiked a fever today.  Infectious disease input appreciated.  Antibiotics changed to as Azactam.   Continues to have some diarrhea.  Mild  No nausea or vomiting  Bone marrow results were reviewed.  Unfortunately has residual disease more than 20 to 30%.  He is not responding well to chemotherapy.    HISTORY OF PRESENT ILLNESS:    Osbaldo Duff is a 69 y.o. male who is admitted to the hospital on 11/21/2024  for anemia, low hb and acute leukemia diagnosis    Patient was following with Dr. Gallego as outpatient for possible suspected bone marrow pathology including MDS/leukemia.    Bone marrow biopsy was performed on 11/11/2024 and was unclear diagnosis here therefore was sent to [facility] which confirmed the diagnosis of acute erythroid leukemia.    Patient has pancytopenia.  Patient is now sent to ER for further management and likely administration of induction chemotherapy.    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  History was obtained through an .  Patient understand very  anisopoikilocytosis.  Adequate leukocytes, with hypogranular forms and rare metamyelocytes  on scanning.    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  See comment.  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  See comment.    Diagnosis Comment  The above diagnosis and following report are rendered following  consultation with Presley Ferro MD, PhD, of [facility] ([id number]).  He also comments,  \"there are numerous proerythroblasts with strong positivity for p53 by  immunohistochemistry.  The aberrant proerythroblasts comprise  approximately 34% of nucleated cells in the hemodilute aspirate smears  and approximately 70% of core biopsy cellularity based on the provided  E-cadherin stain.  There is also dysgranulopoiesis and  dysmegakaryopoiesis.  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  If a TP53 mutation is detected at  >10% variant allele frequency, this leukemia could also be categorized  as \"acute myeloid leukemia with mutated TP53\" based on the  International Consensus Classification.\"  The case was also reviewed  by additional intradepartmental hematopathologists ([facility]).  The  material submitted for flow cytometric immunophenotypic analysis is  negative for increased blasts and shows no specific immunophenotypic  abnormalities (see flow cytometry addendum).  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  Vitamin B12 and folate levels are within normal limits, as are iron  studies (11/1/2024).  Preliminary findings were discussed with Dr. Jack Gallego via [employer] on

## 2024-12-13 NOTE — PROGRESS NOTES
Physical Therapy        Physical Therapy Cancel Note      DATE: 2024    NAME: Osbaldo Duff  MRN: [id number]   : 1955      Patient not seen this date for Physical Therapy due to:    Other: Platelets 8,000 with pt receiving platelet transfusion.  Will check back on .       Electronically signed by Ba Franklin PT on 2024 at 3:59 PM

## 2024-12-13 NOTE — PROGRESS NOTES
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    CHIEF COMPLAINT:    Chief Complaint   Patient presents with    Abnormal Lab       History Obtained From:  patient and chart  INTERVAL HISTORY:          Interval history:  Day #20  Patient is afebrile and doing quite  Continues to have some diarrhea.  No nausea or vomiting  Bone marrow  done Tuesday . Results are pending   Prelim reading does not show ablated marrow   Lip herpetic lesion is a healing     HISTORY OF PRESENT ILLNESS:    Osbaldo Duff is a 69 y.o. male who is admitted to the hospital on 11/21/2024  for anemia, low hb and acute leukemia diagnosis    Patient was following with Dr. Gallego as outpatient for possible suspected bone marrow pathology including MDS/leukemia.    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    Patient has pancytopenia.  Patient is now sent to ER for further management and likely administration of induction chemotherapy.    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  History was obtained through an .  Patient understand very limited English.  Patient has not seen a doctor for a while.  Was seen by primary  via  [employer] on 11/21/2024 at 12:49 PM.  Results of myeloid NGS  testing will follow as an addendum once completed.      AYAAN Velarde  **Electronically Signed Out**        sf/11/14/2024  Clinical Information  Pre-Op Diagnosis:  THROMBOCYTOPENIA; LOW HEMOGLOBIN  Operation Performed:  BONE MARROW BIOPSY  se     IMAGING DATA:  XR CHEST PORTABLE   Final Result   No acute cardiopulmonary process.         XR CHEST PORTABLE   Final Result   1. No acute cardiopulmonary process.   2. Left upper extremity PICC terminates in the lower superior vena cava.         CT BIOPSY BONE MARROW    (Results Pending)       Primary Problem  Acute erythroid leukemia (HCC)    Active Hospital Problems    Diagnosis Date Noted    Pancytopenia (HCC) [D61.818] 12/04/2024    E. 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] 11/22/2024    Admission for chemotherapy [Z51.11] 11/22/2024    Acute leukemia not having achieved remission (HCC) [C95.00] 11/21/2024    HTN (hypertension) [I10] 11/21/2024    Thrombocytopenia (HCC) [D69.6] 11/01/2024    Acute anemia [D64.9] 11/01/2024     IMPRESSION:   Acute erythroid leukemia,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

## 2024-12-14 LAB
ALBUMIN SERPL-MCNC: 2.8 G/DL (ref 3.5–5.2)
ALBUMIN/GLOB SERPL: 0.8 {RATIO} (ref 1–2.5)
ALP SERPL-CCNC: 69 U/L (ref 40–129)
ALT SERPL-CCNC: 23 U/L (ref 10–50)
ANION GAP SERPL CALCULATED.3IONS-SCNC: 10 MMOL/L (ref 9–16)
AST SERPL-CCNC: 9 U/L (ref 10–50)
BILIRUB SERPL-MCNC: 0.3 MG/DL (ref 0–1.2)
BLOOD BANK BLOOD PRODUCT EXPIRATION DATE: NORMAL
BLOOD BANK DISPENSE STATUS: NORMAL
BLOOD BANK DISPENSE STATUS: NORMAL
BLOOD BANK ISBT PRODUCT BLOOD TYPE: 5100
BLOOD BANK PRODUCT CODE: NORMAL
BLOOD BANK UNIT TYPE AND RH: NORMAL
BPU ID: NORMAL
BPU ID: NORMAL
BUN SERPL-MCNC: 17 MG/DL (ref 8–23)
CALCIUM SERPL-MCNC: 7.9 MG/DL (ref 8.6–10.4)
CHLORIDE SERPL-SCNC: 106 MMOL/L (ref 98–107)
CO2 SERPL-SCNC: 19 MMOL/L (ref 20–31)
COMPONENT: NORMAL
COMPONENT: NORMAL
CREAT SERPL-MCNC: 1.3 MG/DL (ref 0.7–1.2)
ERYTHROCYTE [DISTWIDTH] IN BLOOD BY AUTOMATED COUNT: 12.6 % (ref 11.8–14.4)
GFR, ESTIMATED: 60 ML/MIN/1.73M2
GLUCOSE SERPL-MCNC: 104 MG/DL (ref 74–99)
HCT VFR BLD AUTO: 21.9 % (ref 40.7–50.3)
HGB BLD-MCNC: 7.3 G/DL (ref 13–17)
MAGNESIUM SERPL-MCNC: 1.9 MG/DL (ref 1.6–2.4)
MCH RBC QN AUTO: 29.4 PG (ref 25.2–33.5)
MCHC RBC AUTO-ENTMCNC: 33.3 G/DL (ref 28.4–34.8)
MCV RBC AUTO: 88.3 FL (ref 82.6–102.9)
NRBC BLD-RTO: 0 PER 100 WBC
PLATELET # BLD AUTO: ABNORMAL K/UL (ref 138–453)
PLATELET, FLUORESCENCE: 10 K/UL (ref 138–453)
PLATELETS.RETICULATED NFR BLD AUTO: 4.6 % (ref 1.1–10.3)
POTASSIUM SERPL-SCNC: 3.5 MMOL/L (ref 3.7–5.3)
PROT SERPL-MCNC: 6.1 G/DL (ref 6.6–8.7)
RBC # BLD AUTO: 2.48 M/UL (ref 4.21–5.77)
SODIUM SERPL-SCNC: 135 MMOL/L (ref 136–145)
TRANSFUSION STATUS: NORMAL
TRANSFUSION STATUS: NORMAL
UNIT DIVISION: 0
UNIT DIVISION: 0
UNIT ISSUE DATE/TIME: NORMAL
WBC OTHER # BLD: 0.8 K/UL (ref 3.5–11.3)

## 2024-12-14 PROCEDURE — 36415 COLL VENOUS BLD VENIPUNCTURE: CPT

## 2024-12-14 PROCEDURE — 6370000000 HC RX 637 (ALT 250 FOR IP)

## 2024-12-14 PROCEDURE — 99232 SBSQ HOSP IP/OBS MODERATE 35: CPT | Performed by: STUDENT IN AN ORGANIZED HEALTH CARE EDUCATION/TRAINING PROGRAM

## 2024-12-14 PROCEDURE — 85027 COMPLETE CBC AUTOMATED: CPT

## 2024-12-14 PROCEDURE — 2500000003 HC RX 250 WO HCPCS: Performed by: NURSE PRACTITIONER

## 2024-12-14 PROCEDURE — 99232 SBSQ HOSP IP/OBS MODERATE 35: CPT | Performed by: INTERNAL MEDICINE

## 2024-12-14 PROCEDURE — 2060000000 HC ICU INTERMEDIATE R&B

## 2024-12-14 PROCEDURE — 2580000003 HC RX 258: Performed by: NURSE PRACTITIONER

## 2024-12-14 PROCEDURE — 6360000002 HC RX W HCPCS: Performed by: STUDENT IN AN ORGANIZED HEALTH CARE EDUCATION/TRAINING PROGRAM

## 2024-12-14 PROCEDURE — 99233 SBSQ HOSP IP/OBS HIGH 50: CPT | Performed by: INTERNAL MEDICINE

## 2024-12-14 PROCEDURE — 2580000003 HC RX 258: Performed by: INTERNAL MEDICINE

## 2024-12-14 PROCEDURE — 85055 RETICULATED PLATELET ASSAY: CPT

## 2024-12-14 PROCEDURE — 6360000002 HC RX W HCPCS: Performed by: INTERNAL MEDICINE

## 2024-12-14 PROCEDURE — 80053 COMPREHEN METABOLIC PANEL: CPT

## 2024-12-14 PROCEDURE — 2580000003 HC RX 258: Performed by: STUDENT IN AN ORGANIZED HEALTH CARE EDUCATION/TRAINING PROGRAM

## 2024-12-14 PROCEDURE — 83735 ASSAY OF MAGNESIUM: CPT

## 2024-12-14 PROCEDURE — 6370000000 HC RX 637 (ALT 250 FOR IP): Performed by: NURSE PRACTITIONER

## 2024-12-14 RX ORDER — SODIUM CHLORIDE 9 MG/ML
INJECTION, SOLUTION INTRAVENOUS CONTINUOUS
Status: DISCONTINUED | OUTPATIENT
Start: 2024-12-14 | End: 2024-12-18 | Stop reason: HOSPADM

## 2024-12-14 RX ORDER — METOPROLOL TARTRATE 1 MG/ML
5 INJECTION, SOLUTION INTRAVENOUS ONCE
Status: COMPLETED | OUTPATIENT
Start: 2024-12-14 | End: 2024-12-14

## 2024-12-14 RX ORDER — POTASSIUM CHLORIDE 7.45 MG/ML
10 INJECTION INTRAVENOUS
Status: DISCONTINUED | OUTPATIENT
Start: 2024-12-14 | End: 2024-12-14 | Stop reason: SDUPTHER

## 2024-12-14 RX ORDER — POTASSIUM CHLORIDE 7.45 MG/ML
10 INJECTION INTRAVENOUS
Status: COMPLETED | OUTPATIENT
Start: 2024-12-14 | End: 2024-12-14

## 2024-12-14 RX ORDER — MAGNESIUM SULFATE 1 G/100ML
1000 INJECTION INTRAVENOUS ONCE
Status: COMPLETED | OUTPATIENT
Start: 2024-12-14 | End: 2024-12-14

## 2024-12-14 RX ADMIN — AZTREONAM 1000 MG: 1 INJECTION, POWDER, LYOPHILIZED, FOR SOLUTION INTRAMUSCULAR; INTRAVENOUS at 17:17

## 2024-12-14 RX ADMIN — AZTREONAM 1000 MG: 1 INJECTION, POWDER, LYOPHILIZED, FOR SOLUTION INTRAMUSCULAR; INTRAVENOUS at 08:44

## 2024-12-14 RX ADMIN — MAGNESIUM SULFATE HEPTAHYDRATE 1000 MG: 1 INJECTION, SOLUTION INTRAVENOUS at 16:09

## 2024-12-14 RX ADMIN — Medication 5 ML: at 18:31

## 2024-12-14 RX ADMIN — VANCOMYCIN HYDROCHLORIDE 1000 MG: 1 INJECTION, POWDER, LYOPHILIZED, FOR SOLUTION INTRAVENOUS at 23:02

## 2024-12-14 RX ADMIN — POTASSIUM CHLORIDE 10 MEQ: 7.46 INJECTION, SOLUTION INTRAVENOUS at 17:15

## 2024-12-14 RX ADMIN — VANCOMYCIN HYDROCHLORIDE 1000 MG: 1 INJECTION, POWDER, LYOPHILIZED, FOR SOLUTION INTRAVENOUS at 10:11

## 2024-12-14 RX ADMIN — POTASSIUM CHLORIDE 10 MEQ: 10 INJECTION, SOLUTION INTRAVENOUS at 18:27

## 2024-12-14 RX ADMIN — METOPROLOL TARTRATE 5 MG: 5 INJECTION INTRAVENOUS at 01:40

## 2024-12-14 RX ADMIN — METOPROLOL SUCCINATE 25 MG: 25 TABLET, EXTENDED RELEASE ORAL at 08:39

## 2024-12-14 RX ADMIN — POTASSIUM CHLORIDE 10 MEQ: 10 INJECTION, SOLUTION INTRAVENOUS at 22:00

## 2024-12-14 RX ADMIN — SODIUM CHLORIDE, PRESERVATIVE FREE 10 ML: 5 INJECTION INTRAVENOUS at 08:43

## 2024-12-14 RX ADMIN — AZTREONAM 1000 MG: 1 INJECTION, POWDER, LYOPHILIZED, FOR SOLUTION INTRAMUSCULAR; INTRAVENOUS at 00:57

## 2024-12-14 RX ADMIN — POTASSIUM CHLORIDE 10 MEQ: 10 INJECTION, SOLUTION INTRAVENOUS at 20:30

## 2024-12-14 RX ADMIN — THERA TABS 1 TABLET: TAB at 08:39

## 2024-12-14 RX ADMIN — BENZOCAINE: 0.1 GEL TOPICAL at 14:00

## 2024-12-14 RX ADMIN — ACETAMINOPHEN 650 MG: 325 TABLET ORAL at 08:39

## 2024-12-14 RX ADMIN — BENZOCAINE: 0.1 GEL TOPICAL at 08:39

## 2024-12-14 RX ADMIN — SODIUM CHLORIDE: 9 INJECTION, SOLUTION INTRAVENOUS at 08:40

## 2024-12-14 RX ADMIN — Medication 5 ML: at 08:39

## 2024-12-14 ASSESSMENT — PAIN DESCRIPTION - LOCATION
LOCATION: MOUTH
LOCATION: MOUTH

## 2024-12-14 ASSESSMENT — PAIN SCALES - GENERAL
PAINLEVEL_OUTOF10: 3
PAINLEVEL_OUTOF10: 3

## 2024-12-14 ASSESSMENT — PAIN DESCRIPTION - DESCRIPTORS: DESCRIPTORS: SORE

## 2024-12-14 NOTE — PROGRESS NOTES
[facility]  Progress Note    Osbaldo Duff  MRN: [id number]  Date: 12/14/2024  LOS: 23     Reason for F/U :   E. coli sepsis    Impression :   E. coli sepsis 12/1/2024-resolved  Intermittent fever-febrile neutropenia  Acute erythroid leukemia   status post 7 3 chemotherapy initiated 11/20/2024  Pancytopenia related to chemotherapy  Acute kidney injury on chronic kidney disease stage II  Allergic drug rash secondary to cefepime versus leukemia cutis  Diarrhea likely related to recent chemotherapy  Cheilitis/mucositis    Recommendations:   The patient was previously on broad-spectrum antimicrobial therapy with piperacillin/tazobactam and subsequently cefepime   These were discontinued due to concern for allergic drug rash   The patient currently continues on aztreonam and vancomycin 12/18/2024  Completed acyclovir 12/12/2024  Awaiting bone marrow results from biopsy done 12/10/2024    Infection Control Recommendations:   Neutropenic precautions    Discharge Planning:   Estimated Length of IV antimicrobials: To be determined  Patient will need Midline Catheter Insertion/ PICC line Insertion: No  Patient will need: Home IV , Infusion Center,  SNF,  LTAC: Undetermined  Patient willneed outpatient wound care: No    Medical Decision making / Summary of Stay:   Osbaldo Duff is a 69 y.o.-year-old male who was initially admitted on 11/21/2024. Patient seen at the request of Dr. Kate     INITIAL HISTORY:     This patient was admitted to [facility] on 11/21/2024 for induction of chemotherapy.  He was diagnosed with acute erythroid leukemia s/p bone marrow biopsy that was completed on 11/11/2024.     He has been experiencing pancytopenia, low-grade fevers and fatigue.     His first language is [place]i and he speaks very little English.  Prior to this diagnosis, he had not been to the doctor in years and has minimal previous medical history.     Chemotherapy with a 7+3 regiment was initiated on

## 2024-12-14 NOTE — PLAN OF CARE
Problem: Infection - Adult  Goal: Absence of infection at discharge  12/14/2024 0236 by Reina Nguyen RN  Outcome: Progressing  12/13/2024 1749 by Mia Bishop RN  Outcome: Progressing     Problem: Hematologic - Adult  Goal: Maintains hematologic stability  12/14/2024 0236 by Reina Nguyen RN  Outcome: Progressing  12/13/2024 1749 by Mia Bishop RN  Outcome: Progressing     Problem: Safety - Adult  Goal: Free from fall injury  12/14/2024 0236 by Reina Nguyen RN  Outcome: Progressing  12/13/2024 1749 by Mia Bishop RN  Outcome: Progressing     Problem: Nutrition Deficit:  Goal: Optimize nutritional status  12/14/2024 0236 by Reina Nguyen RN  Outcome: Progressing  12/13/2024 1749 by Mia Bishop RN  Outcome: Progressing     Problem: Discharge Planning  Goal: Discharge to home or other facility with appropriate resources  12/14/2024 0236 by Reina Nguyen RN  Outcome: Progressing  12/13/2024 1749 by Mia Bishop RN  Outcome: Progressing     Problem: ABCDS Injury Assessment  Goal: Absence of physical injury  12/14/2024 0236 by Reina Nguyen RN  Outcome: Progressing  12/13/2024 1749 by Mia Bishop RN  Outcome: Progressing     Problem: Pain  Goal: Verbalizes/displays adequate comfort level or baseline comfort level  12/14/2024 0236 by Reina Nguyen RN  Outcome: Progressing  12/13/2024 1749 by Mia Bishop RN  Outcome: Progressing

## 2024-12-14 NOTE — PROGRESS NOTES
[facility]  Office: [phone]  Carlos Ramírez DO, Jerry Washington DO, Keo Esquivel DO, Matthieu Steven DO, Janet Barr MD, Ivy Rome MD, Kimberlyn Jenkins MD, Deborah Dunbar MD,  Nick Garcia MD, Carlton Roth MD, Hellen Segura MD,  Cherry Dee DO, Carlos Doe MD, Cullen Damon MD, Alban Ramírez DO, Carol Wyatt MD,  Andrew Plata DO, Juanis Lees MD, Hui Laughlin MD, Mira Gee MD, Sonia Escobedo MD,  Gli Wheat MD, Chi Bang MD, Refugio Elliott MD, Livia Hinojosa MD, Sumit Michel MD, Mika Benjamin MD, Presley Hughes DO, Billy Pederson MD, Shirley Waterhouse, CNP,  Corin Kennedy CNP, Presley Billy, JIMENEZ,  Sia Hancock, BESSY, Grace Romo, CNP, Nicole Vargas, CNP, Carolynn Mckinney, CNP, Clau Russell, CNP, Faye Siu PA-C, Elena Rosen PA-C, Mandy Juárez, JIMENEZ, Mc Woo, CNP,  Lupe Ramirez, CNP, Valeria Tai, CNP,  Niru Man, JIMENEZ, Janessa Carter, CNP         [facility]   IN-PATIENT SERVICE   [facility]    Progress Note    12/14/2024    8:13 AM    Name:   Osbaldo Duff  MRN:     [id number]     Acct:      [id number]   Room:   0439/0439-01   Day:  23  Admit Date:  11/21/2024  2:53 PM    PCP:   No primary care provider on file.  Code Status:  Full Code    Subjective:     C/C:   Chief Complaint   Patient presents with    Abnormal Lab     Interval History Status: not changed.     Vitals reviewed, febrile Tmax 102.7 otherwise hemodynamically stable.  Saturating well on room air.  Labs reviewed, mild hyponatremia 135 and hypokalemia 3.5 with an elevated creatinine of 1.3 likely secondary to dehydration as patient states it is hard to eat or drink with a mouth sore, leukopenia 0.8, hemoglobin stable 7.3, platelets 10  Overnight patient continued to have fevers.     On examination patient resting comfortably in bed.  Continues to complain of fatigue and feeling feverish at times as well as lip pain  UTI and Urine Culture pending. CXR unremarkable. ID following. Cefepime restarted 12/11/24. Was off antibiotics for roughly 24 hours prior to needing to be restarted.  Vancomycin added 12/12/2024 due to persistent fevers.  Cefepime discontinued and patient started on aztreonam due to concern for possible drug rash.    Acute erythroid leukemia.  Hematology following.  Chemotherapy per hematology.  On acyclovir 400 mg 3 times daily for oral lesions. Bone marrow biopsy repeated 12/10/24 > demonstrating persistence of AEL.  Hematology/oncology discussing options with patient and family.    Pancytopenia.  Currently on chemotherapy.  Hemoglobin is stable 7. and platelets are currently 8.  Plan to transfuse for hemoglobin less than 7.0 and platelets less than 10.  Bone marrow biopsy completed 12/10/2024 with persistence of AEL.    E. coli septicemia with neutropenic fever.  Infectious disease following.  Continues on cefepime 2 g every 24 hours for E. coli septicemia with tentative stop date 12/11/2024 > had fevers following discontinuation of antibiotics for which they were resumed until 12/18/2024> changed to aztreonam due to concern for drug rash. Repeat blood cultures thus far negative x 2 day.     Hypertension.  Blood pressures currently well-controlled on Toprol-XL 25 mg daily.    HALLE on CKD stage II.  Creatinine currently stable.  HALLE appears to have resolved.  Avoid nephrotoxic agents.  Encourage oral intake.    DVT prophylaxis: Not safe for patient to be on oral anticoagulation given thrombocytopenia less than 50,000.  Ensure EPC cuffs are in place.  GI prophylaxis: None currently.    Discharge planning: Pending further hematology/oncology recommendations.  Continue gentle hydration due to poor oral intake with more lesion.    Andrew Plata DO  12/14/2024  8:13 AM

## 2024-12-14 NOTE — PLAN OF CARE
Problem: Infection - Adult  Goal: Absence of infection at discharge  12/14/2024 1623 by Shannon Webb RN  Outcome: Progressing  12/14/2024 0236 by Reina Nguyen RN  Outcome: Progressing     Problem: Hematologic - Adult  Goal: Maintains hematologic stability  12/14/2024 1623 by Shannon Webb RN  Outcome: Progressing  12/14/2024 0236 by Reina Nguyen RN  Outcome: Progressing     Problem: Safety - Adult  Goal: Free from fall injury  12/14/2024 1623 by Shannon Webb RN  Outcome: Progressing  12/14/2024 0236 by Reina Nguyen RN  Outcome: Progressing     Problem: Nutrition Deficit:  Goal: Optimize nutritional status  12/14/2024 1623 by Shannon Webb RN  Outcome: Progressing  12/14/2024 0236 by Reina Nguyen RN  Outcome: Progressing     Problem: Discharge Planning  Goal: Discharge to home or other facility with appropriate resources  12/14/2024 1623 by Shannon Webb RN  Outcome: Progressing  12/14/2024 0236 by Reina Nguyen RN  Outcome: Progressing     Problem: ABCDS Injury Assessment  Goal: Absence of physical injury  12/14/2024 1623 by Shannon Webb RN  Outcome: Progressing  12/14/2024 0236 by Reina Nguyen RN  Outcome: Progressing     Problem: Pain  Goal: Verbalizes/displays adequate comfort level or baseline comfort level  12/14/2024 1623 by Shannon Webb RN  Outcome: Progressing  12/14/2024 0236 by Reina Nguyen RN  Outcome: Progressing

## 2024-12-15 LAB
ANION GAP SERPL CALCULATED.3IONS-SCNC: 10 MMOL/L (ref 9–16)
BUN SERPL-MCNC: 15 MG/DL (ref 8–23)
CALCIUM SERPL-MCNC: 7.6 MG/DL (ref 8.6–10.4)
CHLORIDE SERPL-SCNC: 108 MMOL/L (ref 98–107)
CO2 SERPL-SCNC: 16 MMOL/L (ref 20–31)
CREAT SERPL-MCNC: 1 MG/DL (ref 0.7–1.2)
ERYTHROCYTE [DISTWIDTH] IN BLOOD BY AUTOMATED COUNT: 12.6 % (ref 11.8–14.4)
FLOW CYTOMETRY, BM: NORMAL
GFR, ESTIMATED: 82 ML/MIN/1.73M2
GLUCOSE SERPL-MCNC: 109 MG/DL (ref 74–99)
HCT VFR BLD AUTO: 20.5 % (ref 40.7–50.3)
HCT VFR BLD AUTO: 24.3 % (ref 40.7–50.3)
HGB BLD-MCNC: 6.5 G/DL (ref 13–17)
HGB BLD-MCNC: 8.1 G/DL (ref 13–17)
MCH RBC QN AUTO: 28.9 PG (ref 25.2–33.5)
MCHC RBC AUTO-ENTMCNC: 31.7 G/DL (ref 28.4–34.8)
MCV RBC AUTO: 91.1 FL (ref 82.6–102.9)
NRBC BLD-RTO: 0 PER 100 WBC
PLATELET # BLD AUTO: ABNORMAL K/UL (ref 138–453)
PLATELET, FLUORESCENCE: 10 K/UL (ref 138–453)
PLATELETS.RETICULATED NFR BLD AUTO: 7 % (ref 1.1–10.3)
POTASSIUM SERPL-SCNC: 3.5 MMOL/L (ref 3.7–5.3)
RBC # BLD AUTO: 2.25 M/UL (ref 4.21–5.77)
SODIUM SERPL-SCNC: 134 MMOL/L (ref 136–145)
WBC OTHER # BLD: 1.2 K/UL (ref 3.5–11.3)

## 2024-12-15 PROCEDURE — 85018 HEMOGLOBIN: CPT

## 2024-12-15 PROCEDURE — 6370000000 HC RX 637 (ALT 250 FOR IP): Performed by: NURSE PRACTITIONER

## 2024-12-15 PROCEDURE — 2580000003 HC RX 258: Performed by: NURSE PRACTITIONER

## 2024-12-15 PROCEDURE — 2060000000 HC ICU INTERMEDIATE R&B

## 2024-12-15 PROCEDURE — 85027 COMPLETE CBC AUTOMATED: CPT

## 2024-12-15 PROCEDURE — 99232 SBSQ HOSP IP/OBS MODERATE 35: CPT | Performed by: STUDENT IN AN ORGANIZED HEALTH CARE EDUCATION/TRAINING PROGRAM

## 2024-12-15 PROCEDURE — P9016 RBC LEUKOCYTES REDUCED: HCPCS

## 2024-12-15 PROCEDURE — 2580000003 HC RX 258: Performed by: INTERNAL MEDICINE

## 2024-12-15 PROCEDURE — 99232 SBSQ HOSP IP/OBS MODERATE 35: CPT | Performed by: INTERNAL MEDICINE

## 2024-12-15 PROCEDURE — 85055 RETICULATED PLATELET ASSAY: CPT

## 2024-12-15 PROCEDURE — 6360000002 HC RX W HCPCS: Performed by: STUDENT IN AN ORGANIZED HEALTH CARE EDUCATION/TRAINING PROGRAM

## 2024-12-15 PROCEDURE — 36415 COLL VENOUS BLD VENIPUNCTURE: CPT

## 2024-12-15 PROCEDURE — 80048 BASIC METABOLIC PNL TOTAL CA: CPT

## 2024-12-15 PROCEDURE — 36430 TRANSFUSION BLD/BLD COMPNT: CPT

## 2024-12-15 PROCEDURE — 2580000003 HC RX 258: Performed by: STUDENT IN AN ORGANIZED HEALTH CARE EDUCATION/TRAINING PROGRAM

## 2024-12-15 PROCEDURE — 85014 HEMATOCRIT: CPT

## 2024-12-15 PROCEDURE — 6360000002 HC RX W HCPCS: Performed by: INTERNAL MEDICINE

## 2024-12-15 RX ORDER — POTASSIUM CHLORIDE 7.45 MG/ML
10 INJECTION INTRAVENOUS
Status: COMPLETED | OUTPATIENT
Start: 2024-12-15 | End: 2024-12-15

## 2024-12-15 RX ORDER — SODIUM CHLORIDE 9 MG/ML
INJECTION, SOLUTION INTRAVENOUS PRN
Status: DISCONTINUED | OUTPATIENT
Start: 2024-12-15 | End: 2024-12-18 | Stop reason: HOSPADM

## 2024-12-15 RX ADMIN — THERA TABS 1 TABLET: TAB at 08:09

## 2024-12-15 RX ADMIN — POTASSIUM CHLORIDE 10 MEQ: 7.45 INJECTION INTRAVENOUS at 17:31

## 2024-12-15 RX ADMIN — AZTREONAM 1000 MG: 1 INJECTION, POWDER, LYOPHILIZED, FOR SOLUTION INTRAMUSCULAR; INTRAVENOUS at 15:54

## 2024-12-15 RX ADMIN — AZTREONAM 1000 MG: 1 INJECTION, POWDER, LYOPHILIZED, FOR SOLUTION INTRAMUSCULAR; INTRAVENOUS at 00:23

## 2024-12-15 RX ADMIN — SODIUM CHLORIDE, PRESERVATIVE FREE 10 ML: 5 INJECTION INTRAVENOUS at 21:20

## 2024-12-15 RX ADMIN — POTASSIUM CHLORIDE 10 MEQ: 7.45 INJECTION INTRAVENOUS at 16:30

## 2024-12-15 RX ADMIN — AZTREONAM 1000 MG: 1 INJECTION, POWDER, LYOPHILIZED, FOR SOLUTION INTRAMUSCULAR; INTRAVENOUS at 08:09

## 2024-12-15 RX ADMIN — Medication 5 ML: at 21:19

## 2024-12-15 RX ADMIN — POTASSIUM CHLORIDE 10 MEQ: 7.45 INJECTION INTRAVENOUS at 18:32

## 2024-12-15 RX ADMIN — METOPROLOL SUCCINATE 25 MG: 25 TABLET, EXTENDED RELEASE ORAL at 08:09

## 2024-12-15 RX ADMIN — SODIUM CHLORIDE: 9 INJECTION, SOLUTION INTRAVENOUS at 06:03

## 2024-12-15 RX ADMIN — VANCOMYCIN HYDROCHLORIDE 1000 MG: 1 INJECTION, POWDER, LYOPHILIZED, FOR SOLUTION INTRAVENOUS at 09:57

## 2024-12-15 RX ADMIN — SODIUM CHLORIDE, PRESERVATIVE FREE 10 ML: 5 INJECTION INTRAVENOUS at 21:19

## 2024-12-15 RX ADMIN — VANCOMYCIN HYDROCHLORIDE 1000 MG: 1 INJECTION, POWDER, LYOPHILIZED, FOR SOLUTION INTRAVENOUS at 21:28

## 2024-12-15 RX ADMIN — SODIUM CHLORIDE, PRESERVATIVE FREE 10 ML: 5 INJECTION INTRAVENOUS at 08:09

## 2024-12-15 ASSESSMENT — PAIN DESCRIPTION - DESCRIPTORS: DESCRIPTORS: SORE

## 2024-12-15 ASSESSMENT — PAIN DESCRIPTION - LOCATION: LOCATION: MOUTH

## 2024-12-15 ASSESSMENT — PAIN SCALES - GENERAL: PAINLEVEL_OUTOF10: 3

## 2024-12-15 NOTE — PROGRESS NOTES
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    CHIEF COMPLAINT:    Chief Complaint   Patient presents with    Abnormal Lab       History Obtained From:  patient and chart  INTERVAL HISTORY:          Interval history:  Day #22  Patient spiked a fever today.  Infectious disease input appreciated.  Antibiotics changed to as Azactam.   Continues to have some diarrhea.  Mild  No nausea or vomiting  Bone marrow results were reviewed.  Unfortunately has residual disease more than 20 to 30%.  He is not responding well to chemotherapy.    HISTORY OF PRESENT ILLNESS:    Osbaldo Duff is a 69 y.o. male who is admitted to the hospital on 11/21/2024  for anemia, low hb and acute leukemia diagnosis    Patient was following with Dr. Gallego as outpatient for possible suspected bone marrow pathology including MDS/leukemia.    Bone marrow biopsy was performed on 11/11/2024 and was unclear diagnosis here therefore was sent to 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    Patient has pancytopenia.  Patient is now sent to ER for further management and likely administration of induction chemotherapy.    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  Anticipating venetoclax and Vidaza     Summary of Case/History:  Osbaldo Duff a 69 y.o.male is a patient who admitted to the hospital due to abnormal lab workup.  History was obtained through an .  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  He is on Azactam now  Completed course of acyclovir on 12/ I discussed the bone marrow biopsy results and diagnosis, prognosis and treatment recommendations with patient  Day #22  Family is contemplating options.  Likely to switch to venetoclax and azacitidine as an outpatient after recovery of the count.  Another option is reinduction with the patient did not have a robust response to chemotherapy.  If they agree to chemotherapy, I would probably treat him with FLAG +/- ROSETTE  regimen.   Prognosis is guarded       Chris Oliva MD                     This note is created with the assistance of a speech recognition program.  While intending to generate a document that actually reflects the content of the visit, the document can still have some errors including those of syntax and sound a like substitutions which may escape proof reading.  It such instances, actual meaning can be extrapolated by contextual diversion.

## 2024-12-15 NOTE — PROGRESS NOTES
[facility]  Office: [phone]  Carlos Ramírez DO, Jerry Washington DO, Keo Esquivel DO, Matthieu Steven DO, Janet Barr MD, Ivy Rome MD, Kimberlyn Jenkins MD, Deborah Dunbar MD,  Nick Garcia MD, Carlton Roth MD, Hellen Segura MD,  Cherry Dee DO, Carlos Doe MD, Cullen Damon MD, Alban Ramírez DO, Carol Wyatt MD,  Andrew Plata DO, Juanis Lees MD, Hui Laughlin MD, Mira Gee MD, Sonia Escobedo MD,  Gil Wheat MD, Chi Bang MD, Refugio Elliott MD, Livia Hinojosa MD, Sumit Michel MD, Mika Benjamin MD, Presley Hughes DO, Billy Pederson MD, Shirley Waterhouse, CNP,  Corin Kennedy CNP, Presley Billy, JIMENEZ,  Sia Hancock, BESSY, Grace Romo, CNP, Nicole Vargas, CNP, Carolynn Mckinney, CNP, Clau Russell, CNP, Faye Siu PA-C, Elena Rosen PA-C, Mandy Juárez, JIMENEZ, Mc Woo, CNP,  Lupe Ramirez, CNP, Valeria Tai, CNP,  Niru Man, CNP, Janessa Carter, CNP         [facility]   IN-PATIENT SERVICE   [facility]    Progress Note    12/15/2024    1:16 PM    Name:   Osbaldo Duff  MRN:     [id number]     Acct:      [id number]   Room:   [address] [place], [place] [address]-[facility] Day:  24  Admit Date:  11/21/2024  2:53 PM    PCP:   No primary care provider on file.  Code Status:  Full Code    Subjective:     C/C:   Chief Complaint   Patient presents with    Abnormal Lab     Interval History Status: not changed.     Vitals reviewed, febrile Tmax 102 however last fever 0753 12/14/24 otherwise hemodynamically stable.  Saturating well on room air.  Labs reviewed, leukopenia improving to 1.2, hemoglobin 6.5, platelets are stable at 10.  BMP is pending.  Overnight patient had no significant events    On examination patient resting comfortably in bed.  Continues to complain of fatigue and feeling feverish at times as well as lip pain but states he is improving today otherwise denies any complaints. Hematology/oncology discussing bone marrow  to person, place and time and normal affect  Lungs:  clear to auscultation bilaterally, normal effort  Heart:  regular rate and rhythm, no murmur  Abdomen:  soft, nontender, nondistended  Extremities:  no edema, tenderness in the calves  Skin: Oral lesions right lower lip improving but lesion remains present.  Petechial appearing rash to medial thighs, bilateral upper extremities, bilateral chest and back and now on bilateral antecubital fossa.            Assessment:        Hospital Problems             Last Modified POA    * (Principal) Acute erythroid leukemia (HCC) 11/22/2024 Yes    Acute anemia 11/21/2024 Yes    Thrombocytopenia ([facility]) 11/21/2024 Yes    Acute leukemia not having achieved remission ([facility]) 11/22/2024 Yes    HTN (hypertension) 11/21/2024 Yes    Hyperuricemia 11/22/2024 Yes    Admission for chemotherapy 11/22/2024 Yes    Myelosuppression after chemotherapy 11/23/2024 Yes    Chronic kidney disease, stage II (mild) 11/23/2024 Yes    SVT (supraventricular tachycardia) ([facility]) 12/2/2024 Yes    Neutropenia with fever ([facility]) 12/2/2024 Yes    Sepsis due to Escherichia coli with acute renal failure ([facility]) 12/2/2024 Yes    Agranulocytosis ([facility]) 12/3/2024 Yes    Pancytopenia ([facility]) 12/4/2024 Yes    E. coli septicemia ([facility]) 12/4/2024 Yes     Plan:        Concern for worsening Sepsis. Repeat Blood cultures thus far negative, UA not consistent with UTI and Urine Culture pending. CXR unremarkable. ID following. Cefepime restarted 12/11/24. Was off antibiotics for roughly 24 hours prior to needing to be restarted.  Vancomycin added 12/12/2024 due to persistent fevers.  Cefepime discontinued and patient started on aztreonam due to concern for possible drug rash.    Acute erythroid leukemia.  Hematology following.  Chemotherapy per hematology.  On acyclovir 400 mg 3 times daily for oral lesions. Bone marrow biopsy repeated 12/10/24 > demonstrating persistence of AEL.  Hematology/oncology discussing options with patient and

## 2024-12-15 NOTE — PROGRESS NOTES
[facility]  Occupational Therapy Not Seen Note    DATE: 12/15/2024    NAME: Osbaldo Duff  MRN: [id number]   : 1955      Patient not seen this date for Occupational Therapy due to:    Blood Transfusion:  low HGB, plan for transfusion    Next Scheduled Treatment: check back later as able or 2024     Electronically signed by MALIHA Ricks on 12/15/2024 at 7:54 AM

## 2024-12-15 NOTE — CARE COORDINATION
Met with the patient. His wife and nurse at bedside. Plan is still undecided. Patient would like for case management to return tomorrow when his daughter is here. Home care list provided.

## 2024-12-15 NOTE — PROGRESS NOTES
0.9 % sodium chloride infusion   IntraVENous PRN Yevgeniy Odell MD   Stopped at [phone]       Allergies:  Patient has no known allergies.    Social History:   reports that he has never smoked. He has never used smokeless tobacco. He reports that he does not currently use alcohol. He reports that he does not use drugs.     Family History: family history includes Stroke in his father and mother.    REVIEW OF SYSTEMS:    Constitutional: No fever or chills. No night sweats, no weight loss   Eyes: No eye discharge, double vision, or eye pain   HEENT: negative for sore mouth, sore throat, hoarseness and voice change   Respiratory: negative for cough , sputum, dyspnea, wheezing, hemoptysis, chest pain   Cardiovascular: negative for chest pain, dyspnea, palpitations, orthopnea, PND   Gastrointestinal: negative for nausea, vomiting, diarrhea, constipation, abdominal pain, Dysphagia, hematemesis and hematochezia   Genitourinary: negative for frequency, dysuria, nocturia, urinary incontinence, and hematuria   Integument: negative for rash, skin lesions, bruises.   Hematologic/Lymphatic: negative for easy bruising, bleeding, lymphadenopathy, or petechiae   Endocrine: negative for heat or cold intolerance,weight changes, change in bowel habits and hair loss   Musculoskeletal: negative for myalgias, arthralgias, pain, joint swelling,and bone pain   Neurological: negative for headaches, dizziness, seizures, weakness, numbness    PHYSICAL EXAM:      /71   Pulse 91   Temp 98.2 °F (36.8 °C) (Axillary)   Resp 23   Ht 1.778 m (5' 10\")   Wt 77.1 kg (169 lb 15.6 oz)   SpO2 100%   BMI 24.39 kg/m²    Temp (24hrs), Av °F (37.2 °C), Min:98.2 °F (36.8 °C), Max:99.7 °F (37.6 °C)    General appearance - well appearing, no in pain or distress   Mental status - alert and cooperative   Eyes - pupils equal and reactive, extraocular eye movements intact   Ears - bilateral TM's and external ear canals normal   Mouth -    INTERPRETATION    Karyotype:  42~46,XY,add(2)(q35),-3,del(5)(q12q33),nithin(6)t(3;6)(p21;p23),add(7)(  p13),nithin(7)t(3;7)(q22;q32),-9,  add(11)(p13),t(11;19)(q23;p13.3),-16,+19,nithin(19)t(11;19)(q23;p13.3),de  l(22)(q11.2),+1~2mar[cp19]/46,XY[1]    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    Interpretation:  The aberrations observed are seen in myeloid disorders including AML  and MDS.  The complexity of this chromosome complement likely  indicates a poor prognosis.  See separate report [id number] for  additional details.       AYAAN Velrade     FLOW CYTOMETRY REPORT     Date Ordered:     11/21/2024     Status:  Signed Out       Date Complete:     11/21/2024     By: Dee Dee Morton M.D.       Date Reported:     11/21/2024      INTERPRETATION  FLOW CYTOMETRIC IMMUNOPHENOTYPING, BONE MARROW ASPIRATE:    Bone marrow, flow cytometric immunophenotyping:     Hemodilute specimen. Negative for B-cell monoclonality and T-cell  aberrancy.     Myeloblasts comprise 0.1% of the total leukocytes.    RESULTS-COMMENTS    Sample type:  Bone marrow     Flow cytometric immunophenotyping analysis is performed on bone marrow  aspirate following rbc lysis procedure.  The cells are labeled by  direct ten color immunostaining procedure and analyzed on a 1000 [employer]  flow cytometer.     Sample cytocentrifuge differential cell count  34.8% Lymph  4.5% Monos  55.4% Myeloid cells  5.3% Other         Viability:  >95%     Marker Panels:     B-cell Tube:  CD5, CD10, CD19, CD20, CD22, CD23, CD45, , Kappa,  Lambda  T-cell Tube:  CD2, CD3, CD4, CD5, CD7, CD8, CD16, CD45, CD56, CD57     Myeloid Tube 1:  CD13, CD33, CD34, CD38, CD45, CD71, , HLADR     This leuk/lymph immunophenotyping test was developed and its  performance characteristics determined by [facility].  It has not been cleared or approved by the U.S.  Food and Drug Administration.  The FDA has determined that such  clearance

## 2024-12-15 NOTE — PLAN OF CARE
Problem: Infection - Adult  Goal: Absence of infection at discharge  12/15/2024 0218 by Reina Nguyen RN  Outcome: Progressing  12/14/2024 1623 by Shannon Webb RN  Outcome: Progressing     Problem: Hematologic - Adult  Goal: Maintains hematologic stability  12/15/2024 0218 by Reina Nguyen RN  Outcome: Progressing  12/14/2024 1623 by Shannon Webb RN  Outcome: Progressing     Problem: Safety - Adult  Goal: Free from fall injury  12/15/2024 0218 by Reina Nguyen RN  Outcome: Progressing  12/14/2024 1623 by Shannon Webb RN  Outcome: Progressing     Problem: Nutrition Deficit:  Goal: Optimize nutritional status  12/15/2024 0218 by Reina Nguyen RN  Outcome: Progressing  12/14/2024 1623 by Shannon Webb RN  Outcome: Progressing     Problem: Discharge Planning  Goal: Discharge to home or other facility with appropriate resources  12/15/2024 0218 by Reina Nguyen RN  Outcome: Progressing  12/14/2024 1623 by Shannon Webb RN  Outcome: Progressing     Problem: ABCDS Injury Assessment  Goal: Absence of physical injury  12/15/2024 0218 by Reina Nguyen RN  Outcome: Progressing  12/14/2024 1623 by Shannon Webb RN  Outcome: Progressing     Problem: Pain  Goal: Verbalizes/displays adequate comfort level or baseline comfort level  12/15/2024 0218 by Reina Nguyen RN  Outcome: Progressing  12/14/2024 1623 by Shannon Webb RN  Outcome: Progressing

## 2024-12-15 NOTE — PLAN OF CARE
Problem: Infection - Adult  Goal: Absence of infection at discharge  12/15/2024 1617 by Shannon eWbb RN  Outcome: Progressing  12/15/2024 0218 by Reina Nguyen RN  Outcome: Progressing     Problem: Hematologic - Adult  Goal: Maintains hematologic stability  12/15/2024 1617 by Shannon Webb RN  Outcome: Progressing  12/15/2024 0218 by Reina Nguyen RN  Outcome: Progressing     Problem: Safety - Adult  Goal: Free from fall injury  12/15/2024 1617 by Shannon Webb RN  Outcome: Progressing  12/15/2024 0218 by Reina Nguyen RN  Outcome: Progressing     Problem: Nutrition Deficit:  Goal: Optimize nutritional status  12/15/2024 1617 by Shannon Webb RN  Outcome: Progressing  12/15/2024 0218 by Reina Nguyen RN  Outcome: Progressing     Problem: Discharge Planning  Goal: Discharge to home or other facility with appropriate resources  12/15/2024 1617 by Shannon Webb RN  Outcome: Progressing  12/15/2024 0218 by Reina Nguyen RN  Outcome: Progressing     Problem: ABCDS Injury Assessment  Goal: Absence of physical injury  12/15/2024 1617 by Shannon Webb RN  Outcome: Progressing  12/15/2024 0218 by Reina Nguyen RN  Outcome: Progressing     Problem: Pain  Goal: Verbalizes/displays adequate comfort level or baseline comfort level  12/15/2024 1617 by Shannon Webb RN  Outcome: Progressing  12/15/2024 0218 by Reina Nguyen RN  Outcome: Progressing

## 2024-12-16 LAB
ABO + RH BLD: NORMAL
ABO/RH: NORMAL
ANION GAP SERPL CALCULATED.3IONS-SCNC: 7 MMOL/L (ref 9–16)
ANTIBODY SCREEN: NEGATIVE
ARM BAND NUMBER: NORMAL
ARM BAND NUMBER: NORMAL
BASOPHILS # BLD: 0 K/UL (ref 0–0.2)
BASOPHILS NFR BLD: 0 % (ref 0–2)
BLOOD BANK BLOOD PRODUCT EXPIRATION DATE: NORMAL
BLOOD BANK DISPENSE STATUS: NORMAL
BLOOD BANK ISBT PRODUCT BLOOD TYPE: 5100
BLOOD BANK PRODUCT CODE: NORMAL
BLOOD BANK SAMPLE EXPIRATION: NORMAL
BLOOD BANK SAMPLE EXPIRATION: NORMAL
BLOOD BANK UNIT TYPE AND RH: NORMAL
BLOOD GROUP ANTIBODIES SERPL: NEGATIVE
BPU ID: NORMAL
BUN SERPL-MCNC: 12 MG/DL (ref 8–23)
CALCIUM SERPL-MCNC: 7.6 MG/DL (ref 8.6–10.4)
CELLS COUNTED: 50
CHLORIDE SERPL-SCNC: 109 MMOL/L (ref 98–107)
CO2 SERPL-SCNC: 18 MMOL/L (ref 20–31)
COMPONENT: NORMAL
CREAT SERPL-MCNC: 0.8 MG/DL (ref 0.7–1.2)
CROSSMATCH RESULT: NORMAL
EOSINOPHIL # BLD: 0 K/UL (ref 0–0.4)
EOSINOPHILS RELATIVE PERCENT: 0 % (ref 1–4)
ERYTHROCYTE [DISTWIDTH] IN BLOOD BY AUTOMATED COUNT: 13.1 % (ref 11.8–14.4)
ERYTHROCYTE [DISTWIDTH] IN BLOOD BY AUTOMATED COUNT: 13.1 % (ref 11.8–14.4)
GFR, ESTIMATED: >90 ML/MIN/1.73M2
GLUCOSE SERPL-MCNC: 92 MG/DL (ref 74–99)
HCT VFR BLD AUTO: 21.5 % (ref 40.7–50.3)
HCT VFR BLD AUTO: 22.4 % (ref 40.7–50.3)
HGB BLD-MCNC: 7.1 G/DL (ref 13–17)
HGB BLD-MCNC: 7.6 G/DL (ref 13–17)
IMM GRANULOCYTES # BLD AUTO: 0 K/UL (ref 0–0.3)
IMM GRANULOCYTES NFR BLD: 0 %
LYMPHOCYTES NFR BLD: 1.05 K/UL (ref 1–4.8)
LYMPHOCYTES RELATIVE PERCENT: 66 % (ref 24–44)
MCH RBC QN AUTO: 28.9 PG (ref 25.2–33.5)
MCH RBC QN AUTO: 29.7 PG (ref 25.2–33.5)
MCHC RBC AUTO-ENTMCNC: 33 G/DL (ref 28.4–34.8)
MCHC RBC AUTO-ENTMCNC: 33.9 G/DL (ref 28.4–34.8)
MCV RBC AUTO: 87.4 FL (ref 82.6–102.9)
MCV RBC AUTO: 87.5 FL (ref 82.6–102.9)
MONOCYTES NFR BLD: 0.1 K/UL (ref 0.1–0.8)
MONOCYTES NFR BLD: 6 % (ref 1–7)
MORPHOLOGY: NORMAL
NEUTROPHILS NFR BLD: 28 % (ref 36–66)
NEUTS SEG NFR BLD: 0.45 K/UL (ref 1.8–7.7)
NRBC BLD-RTO: 0 PER 100 WBC
NRBC BLD-RTO: 0 PER 100 WBC
PLATELET # BLD AUTO: ABNORMAL K/UL (ref 138–453)
PLATELET # BLD AUTO: ABNORMAL K/UL (ref 138–453)
PLATELET, FLUORESCENCE: 25 K/UL (ref 138–453)
PLATELET, FLUORESCENCE: 9 K/UL (ref 138–453)
PLATELETS.RETICULATED NFR BLD AUTO: 11.3 % (ref 1.1–10.3)
PLATELETS.RETICULATED NFR BLD AUTO: 3.2 % (ref 1.1–10.3)
POTASSIUM SERPL-SCNC: 3.6 MMOL/L (ref 3.7–5.3)
RBC # BLD AUTO: 2.46 M/UL (ref 4.21–5.77)
RBC # BLD AUTO: 2.56 M/UL (ref 4.21–5.77)
SODIUM SERPL-SCNC: 134 MMOL/L (ref 136–145)
TRANSFUSION STATUS: NORMAL
UNIT DIVISION: 0
UNIT ISSUE DATE/TIME: NORMAL
WBC OTHER # BLD: 1.6 K/UL (ref 3.5–11.3)
WBC OTHER # BLD: 1.6 K/UL (ref 3.5–11.3)

## 2024-12-16 PROCEDURE — 85055 RETICULATED PLATELET ASSAY: CPT

## 2024-12-16 PROCEDURE — 6370000000 HC RX 637 (ALT 250 FOR IP): Performed by: NURSE PRACTITIONER

## 2024-12-16 PROCEDURE — 2580000003 HC RX 258: Performed by: INTERNAL MEDICINE

## 2024-12-16 PROCEDURE — 85025 COMPLETE CBC W/AUTO DIFF WBC: CPT

## 2024-12-16 PROCEDURE — 36415 COLL VENOUS BLD VENIPUNCTURE: CPT

## 2024-12-16 PROCEDURE — 97530 THERAPEUTIC ACTIVITIES: CPT

## 2024-12-16 PROCEDURE — 6360000002 HC RX W HCPCS: Performed by: INTERNAL MEDICINE

## 2024-12-16 PROCEDURE — 86901 BLOOD TYPING SEROLOGIC RH(D): CPT

## 2024-12-16 PROCEDURE — 2580000003 HC RX 258: Performed by: STUDENT IN AN ORGANIZED HEALTH CARE EDUCATION/TRAINING PROGRAM

## 2024-12-16 PROCEDURE — 85027 COMPLETE CBC AUTOMATED: CPT

## 2024-12-16 PROCEDURE — 86850 RBC ANTIBODY SCREEN: CPT

## 2024-12-16 PROCEDURE — 99232 SBSQ HOSP IP/OBS MODERATE 35: CPT | Performed by: INTERNAL MEDICINE

## 2024-12-16 PROCEDURE — 99232 SBSQ HOSP IP/OBS MODERATE 35: CPT | Performed by: STUDENT IN AN ORGANIZED HEALTH CARE EDUCATION/TRAINING PROGRAM

## 2024-12-16 PROCEDURE — 97166 OT EVAL MOD COMPLEX 45 MIN: CPT

## 2024-12-16 PROCEDURE — 97162 PT EVAL MOD COMPLEX 30 MIN: CPT

## 2024-12-16 PROCEDURE — 36430 TRANSFUSION BLD/BLD COMPNT: CPT

## 2024-12-16 PROCEDURE — 2060000000 HC ICU INTERMEDIATE R&B

## 2024-12-16 PROCEDURE — 97535 SELF CARE MNGMENT TRAINING: CPT

## 2024-12-16 PROCEDURE — P9073 PLATELETS PHERESIS PATH REDU: HCPCS

## 2024-12-16 PROCEDURE — 6360000002 HC RX W HCPCS: Performed by: STUDENT IN AN ORGANIZED HEALTH CARE EDUCATION/TRAINING PROGRAM

## 2024-12-16 PROCEDURE — 99233 SBSQ HOSP IP/OBS HIGH 50: CPT | Performed by: INTERNAL MEDICINE

## 2024-12-16 PROCEDURE — 2580000003 HC RX 258: Performed by: NURSE PRACTITIONER

## 2024-12-16 PROCEDURE — 80048 BASIC METABOLIC PNL TOTAL CA: CPT

## 2024-12-16 PROCEDURE — 86900 BLOOD TYPING SEROLOGIC ABO: CPT

## 2024-12-16 RX ORDER — DIPHENHYDRAMINE HYDROCHLORIDE 50 MG/ML
25 INJECTION INTRAMUSCULAR; INTRAVENOUS EVERY 6 HOURS PRN
Status: DISCONTINUED | OUTPATIENT
Start: 2024-12-16 | End: 2024-12-18 | Stop reason: HOSPADM

## 2024-12-16 RX ORDER — SODIUM CHLORIDE 9 MG/ML
INJECTION, SOLUTION INTRAVENOUS PRN
Status: DISCONTINUED | OUTPATIENT
Start: 2024-12-16 | End: 2024-12-18 | Stop reason: HOSPADM

## 2024-12-16 RX ADMIN — DIPHENHYDRAMINE HYDROCHLORIDE 25 MG: 50 INJECTION INTRAMUSCULAR; INTRAVENOUS at 12:12

## 2024-12-16 RX ADMIN — AZTREONAM 1000 MG: 1 INJECTION, POWDER, LYOPHILIZED, FOR SOLUTION INTRAMUSCULAR; INTRAVENOUS at 16:16

## 2024-12-16 RX ADMIN — Medication 5 ML: at 21:45

## 2024-12-16 RX ADMIN — VANCOMYCIN HYDROCHLORIDE 1000 MG: 1 INJECTION, POWDER, LYOPHILIZED, FOR SOLUTION INTRAVENOUS at 21:49

## 2024-12-16 RX ADMIN — SODIUM CHLORIDE, PRESERVATIVE FREE 10 ML: 5 INJECTION INTRAVENOUS at 08:50

## 2024-12-16 RX ADMIN — VANCOMYCIN HYDROCHLORIDE 1000 MG: 1 INJECTION, POWDER, LYOPHILIZED, FOR SOLUTION INTRAVENOUS at 09:49

## 2024-12-16 RX ADMIN — AZTREONAM 1000 MG: 1 INJECTION, POWDER, LYOPHILIZED, FOR SOLUTION INTRAMUSCULAR; INTRAVENOUS at 00:32

## 2024-12-16 RX ADMIN — SODIUM CHLORIDE, PRESERVATIVE FREE 10 ML: 5 INJECTION INTRAVENOUS at 21:45

## 2024-12-16 RX ADMIN — METOPROLOL SUCCINATE 25 MG: 25 TABLET, EXTENDED RELEASE ORAL at 08:37

## 2024-12-16 RX ADMIN — AZTREONAM 1000 MG: 1 INJECTION, POWDER, LYOPHILIZED, FOR SOLUTION INTRAMUSCULAR; INTRAVENOUS at 08:43

## 2024-12-16 RX ADMIN — THERA TABS 1 TABLET: TAB at 08:37

## 2024-12-16 RX ADMIN — SODIUM CHLORIDE: 9 INJECTION, SOLUTION INTRAVENOUS at 07:41

## 2024-12-16 ASSESSMENT — PAIN DESCRIPTION - LOCATION: LOCATION: MOUTH

## 2024-12-16 ASSESSMENT — PAIN SCALES - GENERAL: PAINLEVEL_OUTOF10: 3

## 2024-12-16 ASSESSMENT — PAIN DESCRIPTION - ORIENTATION: ORIENTATION: RIGHT;LOWER

## 2024-12-16 ASSESSMENT — PAIN - FUNCTIONAL ASSESSMENT: PAIN_FUNCTIONAL_ASSESSMENT: ACTIVITIES ARE NOT PREVENTED

## 2024-12-16 ASSESSMENT — PAIN DESCRIPTION - DESCRIPTORS: DESCRIPTORS: SORE

## 2024-12-16 NOTE — PROGRESS NOTES
[facility] - Progress Note    Today's Date and Time: 12/16/2024, 8:55 AM    Impression :   E. Coli septicemia-detected 12/1/24  Fever  Acute erythroid leukemia  S/p 7-3 chemotherapy that was initiated on 11/20/24  Pancytopenia  Hypotension   HALLE on CKD II  Diffuse skin rash: Allergy to cefepime plus petechiae from low platelet counts vs leukemia cutis from ongoing erythroid leukemia    Recommendations:   Discontinued IV Zosyn on 12-4-24.  D/C cefepime on 12-13-24 because of rash  Started Azactam on 12-13-24 @2 gm IV q 8 hr. Tentative stop date 12-18-24.  Added Vancomycin 1 gm q 12 hr because of breakthrough fevers although fevers may be transfusion related date. Stop date 12-17-24    Completed Acyclovir 400 mg PO TID. Stop date 12-.  Renal dosing  Monitor pancytopenia  Bone marrow biopsy occurred on 12- shows persistent erythroid leukemia.  12-: Repeat blood cultures show no growth     Medical Decision Making/Summary/Discussion:12/16/2024     Elements of Medical Decision Making:  Note: I have independently performed the steps listed below as part of the medical decision making and evaluation.   Examined and discussed with patient.  Septicemia, gram-negative  E. coli septicemia 12/1/2024  Fevers  Acute erythroid leukemia  Status post 7-3 chemotherapy started on 11/20/2024  Pancytopenia  Hypotension  HALLE on CKD 2  Labs, medications, radiologic studies were reviewed with personal review of films  Radiologic studies  Lab work: Pancytopenia  Cultures: E. coli septicemia  Large amounts of data were reviewed  Please see history of present illness  and daily progress note below  Discussed with nursing Staff, Discharge planner  Dr Kate's team. Critical care residents  Infection Control and Prevention measures reviewed  Universal precaution  Contact isolation  All prior entries were reviewed  Administer medications as ordered  Zosyn D/C  On azactam  On IV

## 2024-12-16 NOTE — PLAN OF CARE
Problem: Infection - Adult  Goal: Absence of infection at discharge  12/16/2024 0232 by Yamini Perkins RN  Outcome: Progressing     Problem: Hematologic - Adult  Goal: Maintains hematologic stability  12/16/2024 0232 by Yamini Perkins RN  Outcome: Progressing     Problem: Safety - Adult  Goal: Free from fall injury  12/16/2024 0232 by Yamini Perkins RN  Outcome: Progressing     Problem: Nutrition Deficit:  Goal: Optimize nutritional status  12/16/2024 0232 by Yamini Perkins RN  Outcome: Progressing     Problem: Discharge Planning  Goal: Discharge to home or other facility with appropriate resources  12/16/2024 0232 by Yamini Perkins RN  Outcome: Progressing     Problem: ABCDS Injury Assessment  Goal: Absence of physical injury  12/16/2024 0232 by Yamini Perkins RN  Outcome: Progressing     Problem: Pain  Goal: Verbalizes/displays adequate comfort level or baseline comfort level  12/16/2024 0232 by Yamini Perkins RN  Outcome: Progressing

## 2024-12-16 NOTE — PROGRESS NOTES
Today's Date: 12/16/2024  Patient Name: Osbaldo Duff  Date of admission: 11/21/2024  2:53 PM  Patient's age: 69 y.o., 1955  Admission Dx: Acute leukemia, in relapse (HCC) [C95.02]  Acute leukemia not having achieved remission (HCC) [C95.00]    Reason for Consult: management recommendations  Requesting Physician: No admitting provider for patient encounter.    Chief Complaint: Pancytopenia secondary to acute leukemia relapse    Interval Changes:  -Patient awake, alert, oriented X4.  Under no acute distress.  -Hemodynamically stable breathing on room air  -Latest labs show sodium 134, potassium 3.6, creatinine 0.8, GFR 90, WBC count 1.6, hemoglobin 7.6 decreased from 8.1, platelet count 9 decreased from 10  -No episode of fever overnight  -Denies shortness of breath, chest pain, abdominal pain.  Does complain of loose stools.  -Currently on vancomycin and calcitonin.  Cefepime was discontinued considering right-sided rash.  Completed acyclovir on 12/12/2024.    History of Present Illness:    Osbaldo Duff is a 69 y.o. male who is admitted to the hospital on 11/21/2024  for anemia, low hb and acute leukemia diagnosis     Patient was following with Dr. Gallego as outpatient for possible suspected bone marrow pathology including MDS/leukemia.     Bone marrow biopsy was performed on 11/11/2024 and was unclear diagnosis here therefore was sent to [facility] which confirmed the diagnosis of acute erythroid leukemia.     Patient has pancytopenia.  Patient is now sent to ER for further management and likely administration of induction chemotherapy.    Past Medical History:   has a past medical history of Acute erythroid leukemia (HCC), Anemia, Thrombocytopenia (HCC), and Wears dentures.    Past Surgical History:   has a past surgical history that includes Hand surgery; Inguinal hernia repair; CT BIOPSY BONE MARROW (11/11/2024); and CT BIOPSY BONE MARROW (12/10/2024).     Medications:    Prior to

## 2024-12-16 NOTE — CARE COORDINATION
Met with the patient and his family to discuss the transition plan. The patient will return home with family providing care. Patient to receive outpatient treatment for cancer. Waiting for lab work to improve.

## 2024-12-16 NOTE — PLAN OF CARE
Problem: Infection - Adult  Goal: Absence of infection at discharge  Outcome: Progressing     Problem: Hematologic - Adult  Goal: Maintains hematologic stability  Outcome: Progressing     Problem: Safety - Adult  Goal: Free from fall injury  Outcome: Progressing     Problem: Nutrition Deficit:  Goal: Optimize nutritional status  Outcome: Progressing     Problem: ABCDS Injury Assessment  Goal: Absence of physical injury  Outcome: Progressing     Problem: Pain  Goal: Verbalizes/displays adequate comfort level or baseline comfort level  Outcome: Progressing     Problem: Skin/Tissue Integrity  Goal: Absence of new skin breakdown  Description: 1.  Monitor for areas of redness and/or skin breakdown  2.  Assess vascular access sites hourly  3.  Every 4-6 hours minimum:  Change oxygen saturation probe site  4.  Every 4-6 hours:  If on nasal continuous positive airway pressure, respiratory therapy assess nares and determine need for appliance change or resting period.  Outcome: Progressing

## 2024-12-16 NOTE — PROGRESS NOTES
Min:97.5 °F (36.4 °C), Max:100 °F (37.8 °C)    No results for input(s): \"POCGLU\" in the last 72 hours.      I/O (24Hr):    Intake/Output Summary (Last 24 hours) at 12/16/2024 0901  Last data filed at 12/15/2024 1220  Gross per 24 hour   Intake 300 ml   Output --   Net 300 ml           Labs:  Hematology:  Recent Labs     12/14/24  0616 12/15/24  0424 12/15/24  1330 12/16/24  0554   WBC 0.8* 1.2*  --  1.6*   RBC 2.48* 2.25*  --  2.56*   HGB 7.3* 6.5* 8.1* 7.6*   HCT 21.9* 20.5* 24.3* 22.4*   MCV 88.3 91.1  --  87.5   MCH 29.4 28.9  --  29.7   MCHC 33.3 31.7  --  33.9   RDW 12.6 12.6  --  13.1   PLT See Reflexed IPF Result See Reflexed IPF Result  --  See Reflexed IPF Result     Chemistry:  Recent Labs     12/14/24  0616 12/15/24  1330 12/16/24  0554   * 134* 134*   K 3.5* 3.5* 3.6*    108* 109*   CO2 19* 16* 18*   GLUCOSE 104* 109* 92   BUN 17 15 12   CREATININE 1.3* 1.0 0.8   MG 1.9  --   --    ANIONGAP 10 10 7*   LABGLOM 60* 82 >90   CALCIUM 7.9* 7.6* 7.6*     Recent Labs     12/14/24  0616   AST 9*   ALT 23   ALKPHOS 69   BILITOT 0.3     ABG:  Lab Results   Component Value Date/Time    POCPH 7.474 12/02/2024 12:16 AM    POCPCO2 19.0 12/02/2024 12:16 AM    POCPO2 70.6 12/02/2024 12:16 AM    POCHCO3 14.0 12/02/2024 12:16 AM    NBEA 8.7 12/02/2024 12:16 AM    FWYC4RCI 95.6 12/02/2024 12:16 AM    FIO2 INFORMATION NOT PROVIDED 12/02/2024 10:04 AM     Lab Results   Component Value Date/Time    SPECIAL L HAND 2 ML 12/12/2024 07:40 AM     Lab Results   Component Value Date/Time    CULTURE NO GROWTH 4 DAYS 12/12/2024 07:40 AM     Radiology:  No results found.    Physical Examination:        General appearance:  alert, cooperative and no distress  Mental Status:  oriented to person, place and time and normal affect  Lungs:  clear to auscultation bilaterally, normal effort  Heart:  regular rate and rhythm, no murmur  Abdomen:  soft, nontender, nondistended  Extremities:  no edema, tenderness in the calves  Skin:  Bone marrow biopsy completed 12/10/2024 with persistence of AEL.    E. coli septicemia with neutropenic fever.  Infectious disease following.  Continues on cefepime 2 g every 24 hours for E. coli septicemia with tentative stop date 12/11/2024 > had fevers following discontinuation of antibiotics for which they were resumed until 12/18/2024> changed to aztreonam due to concern for drug rash. Repeat blood cultures thus far negative x 4 day.     Hypertension.  Blood pressures currently well-controlled on Toprol-XL 25 mg daily.    HALLE on CKD stage II.  Creatinine currently stable.  HALLE appears to have resolved.  Avoid nephrotoxic agents.  Encourage oral intake.    DVT prophylaxis: Not safe for patient to be on oral anticoagulation given thrombocytopenia less than 50,000.  Ensure EPC cuffs are in place.  GI prophylaxis: None currently.    Discharge planning: Pending further hematology/oncology recommendations.  Continue gentle hydration due to poor oral intake with oral lesion.    Andrew Plata DO  12/16/2024  9:01 AM

## 2024-12-16 NOTE — PROGRESS NOTES
Physical Therapy  Facility/Department: [address] ONC/MED SURG  Physical Therapy Initial Assessment    Name: Osbaldo Duff  : 1955  MRN: [id number]  Date of Service: 2024    Per EMR:     Osbaldo Duff is a 69 y.o. male who is admitted to the hospital on 2024  for anemia, low hb and acute leukemia diagnosis     Patient was following with Dr. Gallego as outpatient for possible suspected bone marrow pathology including MDS/leukemia.     Bone marrow biopsy was performed on 2024 and was unclear diagnosis here therefore was sent to [facility] which confirmed the diagnosis of acute erythroid leukemia.     Patient has pancytopenia.  Patient is now sent to ER for further management and likely administration of induction chemotherapy.    Discharge Recommendations:  Therapy recommended at discharge   PT Equipment Recommendations  Equipment Needed: Yes  Vendor Name: rolling walker      Patient Diagnosis(es): The encounter diagnosis was Acute leukemia not having achieved remission (HCC).  Past Medical History:  has a past medical history of Acute erythroid leukemia (HCC), Anemia, Thrombocytopenia (HCC), and Wears dentures.  Past Surgical History:  has a past surgical history that includes Hand surgery; Inguinal hernia repair; CT BIOPSY BONE MARROW (2024); and CT BIOPSY BONE MARROW (12/10/2024).    Assessment  Body Structures, Functions, Activity Limitations Requiring Skilled Therapeutic Intervention: Decreased functional mobility ;Decreased tolerance to work activity;Decreased strength;Decreased endurance;Decreased balance;Increased pain  Assessment: Pt presents with gait, balance and strength deficts with decline in endurance amb ~15 ft, sit <> stand transfer  Pt will continue to benefit from PT intervention  to progress functional abilty and mobility needed to regain prior level of safety and function  Specific Instructions for Next Treatment: Progress activity and encourage out of bed  heights with Mod I demonstrating safety with AD  Short Term Goal 3: Pt to ascend/descend 4 steps with Mod I and rigth railing  Short Term Goal 4: Pt to be provided verbal, written and practical instruction in bilateral Upper/  LE ROM resistance / exercises to improve general strength and activity tolerance.  Patient Goals   Patient Goals : To return home       Education  Patient Education  Education Given To: Patient  Education Provided: Role of Therapy;Plan of Care;Precautions;Transfer Training;Equipment;Fall Prevention Strategies  Education Provided Comments: Pt educated on safety and use of RW  Education Method: Verbal  Barriers to Learning: None  Education Outcome: Verbalized understanding;Continued education needed      Therapy Time   Individual Concurrent Group Co-treatment   Time In 1325         Time Out 1415         Minutes 50         Timed Code Treatment Minutes: 30 Minutes       Steffany Leon, PT, DPT

## 2024-12-16 NOTE — PROGRESS NOTES
Occupational Therapy  Occupational Therapy Initial Evaluation  Facility/Department: [address] ONC/MED SURG   Patient Name: Osbaldo Duff        MRN: [id number]    : 1955    Date of Service: 2024    Discharge Recommendations  Discharge Recommendations: Patient would benefit from continued therapy after discharge  OT Equipment Recommendations  Equipment Needed: Yes  Mobility Devices: Walker, ADL Assistive Devices  Walker: Rolling  ADL Assistive Devices: Grab Bars - shower    Chief Complaint   Patient presents with    Abnormal Lab     Past Medical History:  has a past medical history of Acute erythroid leukemia (HCC), Anemia, Thrombocytopenia (HCC), and Wears dentures.  Past Surgical History:  has a past surgical history that includes Hand surgery; Inguinal hernia repair; CT BIOPSY BONE MARROW (2024); and CT BIOPSY BONE MARROW (12/10/2024).    Assessment  Performance deficits / Impairments: Decreased functional mobility ;Decreased ADL status;Decreased safe awareness;Decreased high-level IADLs;Decreased balance;Decreased endurance;Decreased strength;Decreased ROM  Assessment: Pt agreed to occupational therapy evaluation with education provided on purpose and role of occupational therapy services in the acute care setting. OT facilitated assessing functional mobility and ADL performance to pt's tolerance this visit. Pt exhibited requiring SUP for bed mobility, CGA for functional sit<>stand transfers and CGA for functional moiblity. Transfers/mobility completed utilizing RW. Pt completed toileting w/ Min A. Pt reports typically IND, but the last few weeks has been weaker and having increased difficulty w/ ADLs and mobility.  Greatest limitations exhibited during these activities include overall decreased endurance, strength and decreased safety awareness. Based on the patients occupational performance throughout this evaluation, it is expected they are to require continued skilled OT services during their

## 2024-12-17 LAB
ANION GAP SERPL CALCULATED.3IONS-SCNC: 8 MMOL/L (ref 9–16)
BASOPHILS # BLD: 0 K/UL (ref 0–0.2)
BASOPHILS NFR BLD: 0 % (ref 0–2)
BLOOD BANK BLOOD PRODUCT EXPIRATION DATE: NORMAL
BLOOD BANK DISPENSE STATUS: NORMAL
BLOOD BANK ISBT PRODUCT BLOOD TYPE: 5100
BLOOD BANK PRODUCT CODE: NORMAL
BLOOD BANK UNIT TYPE AND RH: NORMAL
BPU ID: NORMAL
BUN SERPL-MCNC: 9 MG/DL (ref 8–23)
CALCIUM SERPL-MCNC: 7.7 MG/DL (ref 8.6–10.4)
CHLORIDE SERPL-SCNC: 108 MMOL/L (ref 98–107)
CHROMOSOME STUDY: NORMAL
CO2 SERPL-SCNC: 18 MMOL/L (ref 20–31)
COMPONENT: NORMAL
CREAT SERPL-MCNC: 0.9 MG/DL (ref 0.7–1.2)
EOSINOPHIL # BLD: 0 K/UL (ref 0–0.4)
EOSINOPHILS RELATIVE PERCENT: 0 % (ref 1–4)
ERYTHROCYTE [DISTWIDTH] IN BLOOD BY AUTOMATED COUNT: 13.2 % (ref 11.8–14.4)
GFR, ESTIMATED: >90 ML/MIN/1.73M2
GLUCOSE SERPL-MCNC: 93 MG/DL (ref 74–99)
HCT VFR BLD AUTO: 22.4 % (ref 40.7–50.3)
HGB BLD-MCNC: 7.3 G/DL (ref 13–17)
IMM GRANULOCYTES # BLD AUTO: 0.08 K/UL (ref 0–0.3)
IMM GRANULOCYTES NFR BLD: 4 %
LYMPHOCYTES NFR BLD: 0.91 K/UL (ref 1–4.8)
LYMPHOCYTES RELATIVE PERCENT: 48 % (ref 24–44)
MAGNESIUM SERPL-MCNC: 1.7 MG/DL (ref 1.6–2.4)
MCH RBC QN AUTO: 29.4 PG (ref 25.2–33.5)
MCHC RBC AUTO-ENTMCNC: 32.6 G/DL (ref 28.4–34.8)
MCV RBC AUTO: 90.3 FL (ref 82.6–102.9)
MICROORGANISM SPEC CULT: NORMAL
MICROORGANISM SPEC CULT: NORMAL
MONOCYTES NFR BLD: 0.11 K/UL (ref 0.1–0.8)
MONOCYTES NFR BLD: 6 % (ref 1–7)
MORPHOLOGY: NORMAL
NEUTROPHILS NFR BLD: 42 % (ref 36–66)
NEUTS SEG NFR BLD: 0.8 K/UL (ref 1.8–7.7)
NRBC BLD-RTO: 0 PER 100 WBC
PLATELET # BLD AUTO: ABNORMAL K/UL (ref 138–453)
PLATELET, FLUORESCENCE: 24 K/UL (ref 138–453)
PLATELETS.RETICULATED NFR BLD AUTO: 6.2 % (ref 1.1–10.3)
POTASSIUM SERPL-SCNC: 3.4 MMOL/L (ref 3.7–5.3)
RBC # BLD AUTO: 2.48 M/UL (ref 4.21–5.77)
SERVICE CMNT-IMP: NORMAL
SERVICE CMNT-IMP: NORMAL
SODIUM SERPL-SCNC: 134 MMOL/L (ref 136–145)
SPECIMEN DESCRIPTION: NORMAL
SPECIMEN DESCRIPTION: NORMAL
TRANSFUSION STATUS: NORMAL
UNIT DIVISION: 0
UNIT ISSUE DATE/TIME: NORMAL
WBC OTHER # BLD: 1.9 K/UL (ref 3.5–11.3)

## 2024-12-17 PROCEDURE — 2500000003 HC RX 250 WO HCPCS: Performed by: INTERNAL MEDICINE

## 2024-12-17 PROCEDURE — 80048 BASIC METABOLIC PNL TOTAL CA: CPT

## 2024-12-17 PROCEDURE — 2580000003 HC RX 258: Performed by: INTERNAL MEDICINE

## 2024-12-17 PROCEDURE — 36415 COLL VENOUS BLD VENIPUNCTURE: CPT

## 2024-12-17 PROCEDURE — 2060000000 HC ICU INTERMEDIATE R&B

## 2024-12-17 PROCEDURE — 85055 RETICULATED PLATELET ASSAY: CPT

## 2024-12-17 PROCEDURE — 6360000002 HC RX W HCPCS: Performed by: STUDENT IN AN ORGANIZED HEALTH CARE EDUCATION/TRAINING PROGRAM

## 2024-12-17 PROCEDURE — 99232 SBSQ HOSP IP/OBS MODERATE 35: CPT | Performed by: STUDENT IN AN ORGANIZED HEALTH CARE EDUCATION/TRAINING PROGRAM

## 2024-12-17 PROCEDURE — 2580000003 HC RX 258: Performed by: NURSE PRACTITIONER

## 2024-12-17 PROCEDURE — 99232 SBSQ HOSP IP/OBS MODERATE 35: CPT | Performed by: INTERNAL MEDICINE

## 2024-12-17 PROCEDURE — 87506 IADNA-DNA/RNA PROBE TQ 6-11: CPT

## 2024-12-17 PROCEDURE — 6370000000 HC RX 637 (ALT 250 FOR IP): Performed by: NURSE PRACTITIONER

## 2024-12-17 PROCEDURE — 6370000000 HC RX 637 (ALT 250 FOR IP): Performed by: STUDENT IN AN ORGANIZED HEALTH CARE EDUCATION/TRAINING PROGRAM

## 2024-12-17 PROCEDURE — 83735 ASSAY OF MAGNESIUM: CPT

## 2024-12-17 PROCEDURE — 85025 COMPLETE CBC W/AUTO DIFF WBC: CPT

## 2024-12-17 PROCEDURE — 6360000002 HC RX W HCPCS: Performed by: INTERNAL MEDICINE

## 2024-12-17 PROCEDURE — 2500000003 HC RX 250 WO HCPCS: Performed by: NURSE PRACTITIONER

## 2024-12-17 RX ORDER — HYDROXYZINE HYDROCHLORIDE 25 MG/1
25 TABLET, FILM COATED ORAL EVERY 8 HOURS PRN
Qty: 30 TABLET | Refills: 0 | Status: SHIPPED | OUTPATIENT
Start: 2024-12-17 | End: 2024-12-27

## 2024-12-17 RX ORDER — BENZOCAINE/MENTHOL 6 MG-10 MG
LOZENGE MUCOUS MEMBRANE 2 TIMES DAILY
Status: DISCONTINUED | OUTPATIENT
Start: 2024-12-17 | End: 2024-12-18 | Stop reason: HOSPADM

## 2024-12-17 RX ORDER — POTASSIUM CHLORIDE 1500 MG/1
40 TABLET, EXTENDED RELEASE ORAL ONCE
Status: COMPLETED | OUTPATIENT
Start: 2024-12-17 | End: 2024-12-17

## 2024-12-17 RX ORDER — MAGNESIUM SULFATE IN WATER 40 MG/ML
2000 INJECTION, SOLUTION INTRAVENOUS ONCE
Status: COMPLETED | OUTPATIENT
Start: 2024-12-17 | End: 2024-12-17

## 2024-12-17 RX ORDER — BENZOCAINE/MENTHOL 6 MG-10 MG
LOZENGE MUCOUS MEMBRANE
Qty: 30 G | Refills: 1 | Status: SHIPPED | OUTPATIENT
Start: 2024-12-17 | End: 2024-12-18

## 2024-12-17 RX ADMIN — HYDROCORTISONE: 10 CREAM TOPICAL at 14:09

## 2024-12-17 RX ADMIN — DIPHENHYDRAMINE HYDROCHLORIDE 25 MG: 50 INJECTION INTRAMUSCULAR; INTRAVENOUS at 09:23

## 2024-12-17 RX ADMIN — MAGNESIUM SULFATE HEPTAHYDRATE 2000 MG: 40 INJECTION, SOLUTION INTRAVENOUS at 09:32

## 2024-12-17 RX ADMIN — SODIUM CHLORIDE, PRESERVATIVE FREE 10 ML: 5 INJECTION INTRAVENOUS at 10:18

## 2024-12-17 RX ADMIN — SODIUM CHLORIDE, PRESERVATIVE FREE 10 ML: 5 INJECTION INTRAVENOUS at 20:38

## 2024-12-17 RX ADMIN — POTASSIUM CHLORIDE 40 MEQ: 1500 TABLET, EXTENDED RELEASE ORAL at 09:21

## 2024-12-17 RX ADMIN — AZTREONAM 1000 MG: 1 INJECTION, POWDER, LYOPHILIZED, FOR SOLUTION INTRAMUSCULAR; INTRAVENOUS at 08:40

## 2024-12-17 RX ADMIN — HYDROCORTISONE: 10 CREAM TOPICAL at 20:37

## 2024-12-17 RX ADMIN — VANCOMYCIN HYDROCHLORIDE 1000 MG: 1 INJECTION, POWDER, LYOPHILIZED, FOR SOLUTION INTRAVENOUS at 09:36

## 2024-12-17 RX ADMIN — METOPROLOL SUCCINATE 25 MG: 25 TABLET, EXTENDED RELEASE ORAL at 08:37

## 2024-12-17 RX ADMIN — AZTREONAM 1000 MG: 1 INJECTION, POWDER, LYOPHILIZED, FOR SOLUTION INTRAMUSCULAR; INTRAVENOUS at 00:21

## 2024-12-17 RX ADMIN — SODIUM CHLORIDE, PRESERVATIVE FREE 10 ML: 5 INJECTION INTRAVENOUS at 09:53

## 2024-12-17 RX ADMIN — THERA TABS 1 TABLET: TAB at 08:37

## 2024-12-17 NOTE — PLAN OF CARE
Problem: Infection - Adult  Goal: Absence of infection at discharge  Outcome: Progressing     Problem: Hematologic - Adult  Goal: Maintains hematologic stability  Outcome: Progressing     Problem: Safety - Adult  Goal: Free from fall injury  Outcome: Progressing     Problem: Nutrition Deficit:  Goal: Optimize nutritional status  Outcome: Progressing     Problem: Discharge Planning  Goal: Discharge to home or other facility with appropriate resources  Outcome: Progressing     Problem: Pain  Goal: Verbalizes/displays adequate comfort level or baseline comfort level  Outcome: Progressing

## 2024-12-17 NOTE — PLAN OF CARE
Problem: Infection - Adult  Goal: Absence of infection at discharge  12/17/2024 0407 by Yamini Perkins RN  Outcome: Progressing     Problem: Hematologic - Adult  Goal: Maintains hematologic stability  12/17/2024 0407 by Yamini Perkins RN  Outcome: Progressing     Problem: Safety - Adult  Goal: Free from fall injury  12/17/2024 0407 by Yamini Perkins RN  Outcome: Progressing     Problem: Nutrition Deficit:  Goal: Optimize nutritional status  12/17/2024 0407 by Yamini Perkins RN  Outcome: Progressing     Problem: Discharge Planning  Goal: Discharge to home or other facility with appropriate resources  Outcome: Progressing     Problem: ABCDS Injury Assessment  Goal: Absence of physical injury  12/17/2024 0407 by Yamini Perkins RN  Outcome: Progressing     Problem: Pain  Goal: Verbalizes/displays adequate comfort level or baseline comfort level  12/17/2024 0407 by Yamini Perkins RN  Outcome: Progressing     Problem: Skin/Tissue Integrity  Goal: Absence of new skin breakdown  Description: 1.  Monitor for areas of redness and/or skin breakdown  2.  Assess vascular access sites hourly  3.  Every 4-6 hours minimum:  Change oxygen saturation probe site  4.  Every 4-6 hours:  If on nasal continuous positive airway pressure, respiratory therapy assess nares and determine need for appliance change or resting period.  12/17/2024 0407 by Yamini Perkins RN  Outcome: Progressing

## 2024-12-17 NOTE — PROGRESS NOTES
chloride      sodium chloride      sodium chloride 50 mL/hr at 24 1830    sodium chloride      sodium chloride      sodium chloride      sodium chloride Stopped (24 8653)     PRN Meds: sodium chloride, diphenhydrAMINE, sodium chloride, sodium chloride, sodium chloride, magic (miracle) mouthwash, benzocaine, acetaminophen, ibuprofen, promethazine, LORazepam, sodium chloride flush, sodium chloride, ondansetron **OR** ondansetron, polyethylene glycol, bisacodyl, sodium chloride flush, sodium chloride    Data:     Past Medical History:   has a past medical history of Acute erythroid leukemia (HCC), Anemia, Thrombocytopenia (HCC), and Wears dentures.    Social History:   reports that he has never smoked. He has never used smokeless tobacco. He reports that he does not currently use alcohol. He reports that he does not use drugs.     Family History:   Family History   Problem Relation Age of Onset    Stroke Mother     Stroke Father        Vitals:  /69   Pulse 91   Temp 97.7 °F (36.5 °C) (Temporal)   Resp 22   Ht 1.778 m (5' 10\")   Wt 76.5 kg (168 lb 10.4 oz)   SpO2 100%   BMI 24.20 kg/m²   Temp (24hrs), Av.4 °F (36.9 °C), Min:97.7 °F (36.5 °C), Max:98.8 °F (37.1 °C)    No results for input(s): \"POCGLU\" in the last 72 hours.    I/O (24Hr):    Intake/Output Summary (Last 24 hours) at 2024 1535  Last data filed at 2024 1830  Gross per 24 hour   Intake 5150 ml   Output --   Net 5150 ml       Labs:  Hematology:  Recent Labs     24  0554 24  1232 24  0714   WBC 1.6* 1.6* 1.9*   RBC 2.56* 2.46* 2.48*   HGB 7.6* 7.1* 7.3*   HCT 22.4* 21.5* 22.4*   MCV 87.5 87.4 90.3   MCH 29.7 28.9 29.4   MCHC 33.9 33.0 32.6   RDW 13.1 13.1 13.2   PLT See Reflexed IPF Result See Reflexed IPF Result See Reflexed IPF Result     Chemistry:  Recent Labs     12/15/24  1330 24  0554 24  0714   * 134* 134*   K 3.5* 3.6* 3.4*   * 109* 108*   CO2 16* 18* 18*   GLUCOSE 109*

## 2024-12-17 NOTE — PROGRESS NOTES
[facility] - Progress Note    Today's Date and Time: 12/17/2024, 8:35 AM    Impression :   E. Coli septicemia-detected 12/1/24  Fever  Acute erythroid leukemia  S/p 7-3 chemotherapy that was initiated on 11/20/24  Pancytopenia  Hypotension   HALLE on CKD II  Diffuse skin rash: Allergy to cefepime plus petechiae from low platelet counts vs leukemia cutis from ongoing erythroid leukemia  Rash starting to fade suggesting allergy    Recommendations:   Monitor off antibiotics  :  Discontinued IV Zosyn on 12-4-24.  D/C cefepime on 12-13-24 because of rash  Started Azactam on 12-13-24 @ 2 gm IV q 8 hr. Stop date 12-17-24.  Vancomycin. Stop date 12-17-24.  Topical steroids for Skin itching    Completed Acyclovir 400 mg PO TID. Stop date 12-.  Renal dosing  Monitor pancytopenia  Bone marrow biopsy occurred on 12- shows persistent erythroid leukemia.  12-: Repeat blood cultures negative    Medical Decision Making/Summary/Discussion:12/17/2024     Elements of Medical Decision Making:  Note: I have independently performed the steps listed below as part of the medical decision making and evaluation.   Examined and discussed with patient.  Septicemia, gram-negative  E. coli septicemia 12/1/2024  Fevers  Acute erythroid leukemia  Status post 7-3 chemotherapy started on 11/20/2024  Pancytopenia  Hypotension  HALLE on CKD 2  Labs, medications, radiologic studies were reviewed with personal review of films  Radiologic studies  Lab work: Pancytopenia  Cultures: E. coli septicemia  Large amounts of data were reviewed  Please see history of present illness  and daily progress note below  Discussed with nursing Staff, Discharge planner  Dr Doe's team. Critical care residents  Infection Control and Prevention measures reviewed  Universal precaution  Contact isolation  All prior entries were reviewed  Administer medications as ordered  Zosyn D/C  D/C azactam  D/C IV  Smoking status: Never    Smokeless tobacco: Never   Vaping Use    Vaping status: Never Used   Substance and Sexual Activity    Alcohol use: Not Currently    Drug use: Never    Sexual activity: Never   Other Topics Concern    Not on file   Social History Narrative    Not on file     Social Determinants of Health     Financial Resource Strain: Not on file   Food Insecurity: No Food Insecurity (11/22/2024)    Hunger Vital Sign     Worried About Running Out of Food in the Last Year: Never true     Ran Out of Food in the Last Year: Never true   Transportation Needs: No Transportation Needs (11/22/2024)    PRAPARE - Transportation     Lack of Transportation (Medical): No     Lack of Transportation (Non-Medical): No   Physical Activity: Not on file   Stress: Not on file   Social Connections: Not on file   Intimate Partner Violence: Not on file   Housing Stability: Low Risk  (11/22/2024)    Housing Stability Vital Sign     Unable to Pay for Housing in the Last Year: No     Number of Times Moved in the Last Year: 0     Homeless in the Last Year: No       Family History:     Family History   Problem Relation Age of Onset    Stroke Mother     Stroke Father         Allergies:   Patient has no known allergies.       Medical Decision Making-Imaging:   XR CHEST PORTABLE    Result Date: 12/2/2024  EXAMINATION: ONE XRAY VIEW OF THE CHEST 12/2/2024 12:25 am COMPARISON: Chest radiograph 11/01/2024. HISTORY: ORDERING SYSTEM PROVIDED HISTORY: fever TECHNOLOGIST PROVIDED HISTORY: fever Reason for Exam: fever  upright port FINDINGS: A left upper extremity PICC terminates in the lower superior vena cava.  The cardiomediastinal silhouette is within normal limits.  No pneumothorax, vascular congestion, consolidation, or pleural effusion is identified.  No acute osseous abnormality.     1. No acute cardiopulmonary process. 2. Left upper extremity PICC terminates in the lower superior vena cava.       Medical Decision [id number]-[id number]:

## 2024-12-17 NOTE — PROGRESS NOTES
Today's Date: 12/17/2024  Patient Name: Osbaldo Duff  Date of admission: 11/21/2024  2:53 PM  Patient's age: 69 y.o., 1955  Admission Dx: Acute leukemia, in relapse (HCC) [C95.02]  Acute leukemia not having achieved remission (HCC) [C95.00]    Reason for Consult: management recommendations  Requesting Physician: No admitting provider for patient encounter.    Chief Complaint: Pancytopenia secondary to acute leukemia relapse    Interval Changes:  -Patient awake, alert, oriented X4.  Under no acute distress.  -Hemodynamically stable breathing on room air  -Latest labs show sodium 134, potassium 3.4, BUN 9, creatinine 0.9, GFR 90, glucose 93, WBC count 1.9 increased from 1.6, hemoglobin 7.3 increased from 7.1, platelet count 24 increased from 9  -No episode of fever overnight  -Denies shortness of breath, chest pain, abdominal pain.  Does complain of loose stools.  -Currently on vancomycin and aztreonam.  Cefepime was discontinued considering right-sided rash.  Completed acyclovir on 12/12/2024.    History of Present Illness:    Osbaldo Duff is a 69 y.o. male who is admitted to the hospital on 11/21/2024  for anemia, low hb and acute leukemia diagnosis     Patient was following with Dr. Gallego as outpatient for possible suspected bone marrow pathology including MDS/leukemia.     Bone marrow biopsy was performed on 11/11/2024 and was unclear diagnosis here therefore was sent to [facility] which confirmed the diagnosis of acute erythroid leukemia.     Patient has pancytopenia.  Patient is now sent to ER for further management and likely administration of induction chemotherapy.    Past Medical History:   has a past medical history of Acute erythroid leukemia (HCC), Anemia, Thrombocytopenia (HCC), and Wears dentures.    Past Surgical History:   has a past surgical history that includes Hand surgery; Inguinal hernia repair; CT BIOPSY BONE MARROW (11/11/2024); and CT BIOPSY BONE MARROW  available to me, outside records and discussed with the patient. I explained the significance of these abnormalities and possible etiology and management options.  Plan to likely to switch to venetoclax and azacitidine as outpatient after cell count recovery.  If reinduction chosen, plan to start with FLAG +/- ROSETTE regimen  Did not spike fever overnight.  On aztreonam and vancomycin as per ID.  Appreciate ID recommendations.  Monitor labs and transfuse as needed  We will continue to follow.  Final recommendations to follow after discussing with attending physician, Dr. Dove      We will continue to follow up on this patient.    Christophe Ramirez MD  Internal Medicine Resident, PGY-3  [facility]; [place], [place]  12/17/2024,10:20 AM         Attending Physician Statement   I have discussed the care of Osbaldo Duff, and I have examined the patient myselft and taken ros and hpi , including pertinent history and exam findings,  with the author of this note. I have reviewed the key elements of all parts of the encounter with the nurse practitioner/resident.  I agree with the assessment, plan and orders as documented by the above health care provider.  I have made necessary changes as deemed appropriate directly in the note.  More than 50% of the time was spent taking care of this patient in addition to the nurse practitioner time/ resident.  That also included history taking follow-up physical examination and review of system.                            Chris Dove MD                          [facility]                            This note is created with the assistance of a speech recognition program.  While intending to generate a document that actually reflects the content of the visit, the document can still have some errors including those of syntax and sound a like substitutions which may escape proof reading.  It such instances, actual meaning can be extrapolated by

## 2024-12-18 ENCOUNTER — TELEPHONE (OUTPATIENT)
Dept: ONCOLOGY | Age: 69
End: 2024-12-18

## 2024-12-18 VITALS
TEMPERATURE: 98.2 F | OXYGEN SATURATION: 98 % | HEIGHT: 70 IN | SYSTOLIC BLOOD PRESSURE: 134 MMHG | WEIGHT: 168.65 LBS | DIASTOLIC BLOOD PRESSURE: 74 MMHG | BODY MASS INDEX: 24.14 KG/M2 | HEART RATE: 87 BPM | RESPIRATION RATE: 16 BRPM

## 2024-12-18 LAB
ANION GAP SERPL CALCULATED.3IONS-SCNC: 8 MMOL/L (ref 9–16)
BASOPHILS # BLD: 0.02 K/UL (ref 0–0.2)
BASOPHILS NFR BLD: 1 % (ref 0–2)
BUN SERPL-MCNC: 6 MG/DL (ref 8–23)
CALCIUM SERPL-MCNC: 7.5 MG/DL (ref 8.6–10.4)
CAMPYLOBACTER DNA SPEC NAA+PROBE: NORMAL
CHLORIDE SERPL-SCNC: 109 MMOL/L (ref 98–107)
CO2 SERPL-SCNC: 20 MMOL/L (ref 20–31)
CREAT SERPL-MCNC: 0.9 MG/DL (ref 0.7–1.2)
EOSINOPHIL # BLD: 0 K/UL (ref 0–0.4)
EOSINOPHILS RELATIVE PERCENT: 0 % (ref 1–4)
ERYTHROCYTE [DISTWIDTH] IN BLOOD BY AUTOMATED COUNT: 13.4 % (ref 11.8–14.4)
ETEC ELTA+ESTB GENES STL QL NAA+PROBE: NORMAL
GFR, ESTIMATED: >90 ML/MIN/1.73M2
GLUCOSE SERPL-MCNC: 92 MG/DL (ref 74–99)
HCT VFR BLD AUTO: 20.8 % (ref 40.7–50.3)
HCT VFR BLD AUTO: 21.4 % (ref 40.7–50.3)
HGB BLD-MCNC: 7.1 G/DL (ref 13–17)
HGB BLD-MCNC: 7.1 G/DL (ref 13–17)
IMM GRANULOCYTES # BLD AUTO: 0 K/UL (ref 0–0.3)
IMM GRANULOCYTES NFR BLD: 0 %
LYMPHOCYTES NFR BLD: 0.75 K/UL (ref 1–4.8)
LYMPHOCYTES RELATIVE PERCENT: 34 % (ref 24–44)
MAGNESIUM SERPL-MCNC: 1.9 MG/DL (ref 1.6–2.4)
MCH RBC QN AUTO: 30 PG (ref 25.2–33.5)
MCHC RBC AUTO-ENTMCNC: 34.1 G/DL (ref 28.4–34.8)
MCV RBC AUTO: 87.8 FL (ref 82.6–102.9)
MONOCYTES NFR BLD: 0.29 K/UL (ref 0.1–0.8)
MONOCYTES NFR BLD: 13 % (ref 1–7)
MORPHOLOGY: NORMAL
NEUTROPHILS NFR BLD: 52 % (ref 36–66)
NEUTS SEG NFR BLD: 1.14 K/UL (ref 1.8–7.7)
NRBC BLD-RTO: 0 PER 100 WBC
P SHIGELLOIDES DNA STL QL NAA+PROBE: NORMAL
PLATELET # BLD AUTO: ABNORMAL K/UL (ref 138–453)
PLATELET, FLUORESCENCE: 25 K/UL (ref 138–453)
PLATELETS.RETICULATED NFR BLD AUTO: 10 % (ref 1.1–10.3)
POTASSIUM SERPL-SCNC: 3.4 MMOL/L (ref 3.7–5.3)
RBC # BLD AUTO: 2.37 M/UL (ref 4.21–5.77)
SALMONELLA DNA SPEC QL NAA+PROBE: NORMAL
SHIGA TOXIN STX GENE SPEC NAA+PROBE: NORMAL
SHIGELLA DNA SPEC QL NAA+PROBE: NORMAL
SODIUM SERPL-SCNC: 137 MMOL/L (ref 136–145)
SPECIMEN DESCRIPTION: NORMAL
V CHOL+PARA RFBL+TRKH+TNAA STL QL NAA+PR: NORMAL
WBC OTHER # BLD: 2.2 K/UL (ref 3.5–11.3)
Y ENTERO RECN STL QL NAA+PROBE: NORMAL

## 2024-12-18 PROCEDURE — 2500000003 HC RX 250 WO HCPCS: Performed by: INTERNAL MEDICINE

## 2024-12-18 PROCEDURE — 85055 RETICULATED PLATELET ASSAY: CPT

## 2024-12-18 PROCEDURE — 85025 COMPLETE CBC W/AUTO DIFF WBC: CPT

## 2024-12-18 PROCEDURE — 85018 HEMOGLOBIN: CPT

## 2024-12-18 PROCEDURE — 99232 SBSQ HOSP IP/OBS MODERATE 35: CPT | Performed by: INTERNAL MEDICINE

## 2024-12-18 PROCEDURE — 6370000000 HC RX 637 (ALT 250 FOR IP): Performed by: STUDENT IN AN ORGANIZED HEALTH CARE EDUCATION/TRAINING PROGRAM

## 2024-12-18 PROCEDURE — 85014 HEMATOCRIT: CPT

## 2024-12-18 PROCEDURE — 83735 ASSAY OF MAGNESIUM: CPT

## 2024-12-18 PROCEDURE — 2500000003 HC RX 250 WO HCPCS: Performed by: NURSE PRACTITIONER

## 2024-12-18 PROCEDURE — 02HV33Z INSERTION OF INFUSION DEVICE INTO SUPERIOR VENA CAVA, PERCUTANEOUS APPROACH: ICD-10-PCS | Performed by: INTERNAL MEDICINE

## 2024-12-18 PROCEDURE — 6370000000 HC RX 637 (ALT 250 FOR IP): Performed by: NURSE PRACTITIONER

## 2024-12-18 PROCEDURE — 99239 HOSP IP/OBS DSCHRG MGMT >30: CPT | Performed by: STUDENT IN AN ORGANIZED HEALTH CARE EDUCATION/TRAINING PROGRAM

## 2024-12-18 PROCEDURE — 80048 BASIC METABOLIC PNL TOTAL CA: CPT

## 2024-12-18 PROCEDURE — 36415 COLL VENOUS BLD VENIPUNCTURE: CPT

## 2024-12-18 RX ORDER — LACTOBACILLUS RHAMNOSUS GG 10B CELL
1 CAPSULE ORAL 2 TIMES DAILY
Status: DISCONTINUED | OUTPATIENT
Start: 2024-12-18 | End: 2024-12-18 | Stop reason: HOSPADM

## 2024-12-18 RX ORDER — POTASSIUM CHLORIDE 1500 MG/1
40 TABLET, EXTENDED RELEASE ORAL PRN
Status: DISCONTINUED | OUTPATIENT
Start: 2024-12-18 | End: 2024-12-18 | Stop reason: HOSPADM

## 2024-12-18 RX ORDER — LOPERAMIDE HYDROCHLORIDE 2 MG/1
2 CAPSULE ORAL 4 TIMES DAILY PRN
Status: DISCONTINUED | OUTPATIENT
Start: 2024-12-18 | End: 2024-12-18 | Stop reason: SDUPTHER

## 2024-12-18 RX ORDER — SELENIUM 50 MCG
1 TABLET ORAL DAILY
Qty: 30 CAPSULE | Refills: 1 | Status: SHIPPED | OUTPATIENT
Start: 2024-12-18

## 2024-12-18 RX ORDER — LOPERAMIDE HYDROCHLORIDE 2 MG/1
2 CAPSULE ORAL 4 TIMES DAILY PRN
Status: DISCONTINUED | OUTPATIENT
Start: 2024-12-18 | End: 2024-12-18 | Stop reason: HOSPADM

## 2024-12-18 RX ORDER — BENZOCAINE/MENTHOL 6 MG-10 MG
LOZENGE MUCOUS MEMBRANE
Qty: 453.6 G | Refills: 2 | Status: SHIPPED | OUTPATIENT
Start: 2024-12-18 | End: 2024-12-25

## 2024-12-18 RX ORDER — POTASSIUM CHLORIDE 7.45 MG/ML
10 INJECTION INTRAVENOUS PRN
Status: DISCONTINUED | OUTPATIENT
Start: 2024-12-18 | End: 2024-12-18 | Stop reason: HOSPADM

## 2024-12-18 RX ADMIN — METOPROLOL SUCCINATE 25 MG: 25 TABLET, EXTENDED RELEASE ORAL at 08:14

## 2024-12-18 RX ADMIN — HYDROCORTISONE: 10 CREAM TOPICAL at 08:16

## 2024-12-18 RX ADMIN — SODIUM CHLORIDE, PRESERVATIVE FREE 10 ML: 5 INJECTION INTRAVENOUS at 08:16

## 2024-12-18 RX ADMIN — POTASSIUM CHLORIDE 40 MEQ: 1500 TABLET, EXTENDED RELEASE ORAL at 10:47

## 2024-12-18 RX ADMIN — THERA TABS 1 TABLET: TAB at 08:14

## 2024-12-18 RX ADMIN — LOPERAMIDE HYDROCHLORIDE 2 MG: 2 CAPSULE ORAL at 10:47

## 2024-12-18 RX ADMIN — Medication 1 CAPSULE: at 10:47

## 2024-12-18 NOTE — PROGRESS NOTES
Comprehensive Nutrition Assessment    Type and Reason for Visit:  Reassess    Nutrition Recommendations/Plan:   Continue diet as tolerated  Continue ONS as Clear ONS once a day, frozen ONS once a day.     Malnutrition Assessment:  Malnutrition Status:  At risk for malnutrition (12/18/24 1438)    Context:  Acute Illness (?)     Findings of the 6 clinical characteristics of malnutrition:  Energy Intake:  Mild decrease in energy intake  Weight Loss:  No weight loss (mild weight fluctuation)     Body Fat Loss:  Unable to assess     Muscle Mass Loss:  Unable to assess    Fluid Accumulation:  Unable to assess     Strength:  Not Performed    Nutrition Assessment:    Pt with acute erythroid leukemia, chemotherapy tx started IP. Pt reports appetite is poor r/t having diarrhea which may be 2/2 chemotherapy and ATB. Probiotic started. Pt reports consuming yogurt BID. Ocassionally using Clear ONS and Magic cup, though not every day. Nursing documentation shows 76-00% meal intake.    Nutrition Related Findings:    Meds/labs reviewed. Culturelle. MVI. Wound Type: None       Current Nutrition Intake & Therapies:    Average Meal Intake: 51-75%, %  Average Supplements Intake: 26-50%, 51-75%  ADULT DIET; Regular  ADULT ORAL NUTRITION SUPPLEMENT; Breakfast; Clear Liquid Oral Supplement  ADULT ORAL NUTRITION SUPPLEMENT; Dinner; Frozen Oral Supplement  Additional Calorie Sources:  None    Anthropometric Measures:  Height: 177.8 cm (5' 10\")  Ideal Body Weight (IBW): 166 lbs (75 kg)    Admission Body Weight: 80.5 kg (177 lb 7.5 oz)  Current Body Weight: 76.5 kg (168 lb 10.4 oz), 102.4 % IBW. Weight Source: Bed scale  Current BMI (kg/m2): 24.2  Usual Body Weight: 81.9 kg (180 lb 8.9 oz) (11/1/24)  % Weight Change (Calculated): -5.9  BMI Categories: Normal Weight (BMI 22.0 to 24.9) age over 65    Estimated Daily Nutrient Needs:  Energy Requirements Based On: Kcal/kg  Weight Used for Energy Requirements: Current  Energy (kcal/day):

## 2024-12-18 NOTE — DISCHARGE SUMMARY
Discharge teaching and instructions completed with patient and family using teachback method. AVS reviewed. Patient voiced understanding regarding prescriptions, follow up appointments, and care of self at home. All questions answered. Per Dr. Murphy, PICC line is to be managed by oncology office. Educated patient the dressing is not to get wet nor be removed by patient.    Electronically signed by Shannon Webb RN on 12/18/2024 at 3:32 PM

## 2024-12-18 NOTE — TELEPHONE ENCOUNTER
Name: Osbaldo Duff  : 1955  MRN: [id number]    Oncology Navigation- Initial Note:  (Unknown)  Intake-  Contact Type: Telephone    Continuum of Care: Diagnosis/Active Treatment    Smoking hx:     Notes:   Navigator received referral for navigation and upon review of chart pt. Will F/U with Dr. Issa this Friday. Writer will add self to care team and plan to follow up with pt. By phone tomorrow.     Electronically signed by Brenda Moore RN on 2024 at 4:28 PM

## 2024-12-18 NOTE — DISCHARGE SUMMARY
[facility]  Office: [phone]  Carlos Ramírez DO, Jerry Washington DO, Keo Esquivel DO, Matthieu Steven DO, Janet Barr MD, Ivy Rome MD, Kimberlyn Jenkins MD, Deborah Dunbar MD,  Nick Garcia MD, Carlton Roth MD, Hellen Segura MD,  Cherry Dee DO, Carlos Doe MD, Cullen Damon MD, Alban Ramírez DO, Carol Wyatt MD,  Andrew Plata DO, Juanis Lees MD, Hui Laughlin MD, Mira Gee MD, Sonia Escobedo MD,  Gil Wheat MD, Chi Bang MD, Refugio Elliott MD, Livia Hinojosa MD, Sumit Michel MD, Mika Benjamin MD, Presley Hughes DO, Billy Pederson MD, Shirley Waterhouse, CNP,  Corin Kennedy CNP, Presley Billy, CNP,  Sia Hancock, BESSY, Grace Romo, CNP, Nicole Vargas, CNP, Carolynn Mckinney, CNP, Clau Russell, CNP, Faye Siu PA-C, Elena Rosne PA-C, Mandy Juárez, CNP, Mc Woo, CNP,  Lupe Ramirez, CNP, Valeria Tai, CNP,  Niru Man, CNP, Janessa Carter, CNP         [facility]   IN-PATIENT SERVICE   [facility]    Discharge Summary     Patient ID: Osbaldo Duff  :  1955   MRN: [id number]     ACCOUNT:  [id number]   Patient's PCP: No primary care provider on file.  Admit Date: 2024   Discharge Date: 2024     Length of Stay: 27  Code Status:  Full Code  Admitting Physician: No admitting provider for patient encounter.  Discharge Physician: Carlos Doe MD     Active Discharge Diagnoses:     Hospital Problem Lists:  Principal Problem:    Acute erythroid leukemia (HCC)  Active Problems:    Acute anemia    Thrombocytopenia (HCC)    Acute leukemia not having achieved remission (HCC)    HTN (hypertension)    Hyperuricemia    Admission for chemotherapy    Myelosuppression after chemotherapy    Chronic kidney disease, stage II (mild)    SVT (supraventricular tachycardia) (HCC)    Neutropenia with fever (HCC)    Sepsis due to Escherichia coli with acute renal failure (HCC)    Agranulocytosis  Micro:  CBC:   Lab Results   Component Value Date/Time    WBC 2.2 12/18/2024 06:26 AM    RBC 2.37 12/18/2024 06:26 AM    HGB 7.1 12/18/2024 11:49 AM    HCT 21.4 12/18/2024 11:49 AM    MCV 87.8 12/18/2024 06:26 AM    MCH 30.0 12/18/2024 06:26 AM    MCHC 34.1 12/18/2024 06:26 AM    RDW 13.4 12/18/2024 06:26 AM    PLT See Reflexed IPF Result 12/18/2024 06:26 AM     BMP:    Lab Results   Component Value Date/Time    GLUCOSE 92 12/18/2024 06:26 AM     12/18/2024 06:26 AM    K 3.4 12/18/2024 06:26 AM     12/18/2024 06:26 AM    CO2 20 12/18/2024 06:26 AM    ANIONGAP 8 12/18/2024 06:26 AM    BUN 6 12/18/2024 06:26 AM    CREATININE 0.9 12/18/2024 06:26 AM    CALCIUM 7.5 12/18/2024 06:26 AM    LABGLOM >90 12/18/2024 06:26 AM     HFP:  No components found for: \"AP\", \"ALB\", \"PROT\", \"SGOT\", \"SGPT\", \"TBIL\", \"DBILCALC\"     Radiology:  XR CHEST PORTABLE    Result Date: 12/12/2024  No acute cardiopulmonary process.       Consultations:    Consults:     Final Specialist Recommendations/Findings:   IP CONSULT TO INTERNAL MEDICINE  IP CONSULT TO HEM/ONC  IP CONSULT TO VASCULAR ACCESS TEAM  IP CONSULT TO INFECTIOUS DISEASES  IP CONSULT TO INTERNAL MEDICINE  PHARMACY TO DOSE VANCOMYCIN  IP CONSULT TO VASCULAR ACCESS TEAM  IP CONSULT TO INFECTIOUS DISEASES  IP CONSULT TO VASCULAR ACCESS TEAM  IP CONSULT TO VASCULAR ACCESS TEAM  IP CONSULT TO VASCULAR ACCESS TEAM  IP CONSULT TO VASCULAR ACCESS TEAM      The patient was seen and examined on day of discharge and this discharge summary is in conjunction with any daily progress note from day of discharge.    Discharge plan:     Disposition: Home    Physician Follow Up:     Jack [facility]lego MD  [address] [facility] [facility] [address] [phone]    Schedule an appointment as soon as possible for a visit in 1 week(s)      Maicol Puckett MD  [address]  [address]  [place] [address]  [phone]    Schedule an appointment as soon as possible for a visit in 2 week(s)

## 2024-12-18 NOTE — PLAN OF CARE
Problem: Infection - Adult  Goal: Absence of infection at discharge  12/18/2024 0317 by Leonela Weinberg RN  Outcome: Progressing  12/17/2024 1817 by Ivanna Barba RN  Outcome: Progressing     Problem: Hematologic - Adult  Goal: Maintains hematologic stability  12/18/2024 0317 by Leonela Weinberg RN  Outcome: Progressing  12/17/2024 1817 by Ivanna Barba RN  Outcome: Progressing     Problem: Safety - Adult  Goal: Free from fall injury  12/18/2024 0317 by Leonela Weinberg RN  Outcome: Progressing  12/17/2024 1817 by Ivanna Barba RN  Outcome: Progressing     Problem: Nutrition Deficit:  Goal: Optimize nutritional status  12/18/2024 0317 by Leonela Weinberg RN  Outcome: Progressing  12/17/2024 1817 by Ivanna Barba RN  Outcome: Progressing     Problem: Discharge Planning  Goal: Discharge to home or other facility with appropriate resources  12/18/2024 0317 by Leonela Weinberg RN  Outcome: Progressing  12/17/2024 1817 by Ivanna Barba RN  Outcome: Progressing     Problem: ABCDS Injury Assessment  Goal: Absence of physical injury  Outcome: Progressing     Problem: Pain  Goal: Verbalizes/displays adequate comfort level or baseline comfort level  12/18/2024 0317 by Leonela Weinberg RN  Outcome: Progressing  12/17/2024 1817 by Ivanna Barba RN  Outcome: Progressing     Problem: Skin/Tissue Integrity  Goal: Absence of new skin breakdown  Description: 1.  Monitor for areas of redness and/or skin breakdown  2.  Assess vascular access sites hourly  3.  Every 4-6 hours minimum:  Change oxygen saturation probe site  4.  Every 4-6 hours:  If on nasal continuous positive airway pressure, respiratory therapy assess nares and determine need for appliance change or resting period.  Outcome: Progressing

## 2024-12-18 NOTE — PROGRESS NOTES
Social History:     Social History     Socioeconomic History    Marital status:      Spouse name: Not on file    Number of children: Not on file    Years of education: Not on file    Highest education level: Not on file   Occupational History    Not on file   Tobacco Use    Smoking status: Never    Smokeless tobacco: Never   Vaping Use    Vaping status: Never Used   Substance and Sexual Activity    Alcohol use: Not Currently    Drug use: Never    Sexual activity: Never   Other Topics Concern    Not on file   Social History Narrative    Not on file     Social Determinants of Health     Financial Resource Strain: Not on file   Food Insecurity: No Food Insecurity (11/22/2024)    Hunger Vital Sign     Worried About Running Out of Food in the Last Year: Never true     Ran Out of Food in the Last Year: Never true   Transportation Needs: No Transportation Needs (11/22/2024)    PRAPARE - Transportation     Lack of Transportation (Medical): No     Lack of Transportation (Non-Medical): No   Physical Activity: Not on file   Stress: Not on file   Social Connections: Not on file   Intimate Partner Violence: Not on file   Housing Stability: Low Risk  (11/22/2024)    Housing Stability Vital Sign     Unable to Pay for Housing in the Last Year: No     Number of Times Moved in the Last Year: 0     Homeless in the Last Year: No       Family History:     Family History   Problem Relation Age of Onset    Stroke Mother     Stroke Father         Allergies:   Patient has no known allergies.       Medical Decision Making-Imaging:   XR CHEST PORTABLE    Result Date: 12/2/2024  EXAMINATION: ONE XRAY VIEW OF THE CHEST 12/2/2024 12:25 am COMPARISON: Chest radiograph 11/01/2024. HISTORY: ORDERING SYSTEM PROVIDED HISTORY: fever TECHNOLOGIST PROVIDED HISTORY: fever Reason for Exam: fever  upright port FINDINGS: A left upper extremity PICC terminates in the lower superior vena cava.  The cardiomediastinal silhouette is within normal  limits.  No pneumothorax, vascular congestion, consolidation, or pleural effusion is identified.  No acute osseous abnormality.     1. No acute cardiopulmonary process. 2. Left upper extremity PICC terminates in the lower superior vena cava.       Medical Decision Nkfqcm-Dbxobech-Csbzv:     Results       Procedure Component Value Units Date/Time    Gastrointestinal Panel, Molecular [[id number]] Collected: 12/17/24 1924    Order Status: Sent Specimen: Gastrointestinal sample from Stool Updated: 12/17/24 1924    Infectious Disease Intervention [[id number]]     Order Status: Sent     Culture, Blood 2 [[id number]] Collected: 12/12/24 0740    Order Status: Completed Specimen: Blood Updated: 12/17/24 0816     Specimen Description .BLOOD     Special Requests L HAND 2 ML     Culture NO GROWTH 5 DAYS    Culture, Blood 1 [[id number]] Collected: 12/12/24 0737    Order Status: Completed Specimen: Blood Updated: 12/17/24 0816     Specimen Description .BLOOD     Special Requests R ARM 7 ML     Culture NO GROWTH 5 DAYS    Culture, Urine [[id number]]     Order Status: No result Specimen: Urine, clean catch               Medical Decision Making-Other:     Note:      Thank you for allowing us to participate in the care of this patient. Please call with questions.      NOEMI TANNER          ATTESTATION:    I have discussed the case, including pertinent history and exam findings with the medical resident. I have evaluated the  History, physical findings and pictures of the patient and the key elements of the encounter have been performed by me. I have reviewed the laboratory data, other diagnostic studies and discussed them with the medical resident. I have updated the medical record where necessary.    I agree with the assessment, plan and orders as documented by the medical resident and I have modified them as necessary.       Maicol Puckett MD.            12/18/2024

## 2024-12-18 NOTE — CARE COORDINATION
Met with the patient and his wife. Informed he has a discharge order. Informed him he can coordinate the discharge with his nurse - she will review the AVS with him and his family.    Discharge Report    [facility]  Clinical Case Management Department  Written by: MANDA HARRIS    Patient Name: Osbaldo Duff  Attending Provider: Carlos Doe MD  Admit Date: 2024  2:53 PM  MRN: [id number]  Account: [id number]                     : 1955  Discharge Date:       Disposition: home    MANDA HARRIS

## 2024-12-18 NOTE — PROGRESS NOTES
[facility]  Office: [phone]  Carlos Ramírez DO, Jerry Washington DO, Keo Esquivel DO, Matthieu Steven DO, Janet Barr MD, Ivy Rome MD, Kimberlyn Jenkins MD, Deborah Dunbar MD,  Nick Garcia MD, Carlton Roth MD, Hellen Segura MD,  Cherry Dee DO, Carlos Doe MD, Cullen Damon MD, Alban Ramírez DO, Carol Wyatt MD,  Andrew Plata DO, Juanis Lees MD, Hui Laughlin MD, Mira Gee MD, Sonia Escobedo MD,  Gil Wheat MD, Chi Bang MD, Refugio Elliott MD, Livia Hinojosa MD, Sumit Michel MD, Mika Benjamin MD, Presley Hughes DO, Billy Pederson MD, Shirley Waterhouse, CNP,  Corin Kennedy CNP, Presley Billy, JIMENEZ,  Sia Hancock, BESSY, Grace Romo, CNP, Nicole Vargas, CNP, Carolynn Mckinney, CNP, Clau Russell, CNP, Faye Siu PA-C, Elena Rosen PA-C, Mandy Juárez, JIMENEZ, Mc Woo, CNP,  Lupe Ramirez, CNP, Valeria Tai, CNP,  Niru Man, CNP, Janessa Carter, CNP         [facility]   IN-PATIENT SERVICE   [facility]    Progress Note    12/18/2024    1:40 PM    Name:   Osbaldo Duff  MRN:     [id number]     Acct:      [id number]   Room:   0439/0439-01   Day:  27  Admit Date:  11/21/2024  2:53 PM    PCP:   No primary care provider on file.  Code Status:  Full Code    Subjective:     C/C:   Chief Complaint   Patient presents with    Abnormal Lab     Interval History Status: not changed.     Patient seen and examined.  Rash itching is better with steroid cream. He reports diarrhea but molecular panel negative. Probiotics added.  No chest pain or sob  Labs and vitals reviewed  Repeat hb stable  Brief History:     This a 69-year-old male with a significant past medical history of hypertension, CKD who initially presented emergency room with fatigue and low-grade fevers sent over by his primary care provider. He was found to be profoundly pancytopenic with bone marrow biopsy completed on 11/11/2024 given unclear    CREATININE 0.8 0.9 0.9   MG  --  1.7 1.9   ANIONGAP 7* 8* 8*   LABGLOM >90 >90 >90   CALCIUM 7.6* 7.7* 7.5*   No results for input(s): \"LABALBU\", \"LABA1C\", \"D4ZHZFT\", \"FT4\", \"TSH\", \"AST\", \"ALT\", \"LDH\", \"GGT\", \"ALKPHOS\", \"BILITOT\", \"BILIDIR\", \"AMMONIA\", \"AMYLASE\", \"LIPASE\", \"LACTATE\", \"CHOL\", \"HDL\", \"CHOLHDLRATIO\", \"TRIG\", \"VLDL\", \"LQF67MJ\", \"PHENYTOIN\", \"PHENYF\", \"URICACID\", \"POCGLU\" in the last 72 hours.    Invalid input(s): \"PROT\", \"Z3ELSMH\", \"LABGGT\", \"LDLCHOLESTEROL\"  ABG:  Lab Results   Component Value Date/Time    POCPH 7.474 12/02/2024 12:16 AM    POCPCO2 19.0 12/02/2024 12:16 AM    POCPO2 70.6 12/02/2024 12:16 AM    POCHCO3 14.0 12/02/2024 12:16 AM    NBEA 8.7 12/02/2024 12:16 AM    NAGS4PVE 95.6 12/02/2024 12:16 AM    FIO2 INFORMATION NOT PROVIDED 12/02/2024 10:04 AM     Lab Results   Component Value Date/Time    SPECIAL L HAND 2 ML 12/12/2024 07:40 AM     Lab Results   Component Value Date/Time    CULTURE NO GROWTH 5 DAYS 12/12/2024 07:40 AM       Radiology:  XR CHEST PORTABLE    Result Date: 12/12/2024  No acute cardiopulmonary process.       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

## 2024-12-18 NOTE — PROGRESS NOTES
Today's Date: 12/18/2024  Patient Name: Osbaldo Duff  Date of admission: 11/21/2024  2:53 PM  Patient's age: 69 y.o., 1955  Admission Dx: Acute leukemia, in relapse (HCC) [C95.02]  Acute leukemia not having achieved remission (HCC) [C95.00]    Reason for Consult: management recommendations  Requesting Physician: No admitting provider for patient encounter.    Chief Complaint: Pancytopenia secondary to acute leukemia relapse    Interval Changes:  -Patient awake, alert, oriented X4.  Under no acute distress.  -Hemodynamically stable breathing on room air  -Latest labs show sodium 137, potassium 3.4, BUN 6, creatinine 0.9, GFR 90, glucose 92, WBC count 2.2 increased from 1.9, hemoglobin 7.1 decreased from 7.3, platelet count 25 increased from 24  -Received total 3 units of platelet transfusion on 16th, 13th, and 12th along with 1 unit PRBC on 15th.  No platelet transfusion on 17th and 18th.  -No episode of fever overnight  -Denies shortness of breath, chest pain, abdominal pain.  Does complain of loose stools.  -Currently on vancomycin and aztreonam.  Cefepime was discontinued considering right-sided rash.  Completed acyclovir on 12/12/2024.    History of Present Illness:    Osbaldo Duff is a 69 y.o. male who is admitted to the hospital on 11/21/2024  for anemia, low hb and acute leukemia diagnosis     Patient was following with Dr. Gallego as outpatient for possible suspected bone marrow pathology including MDS/leukemia.     Bone marrow biopsy was performed on 11/11/2024 and was unclear diagnosis here therefore was sent to [facility] which confirmed the diagnosis of acute erythroid leukemia.     Patient has pancytopenia.  Patient is now sent to ER for further management and likely administration of induction chemotherapy.    Past Medical History:   has a past medical history of Acute erythroid leukemia (HCC), Anemia, Thrombocytopenia (HCC), and Wears dentures.    Past Surgical History:   has  transfusion before the weekend.  Will make arrangements.  All explained to the patient and the family.  He agreed.  Family will also meet the  at the cancer center in regard to future treatment for his AML.                              Chris Murphy MD                          [facility]                            This note is created with the assistance of a speech recognition program.  While intending to generate a document that actually reflects the content of the visit, the document can still have some errors including those of syntax and sound a like substitutions which may escape proof reading.  It such instances, actual meaning can be extrapolated by contextual diversion.

## 2024-12-18 NOTE — PLAN OF CARE
Problem: Infection - Adult  Goal: Absence of infection at discharge  12/18/2024 1220 by Shannon Webb RN  Outcome: Completed  12/18/2024 0317 by Leonela Weinberg RN  Outcome: Progressing     Problem: Hematologic - Adult  Goal: Maintains hematologic stability  12/18/2024 1220 by Shannon Webb RN  Outcome: Completed  12/18/2024 0317 by Leonela Weinberg RN  Outcome: Progressing     Problem: Safety - Adult  Goal: Free from fall injury  12/18/2024 1220 by Shannon Webb RN  Outcome: Completed  12/18/2024 0317 by Leonela Weinberg RN  Outcome: Progressing     Problem: Nutrition Deficit:  Goal: Optimize nutritional status  12/18/2024 1220 by Shannon Webb RN  Outcome: Completed  12/18/2024 0317 by Leonela Weinberg RN  Outcome: Progressing     Problem: Discharge Planning  Goal: Discharge to home or other facility with appropriate resources  12/18/2024 1220 by Shannon Webb RN  Outcome: Completed  12/18/2024 0317 by Leonela Weinberg RN  Outcome: Progressing     Problem: ABCDS Injury Assessment  Goal: Absence of physical injury  12/18/2024 1220 by Shannon Webb RN  Outcome: Completed  12/18/2024 0317 by Leonela Weinberg RN  Outcome: Progressing     Problem: Pain  Goal: Verbalizes/displays adequate comfort level or baseline comfort level  12/18/2024 1220 by Shannon Webb RN  Outcome: Completed  12/18/2024 0317 by Leonela Weinberg RN  Outcome: Progressing     Problem: Skin/Tissue Integrity  Goal: Absence of new skin breakdown  Description: 1.  Monitor for areas of redness and/or skin breakdown  2.  Assess vascular access sites hourly  3.  Every 4-6 hours minimum:  Change oxygen saturation probe site  4.  Every 4-6 hours:  If on nasal continuous positive airway pressure, respiratory therapy assess nares and determine need for appliance change or resting period.  12/18/2024 1220 by Shannon Webb RN  Outcome: Completed  12/18/2024 0317 by Leonela Weinberg RN  Outcome: Progressing

## 2024-12-19 ENCOUNTER — TELEPHONE (OUTPATIENT)
Dept: ONCOLOGY | Age: 69
End: 2024-12-19

## 2024-12-19 NOTE — TELEPHONE ENCOUNTER
Name: Osbaldo Duff  : 1955  MRN: [id number]    Oncology Navigation- Initial Note:    Intake-  Contact Type: Telephone    Continuum of Care: Diagnosis/Active Treatment    Smoking hx:     Notes:   Navigator spoke with daughter Deya and daughter has questions regarding out of pocket expenses. Writer introducing self and VM left for Sabiha to address concerns. Writer will plan to meet with pt. Tomorrow.     Electronically signed by Brenda Moore RN on 2024 at 3:24 PM

## 2024-12-20 ENCOUNTER — HOSPITAL ENCOUNTER (OUTPATIENT)
Age: 69
Discharge: HOME | End: 2024-12-20
Payer: COMMERCIAL

## 2024-12-20 ENCOUNTER — TELEPHONE (OUTPATIENT)
Dept: ONCOLOGY | Age: 69
End: 2024-12-20

## 2024-12-20 ENCOUNTER — OFFICE VISIT (OUTPATIENT)
Dept: ONCOLOGY | Age: 69
End: 2024-12-20
Payer: COMMERCIAL

## 2024-12-20 VITALS
WEIGHT: 169 LBS | SYSTOLIC BLOOD PRESSURE: 111 MMHG | HEART RATE: 109 BPM | DIASTOLIC BLOOD PRESSURE: 70 MMHG | TEMPERATURE: 98.5 F | BODY MASS INDEX: 24.25 KG/M2 | RESPIRATION RATE: 16 BRPM

## 2024-12-20 DIAGNOSIS — C95.00 ACUTE LEUKEMIA NOT HAVING ACHIEVED REMISSION (HCC): Primary | ICD-10-CM

## 2024-12-20 DIAGNOSIS — D64.9 ACUTE ANEMIA: ICD-10-CM

## 2024-12-20 DIAGNOSIS — C92.00 ACUTE MYELOID LEUKEMIA NOT HAVING ACHIEVED REMISSION (HCC): Primary | ICD-10-CM

## 2024-12-20 DIAGNOSIS — D69.6 THROMBOCYTOPENIA (HCC): ICD-10-CM

## 2024-12-20 LAB
BASOPHILS # BLD: 0.05 K/UL (ref 0–0.2)
BASOPHILS NFR BLD: 1 %
EOSINOPHIL # BLD: 0 K/UL (ref 0–0.4)
EOSINOPHILS RELATIVE PERCENT: 0 % (ref 1–4)
ERYTHROCYTE [DISTWIDTH] IN BLOOD BY AUTOMATED COUNT: 13.6 % (ref 11.8–14.4)
HCT VFR BLD AUTO: 23.7 % (ref 40.7–50.3)
HGB BLD-MCNC: 8 G/DL (ref 13–17)
IMM GRANULOCYTES # BLD AUTO: 0.21 K/UL (ref 0–0.3)
IMM GRANULOCYTES NFR BLD: 4 %
LYMPHOCYTES NFR BLD: 0.8 K/UL (ref 1–4.8)
LYMPHOCYTES RELATIVE PERCENT: 15 % (ref 24–44)
MCH RBC QN AUTO: 30 PG (ref 25.2–33.5)
MCHC RBC AUTO-ENTMCNC: 33.3 G/DL (ref 28.4–34.8)
MCV RBC AUTO: 90.1 FL (ref 82.6–102.9)
MONOCYTES NFR BLD: 0.95 K/UL (ref 0.2–0.8)
MONOCYTES NFR BLD: 18 % (ref 1–7)
NEUTROPHILS NFR BLD: 62 % (ref 36–66)
NEUTS SEG NFR BLD: 3.29 K/UL (ref 1.8–7.7)
NRBC BLD-RTO: 0 PER 100 WBC
PLATELET # BLD AUTO: ABNORMAL K/UL (ref 138–453)
PLATELET, FLUORESCENCE: 43 K/UL (ref 138–453)
PLATELETS.RETICULATED NFR BLD AUTO: 18.1 % (ref 1.1–10.3)
RBC # BLD AUTO: 2.63 M/UL (ref 4.21–5.77)
WBC OTHER # BLD: 5.3 K/UL (ref 3.5–11.3)

## 2024-12-20 PROCEDURE — 1123F ACP DISCUSS/DSCN MKR DOCD: CPT | Performed by: INTERNAL MEDICINE

## 2024-12-20 PROCEDURE — 3078F DIAST BP <80 MM HG: CPT | Performed by: INTERNAL MEDICINE

## 2024-12-20 PROCEDURE — 36415 COLL VENOUS BLD VENIPUNCTURE: CPT

## 2024-12-20 PROCEDURE — 85025 COMPLETE CBC W/AUTO DIFF WBC: CPT

## 2024-12-20 PROCEDURE — 99215 OFFICE O/P EST HI 40 MIN: CPT | Performed by: INTERNAL MEDICINE

## 2024-12-20 PROCEDURE — 3074F SYST BP LT 130 MM HG: CPT | Performed by: INTERNAL MEDICINE

## 2024-12-20 PROCEDURE — 85055 RETICULATED PLATELET ASSAY: CPT

## 2024-12-20 PROCEDURE — 99211 OFF/OP EST MAY X REQ PHY/QHP: CPT | Performed by: INTERNAL MEDICINE

## 2024-12-20 RX ORDER — DIPHENOXYLATE HYDROCHLORIDE AND ATROPINE SULFATE 2.5; .025 MG/1; MG/1
1 TABLET ORAL 4 TIMES DAILY PRN
Qty: 40 TABLET | Refills: 0 | Status: SHIPPED | OUTPATIENT
Start: 2024-12-20 | End: 2024-12-30

## 2024-12-20 NOTE — TELEPHONE ENCOUNTER
VALERIA HERE FOR FOLLOW UP   NEW ORDER PENDING ANDREAS MENA VISIT: 12/30/24 @ 8:15AM   AVS PRINTED AND GIVEN ON EXIT

## 2024-12-20 NOTE — TELEPHONE ENCOUNTER
Name: Osbaldo Duff  : 1955  MRN: [id number]    Oncology Navigation- Initial Note:    Intake-  Contact Type: Telephone    Continuum of Care: Diagnosis/Active Treatment    Smoking hx:       Notes:   Navigator met with pt. Face to face along with family present. Pt. Will follow with Dr. Gallego in the future. Writer noting Dr. Gallego has placed orders for Veneroclax and Azactidine. Writer explaining process for prior auth to family. Pt. May be candidate for Taxify JUDI Pt. On STD now, but worried about out of pocket expenses. Pt. And family given Sherry's LSW card and writer left VM for Sherry. Await authorization for scheduling of Tx. Due to Holidays may need to split up injections?    Electronically signed by Brenda Moore RN on 2024 at 11:56 AM

## 2024-12-20 NOTE — PROGRESS NOTES
Osbaldo Duff                                                                                                                  12/20/2024  MRN:   [id number]  YOB: 1955  PCP:                           Barbara Daley APRN - CNP  Referring Physician: No ref. 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  History was obtained through an .  Patient understand very limited English.  Patient has not seen a doctor for a while.  Was seen by primary care provider due to complaints of fatigue also having low-grade fever.  Lab workup showed significantly abnormalities including severe thrombocytopenia and anemia subsequently patient sent to the ER.     Patient denies any weight loss swollen glands.  Does complain of having viral syndrome like symptoms over the last 1 week.  Denies noticing any bleeding.  Denies cough.  Patient does not smoke.  Drinks alcohol rarely.  Bone marrow aspiration and biopsy showed erythroid leukemia with high risk cytogenetics.  Patient is admitted to the hospital and received 3+7 induction chemotherapy.  Patient tolerated chemotherapy fairly well but developed fever and diarrhea.  Bone marrow aspiration and biopsy were done on day #19 and showed persistent leukemia but down to about 20 to 30% from a baseline of 80%.  Will discuss second induction therapy versus switching to Vidaza plus venetoclax and the patient chose the second

## 2024-12-23 ENCOUNTER — TELEPHONE (OUTPATIENT)
Dept: ONCOLOGY | Age: 69
End: 2024-12-23

## 2024-12-23 LAB — INTERVENTION: NORMAL

## 2024-12-23 NOTE — TELEPHONE ENCOUNTER
Name: Osbaldo Duff  : 1955  MRN: [id number]    Oncology Navigation Follow-Up Note    Contact Type:  Telephone    Notes:   Navigator checking on auth for Venetoclax and Vidaza, both pending.      Electronically signed by Brenda Moore RN on 2024 at 8:59 AM

## 2024-12-26 ENCOUNTER — TELEPHONE (OUTPATIENT)
Dept: ONCOLOGY | Age: 69
End: 2024-12-26

## 2024-12-26 DIAGNOSIS — C92.00 ACUTE MYELOID LEUKEMIA NOT HAVING ACHIEVED REMISSION (HCC): ICD-10-CM

## 2024-12-26 NOTE — TELEPHONE ENCOUNTER
Name: Osbaldo Duff  : 1955  MRN: [id number]    Oncology Navigation Follow-Up Note    Contact Type:  Telephone    Notes:   Navigator spoke with Anthony at  and asking to have pts. Tx added to  appt.? Anthony will speak with Corinna and return writers callAmarilis Mann off this week.      Electronically signed by Brenda Moore RN on 2024 at 3:24 PM

## 2024-12-27 DIAGNOSIS — C92.00 ACUTE MYELOID LEUKEMIA NOT HAVING ACHIEVED REMISSION (HCC): Primary | ICD-10-CM

## 2024-12-30 ENCOUNTER — HOSPITAL ENCOUNTER (OUTPATIENT)
Age: 69
Setting detail: SPECIMEN
Discharge: HOME | End: 2024-12-30
Payer: COMMERCIAL

## 2024-12-30 ENCOUNTER — TELEPHONE (OUTPATIENT)
Dept: ONCOLOGY | Age: 69
End: 2024-12-30

## 2024-12-30 ENCOUNTER — HOSPITAL ENCOUNTER (OUTPATIENT)
Dept: INFUSION THERAPY | Facility: MEDICAL CENTER | Age: 69
Discharge: HOME | End: 2024-12-30

## 2024-12-30 ENCOUNTER — OFFICE VISIT (OUTPATIENT)
Dept: ONCOLOGY | Age: 69
End: 2024-12-30
Payer: COMMERCIAL

## 2024-12-30 VITALS
RESPIRATION RATE: 16 BRPM | HEART RATE: 115 BPM | DIASTOLIC BLOOD PRESSURE: 76 MMHG | WEIGHT: 159.3 LBS | TEMPERATURE: 98.6 F | BODY MASS INDEX: 22.86 KG/M2 | SYSTOLIC BLOOD PRESSURE: 112 MMHG

## 2024-12-30 DIAGNOSIS — C95.00 ACUTE LEUKEMIA NOT HAVING ACHIEVED REMISSION (HCC): Primary | ICD-10-CM

## 2024-12-30 DIAGNOSIS — C95.00 ACUTE LEUKEMIA NOT HAVING ACHIEVED REMISSION (HCC): ICD-10-CM

## 2024-12-30 DIAGNOSIS — C92.00 ACUTE MYELOID LEUKEMIA NOT HAVING ACHIEVED REMISSION (HCC): ICD-10-CM

## 2024-12-30 DIAGNOSIS — R19.7 DIARRHEA, UNSPECIFIED TYPE: ICD-10-CM

## 2024-12-30 LAB
ALBUMIN SERPL-MCNC: 3.4 G/DL (ref 3.5–5.2)
ALBUMIN/GLOB SERPL: 0.9 {RATIO} (ref 1–2.5)
ALP SERPL-CCNC: 55 U/L (ref 40–129)
ALT SERPL-CCNC: 25 U/L (ref 10–50)
ANION GAP SERPL CALCULATED.3IONS-SCNC: 11 MMOL/L (ref 9–16)
AST SERPL-CCNC: 20 U/L (ref 10–50)
BASOPHILS # BLD: 0.23 K/UL (ref 0–0.2)
BASOPHILS NFR BLD: 2 % (ref 0–2)
BILIRUB SERPL-MCNC: 0.3 MG/DL (ref 0–1.2)
BUN SERPL-MCNC: 11 MG/DL (ref 8–23)
CALCIUM SERPL-MCNC: 8.8 MG/DL (ref 8.8–10.2)
CHLORIDE SERPL-SCNC: 103 MMOL/L (ref 98–107)
CO2 SERPL-SCNC: 22 MMOL/L (ref 20–31)
CREAT SERPL-MCNC: 1.1 MG/DL (ref 0.7–1.2)
EOSINOPHIL # BLD: 0.34 K/UL (ref 0–0.44)
EOSINOPHILS RELATIVE PERCENT: 3 % (ref 1–4)
ERYTHROCYTE [DISTWIDTH] IN BLOOD BY AUTOMATED COUNT: 14.2 % (ref 11.8–14.4)
GFR, ESTIMATED: 76 ML/MIN/1.73M2
GLUCOSE SERPL-MCNC: 126 MG/DL (ref 82–115)
HCT VFR BLD AUTO: 23.7 % (ref 40.7–50.3)
HGB BLD-MCNC: 7.7 G/DL (ref 13–17)
IMM GRANULOCYTES # BLD AUTO: 1.25 K/UL (ref 0–0.3)
IMM GRANULOCYTES NFR BLD: 11 %
LYMPHOCYTES NFR BLD: 1.82 K/UL (ref 1.1–3.7)
LYMPHOCYTES RELATIVE PERCENT: 16 % (ref 24–43)
MCH RBC QN AUTO: 29.6 PG (ref 25.2–33.5)
MCHC RBC AUTO-ENTMCNC: 32.2 G/DL (ref 28.4–34.8)
MCV RBC AUTO: 91.8 FL (ref 82.6–102.9)
MONOCYTES NFR BLD: 1.37 K/UL (ref 0.1–1.2)
MONOCYTES NFR BLD: 12 % (ref 3–12)
NEUTROPHILS NFR BLD: 56 % (ref 36–65)
NEUTS SEG NFR BLD: 6.39 K/UL (ref 1.5–8.1)
NRBC BLD-RTO: 0 PER 100 WBC
PLATELET # BLD AUTO: 62 K/UL (ref 138–453)
PMV BLD AUTO: 12.8 FL (ref 8.1–13.5)
POTASSIUM SERPL-SCNC: 3.4 MMOL/L (ref 3.7–5.3)
PROT SERPL-MCNC: 7.3 G/DL (ref 6.6–8.7)
RBC # BLD AUTO: 2.57 M/UL (ref 4.21–5.77)
SODIUM SERPL-SCNC: 135 MMOL/L (ref 136–145)
WBC OTHER # BLD: 11.4 K/UL (ref 3.5–11.3)

## 2024-12-30 PROCEDURE — 99215 OFFICE O/P EST HI 40 MIN: CPT | Performed by: INTERNAL MEDICINE

## 2024-12-30 PROCEDURE — 3078F DIAST BP <80 MM HG: CPT | Performed by: INTERNAL MEDICINE

## 2024-12-30 PROCEDURE — 99211 OFF/OP EST MAY X REQ PHY/QHP: CPT | Performed by: INTERNAL MEDICINE

## 2024-12-30 PROCEDURE — 3074F SYST BP LT 130 MM HG: CPT | Performed by: INTERNAL MEDICINE

## 2024-12-30 PROCEDURE — 80053 COMPREHEN METABOLIC PANEL: CPT

## 2024-12-30 PROCEDURE — 1123F ACP DISCUSS/DSCN MKR DOCD: CPT | Performed by: INTERNAL MEDICINE

## 2024-12-30 PROCEDURE — 36415 COLL VENOUS BLD VENIPUNCTURE: CPT

## 2024-12-30 PROCEDURE — 85025 COMPLETE CBC W/AUTO DIFF WBC: CPT

## 2024-12-30 RX ORDER — CHOLESTYRAMINE 4 G/9G
1 POWDER, FOR SUSPENSION ORAL 3 TIMES DAILY
Qty: 90 PACKET | Refills: 0 | Status: SHIPPED | OUTPATIENT
Start: 2024-12-30

## 2024-12-30 NOTE — TELEPHONE ENCOUNTER
Name: Osbaldo Duff  : 1955  MRN: [id number]    Oncology Navigation Follow-Up Note    Contact Type:  Telephone    Notes:   Navigator met with pt. And family face to face. Dr. Gallego changing Tx to weekly Vidaza and once weekly veneclexta. PICC line removed today. Writer sending email to Joyce Bermudez about script change.   Writer left  for Sia MCKEON for evaluation of pt. For supplements. No samples available at this time. Await approval for oral and injections.      Electronically signed by Brenda Moore RN on 2024 at 10:18 AM

## 2024-12-30 NOTE — PROGRESS NOTES
Patient arrives via wheelchair after MD visit in front office for PICC line removal per MD orders.  Patients PICC site unremarkable; no redness, swelling or signs of infection.  PICC line removed while patient held breath with no issues; catheter completely intact and removed fully. Pressure held on site, then covered with gauze and pressure dressing. Patient instructed to keep dressing on until tomorrow due to lower platelets. They verbalize understanding.  Patient discharged with instructions to grab AVS in front office.

## 2024-12-30 NOTE — TELEPHONE ENCOUNTER
VALERIA HERE FOR MD VISIT & TX  New chemo drug ordered , needs urgent approval   Can we remove the picc line ?   Continue weekly cbc   Rv with c 1 d 1  NEW ORDER IS PENDING PRECERT  MD VISIT C1D1  AVS PRINTED W/ INSTRUCTIONS AND GIVEN TO PT ON EXIT

## 2024-12-30 NOTE — PATIENT INSTRUCTIONS
New chemo drug ordered , needs urgent approval   Can we remove the picc line ?   Continue weekly cbc   Rv with c 1 d 1

## 2024-12-30 NOTE — PROGRESS NOTES
versus switching to Vidaza plus venetoclax and the patient chose the second option    Interim History:  Patient presents to the clinic for follow-up visit.  Patient still having diarrhea.  Patient is in a wheelchair still feels weak.  He has lost weight.  Cell counts have improved patient has not required blood transfusion.  He is wondering when he can have the PICC removed.  He has not received the shipment of the venetoclax yet.    During this visit patient's allergy, social, medical, surgical history and medications were reviewed and updated.    Past Medical History:   Hypertension    Past Surgical History:  No pertinent surgical history    Patient Family Social History:  Family History   Problem Relation Age of Onset    Stroke Mother     Stroke Father        Social History     Socioeconomic History    Marital status:      Spouse name: None    Number of children: None    Years of education: None    Highest education level: None   Tobacco Use    Smoking status: Never    Smokeless tobacco: Never   Vaping Use    Vaping status: Never Used   Substance and Sexual Activity    Alcohol use: Not Currently    Drug use: Never    Sexual activity: Never     Social Determinants of Health     Food Insecurity: No Food Insecurity (11/22/2024)    Hunger Vital Sign     Worried About Running Out of Food in the Last Year: Never true     Ran Out of Food in the Last Year: Never true   Transportation Needs: No Transportation Needs (11/22/2024)    PRAPARE - Transportation     Lack of Transportation (Medical): No     Lack of Transportation (Non-Medical): No   Housing Stability: Low Risk  (11/22/2024)    Housing Stability Vital Sign     Unable to Pay for Housing in the Last Year: No     Number of Times Moved in the Last Year: 0     Homeless in the Last Year: No     Current Medications:  Current Outpatient Medications   Medication Sig Dispense Refill    cholestyramine (QUESTRAN) 4 g packet Take 1 packet by mouth 3 times daily 90

## 2025-01-01 ENCOUNTER — CLINICAL DOCUMENTATION (OUTPATIENT)
Facility: HOSPITAL | Age: 70
End: 2025-01-01

## 2025-01-01 ENCOUNTER — HOSPITAL ENCOUNTER (INPATIENT)
Age: 70
LOS: 1 days | DRG: 064 | End: 2025-01-16
Attending: EMERGENCY MEDICINE | Admitting: STUDENT IN AN ORGANIZED HEALTH CARE EDUCATION/TRAINING PROGRAM
Payer: COMMERCIAL

## 2025-01-01 ENCOUNTER — HOSPITAL ENCOUNTER (OUTPATIENT)
Dept: INFUSION THERAPY | Facility: MEDICAL CENTER | Age: 70
End: 2025-01-01

## 2025-01-01 ENCOUNTER — APPOINTMENT (OUTPATIENT)
Dept: CT IMAGING | Age: 70
DRG: 064 | End: 2025-01-01
Payer: COMMERCIAL

## 2025-01-01 ENCOUNTER — HOSPITAL ENCOUNTER (OUTPATIENT)
Facility: MEDICAL CENTER | Age: 70
End: 2025-01-01

## 2025-01-01 ENCOUNTER — OFFICE VISIT (OUTPATIENT)
Dept: ONCOLOGY | Age: 70
End: 2025-01-01
Payer: COMMERCIAL

## 2025-01-01 ENCOUNTER — HOSPITAL ENCOUNTER (OUTPATIENT)
Dept: OBSERVATION | Age: 70
Discharge: HOME | End: 2025-01-13

## 2025-01-01 ENCOUNTER — TELEPHONE (OUTPATIENT)
Dept: ONCOLOGY | Age: 70
End: 2025-01-01

## 2025-01-01 ENCOUNTER — ANESTHESIA EVENT (OUTPATIENT)
Dept: OPERATING ROOM | Age: 70
DRG: 064 | End: 2025-01-01
Payer: COMMERCIAL

## 2025-01-01 ENCOUNTER — ANESTHESIA (OUTPATIENT)
Dept: OPERATING ROOM | Age: 70
DRG: 064 | End: 2025-01-01
Payer: COMMERCIAL

## 2025-01-01 ENCOUNTER — APPOINTMENT (OUTPATIENT)
Dept: GENERAL RADIOLOGY | Age: 70
DRG: 064 | End: 2025-01-01
Payer: COMMERCIAL

## 2025-01-01 ENCOUNTER — HOSPITAL ENCOUNTER (OUTPATIENT)
Dept: INFUSION THERAPY | Facility: MEDICAL CENTER | Age: 70
Discharge: HOME | End: 2025-01-13
Payer: COMMERCIAL

## 2025-01-01 ENCOUNTER — TELEPHONE (OUTPATIENT)
Dept: INFUSION THERAPY | Facility: MEDICAL CENTER | Age: 70
End: 2025-01-01

## 2025-01-01 ENCOUNTER — HOSPITAL ENCOUNTER (OUTPATIENT)
Facility: MEDICAL CENTER | Age: 70
Discharge: HOME | End: 2025-01-13
Payer: COMMERCIAL

## 2025-01-01 VITALS
WEIGHT: 157.5 LBS | DIASTOLIC BLOOD PRESSURE: 77 MMHG | BODY MASS INDEX: 22.6 KG/M2 | HEART RATE: 113 BPM | SYSTOLIC BLOOD PRESSURE: 133 MMHG | TEMPERATURE: 98.9 F

## 2025-01-01 VITALS
SYSTOLIC BLOOD PRESSURE: 114 MMHG | DIASTOLIC BLOOD PRESSURE: 76 MMHG | TEMPERATURE: 98.9 F | HEART RATE: 108 BPM | RESPIRATION RATE: 18 BRPM | OXYGEN SATURATION: 96 %

## 2025-01-01 VITALS
OXYGEN SATURATION: 100 % | TEMPERATURE: 100.1 F | SYSTOLIC BLOOD PRESSURE: 43 MMHG | HEART RATE: 135 BPM | DIASTOLIC BLOOD PRESSURE: 20 MMHG | RESPIRATION RATE: 14 BRPM

## 2025-01-01 DIAGNOSIS — D69.6 THROMBOCYTOPENIA (HCC): ICD-10-CM

## 2025-01-01 DIAGNOSIS — S06.5XAA SUBDURAL HEMATOMA: ICD-10-CM

## 2025-01-01 DIAGNOSIS — D64.9 ACUTE ANEMIA: ICD-10-CM

## 2025-01-01 DIAGNOSIS — R41.82 ALTERED MENTAL STATUS, UNSPECIFIED ALTERED MENTAL STATUS TYPE: Primary | ICD-10-CM

## 2025-01-01 DIAGNOSIS — C95.00 ACUTE LEUKEMIA NOT HAVING ACHIEVED REMISSION (HCC): Primary | ICD-10-CM

## 2025-01-01 DIAGNOSIS — C95.00 ACUTE LEUKEMIA NOT HAVING ACHIEVED REMISSION (HCC): ICD-10-CM

## 2025-01-01 LAB
ABO/RH: NORMAL
ALBUMIN SERPL-MCNC: 3.8 G/DL (ref 3.5–5.2)
ALBUMIN SERPL-MCNC: 4 G/DL (ref 3.5–5.2)
ALBUMIN/GLOB SERPL: 1.2 {RATIO} (ref 1–2.5)
ALBUMIN/GLOB SERPL: 1.2 {RATIO} (ref 1–2.5)
ALLEN TEST: POSITIVE
ALP SERPL-CCNC: 61 U/L (ref 40–129)
ALP SERPL-CCNC: 63 U/L (ref 40–129)
ALT SERPL-CCNC: 17 U/L (ref 10–50)
ALT SERPL-CCNC: 20 U/L (ref 10–50)
AMMONIA PLAS-SCNC: <10 UMOL/L (ref 16–60)
ANION GAP SERPL CALCULATED.3IONS-SCNC: 13 MMOL/L (ref 9–16)
ANION GAP SERPL CALCULATED.3IONS-SCNC: 14 MMOL/L (ref 9–16)
ANTIBODY SCREEN: NEGATIVE
ARM BAND NUMBER: NORMAL
AST SERPL-CCNC: 17 U/L (ref 10–50)
AST SERPL-CCNC: 19 U/L (ref 10–50)
BACTERIA URNS QL MICRO: ABNORMAL
BASOPHILS # BLD: 0 K/UL (ref 0–0.2)
BASOPHILS # BLD: 0 K/UL (ref 0–0.2)
BASOPHILS # BLD: 0.1 K/UL (ref 0–0.2)
BASOPHILS NFR BLD: 0 % (ref 0–2)
BASOPHILS NFR BLD: 0 % (ref 0–2)
BASOPHILS NFR BLD: 1 %
BILIRUB SERPL-MCNC: 0.4 MG/DL (ref 0–1.2)
BILIRUB SERPL-MCNC: 0.5 MG/DL (ref 0–1.2)
BILIRUB UR QL STRIP: NEGATIVE
BLOOD BANK DISPENSE STATUS: NORMAL
BLOOD BANK SAMPLE EXPIRATION: NORMAL
BPU ID: NORMAL
BUN BLD-MCNC: 18 MG/DL (ref 8–26)
BUN SERPL-MCNC: 19 MG/DL (ref 8–23)
BUN SERPL-MCNC: 21 MG/DL (ref 8–23)
CA-I BLD-SCNC: 1.44 MMOL/L (ref 1.15–1.33)
CALCIUM SERPL-MCNC: 9.6 MG/DL (ref 8.8–10.2)
CALCIUM SERPL-MCNC: 9.8 MG/DL (ref 8.6–10.4)
CASTS #/AREA URNS LPF: ABNORMAL /LPF (ref 0–8)
CHLORIDE BLD-SCNC: 106 MMOL/L (ref 98–107)
CHLORIDE SERPL-SCNC: 105 MMOL/L (ref 98–107)
CHLORIDE SERPL-SCNC: 105 MMOL/L (ref 98–107)
CLARITY UR: CLEAR
CO2 BLD CALC-SCNC: 19 MMOL/L (ref 22–30)
CO2 SERPL-SCNC: 17 MMOL/L (ref 20–31)
CO2 SERPL-SCNC: 18 MMOL/L (ref 20–31)
COLOR UR: YELLOW
COMPONENT: NORMAL
CREAT SERPL-MCNC: 1 MG/DL (ref 0.7–1.2)
CREAT SERPL-MCNC: 1.1 MG/DL (ref 0.7–1.2)
CROSSMATCH RESULT: NORMAL
CRP SERPL HS-MCNC: 7.2 MG/L (ref 0–5)
EGFR, POC: 73 ML/MIN/1.73M2
EOSINOPHIL # BLD: 0 K/UL (ref 0–0.4)
EOSINOPHIL # BLD: 0.55 K/UL (ref 0–0.44)
EOSINOPHIL # BLD: 0.61 K/UL (ref 0–0.4)
EOSINOPHILS RELATIVE PERCENT: 0 % (ref 1–4)
EOSINOPHILS RELATIVE PERCENT: 5 % (ref 1–4)
EOSINOPHILS RELATIVE PERCENT: 6 % (ref 1–4)
EPI CELLS #/AREA URNS HPF: ABNORMAL /HPF (ref 0–5)
ERYTHROCYTE [DISTWIDTH] IN BLOOD BY AUTOMATED COUNT: 14.4 % (ref 11.8–14.4)
ERYTHROCYTE [DISTWIDTH] IN BLOOD BY AUTOMATED COUNT: 14.6 % (ref 11.8–14.4)
ERYTHROCYTE [DISTWIDTH] IN BLOOD BY AUTOMATED COUNT: 14.6 % (ref 11.8–14.4)
ERYTHROCYTE [DISTWIDTH] IN BLOOD BY AUTOMATED COUNT: 14.8 % (ref 11.8–14.4)
FIBRINOGEN PPP-MCNC: 246 MG/DL (ref 203–521)
GFR, ESTIMATED: 73 ML/MIN/1.73M2
GFR, ESTIMATED: 81 ML/MIN/1.73M2
GLUCOSE BLD-MCNC: 151 MG/DL (ref 75–110)
GLUCOSE BLD-MCNC: 204 MG/DL (ref 74–100)
GLUCOSE BLD-MCNC: 228 MG/DL (ref 74–100)
GLUCOSE BLD-MCNC: 240 MG/DL (ref 75–110)
GLUCOSE SERPL-MCNC: 118 MG/DL (ref 82–115)
GLUCOSE SERPL-MCNC: 207 MG/DL (ref 74–99)
GLUCOSE UR STRIP-MCNC: NEGATIVE MG/DL
HCO3 VENOUS: 20.1 MMOL/L (ref 22–29)
HCT VFR BLD AUTO: 16.2 % (ref 40.7–50.3)
HCT VFR BLD AUTO: 17 % (ref 40.7–50.3)
HCT VFR BLD AUTO: 18.5 % (ref 40.7–50.3)
HCT VFR BLD AUTO: 19 % (ref 41–53)
HCT VFR BLD AUTO: 20.1 % (ref 40.7–50.3)
HGB BLD-MCNC: 5.1 G/DL (ref 13–17)
HGB BLD-MCNC: 6 G/DL (ref 13–17)
HGB BLD-MCNC: 6.2 G/DL (ref 13–17)
HGB BLD-MCNC: 6.7 G/DL (ref 13–17)
HGB UR QL STRIP.AUTO: ABNORMAL
IMM GRANULOCYTES # BLD AUTO: 0.21 K/UL (ref 0–0.3)
IMM GRANULOCYTES # BLD AUTO: 0.44 K/UL (ref 0–0.3)
IMM GRANULOCYTES # BLD AUTO: 0.61 K/UL (ref 0–0.3)
IMM GRANULOCYTES NFR BLD: 1 %
IMM GRANULOCYTES NFR BLD: 4 %
IMM GRANULOCYTES NFR BLD: 6 %
INR PPP: 1.5
KETONES UR STRIP-MCNC: NEGATIVE MG/DL
LACTIC ACID, SEPSIS WHOLE BLOOD: 3.4 MMOL/L (ref 0.5–1.9)
LACTIC ACID, SEPSIS WHOLE BLOOD: 4.5 MMOL/L (ref 0.5–1.9)
LACTIC ACID, WHOLE BLOOD: 5.9 MMOL/L (ref 0.7–2.1)
LEUKOCYTE ESTERASE UR QL STRIP: NEGATIVE
LYMPHOCYTES NFR BLD: 2.24 K/UL (ref 1–4.8)
LYMPHOCYTES NFR BLD: 2.31 K/UL (ref 1.1–3.7)
LYMPHOCYTES NFR BLD: 2.57 K/UL (ref 1–4.8)
LYMPHOCYTES RELATIVE PERCENT: 12 % (ref 24–44)
LYMPHOCYTES RELATIVE PERCENT: 21 % (ref 24–43)
LYMPHOCYTES RELATIVE PERCENT: 22 % (ref 24–44)
MAGNESIUM SERPL-MCNC: 1.7 MG/DL (ref 1.6–2.4)
MCH RBC QN AUTO: 28.5 PG (ref 25.2–33.5)
MCH RBC QN AUTO: 28.8 PG (ref 25.2–33.5)
MCH RBC QN AUTO: 29.8 PG (ref 25.2–33.5)
MCH RBC QN AUTO: 30 PG (ref 25.2–33.5)
MCHC RBC AUTO-ENTMCNC: 31.5 G/DL (ref 28.4–34.8)
MCHC RBC AUTO-ENTMCNC: 33.3 G/DL (ref 28.4–34.8)
MCHC RBC AUTO-ENTMCNC: 33.5 G/DL (ref 28.4–34.8)
MCHC RBC AUTO-ENTMCNC: 35.3 G/DL (ref 28.4–34.8)
MCV RBC AUTO: 85 FL (ref 82.6–102.9)
MCV RBC AUTO: 86 FL (ref 82.6–102.9)
MCV RBC AUTO: 89.3 FL (ref 82.6–102.9)
MCV RBC AUTO: 90.5 FL (ref 82.6–102.9)
MONOCYTES NFR BLD: 0.64 K/UL (ref 0.1–0.8)
MONOCYTES NFR BLD: 0.77 K/UL (ref 0.1–1.2)
MONOCYTES NFR BLD: 0.82 K/UL (ref 0.2–0.8)
MONOCYTES NFR BLD: 3 % (ref 1–7)
MONOCYTES NFR BLD: 7 % (ref 3–12)
MONOCYTES NFR BLD: 8 % (ref 1–7)
MORPHOLOGY: ABNORMAL
MORPHOLOGY: ABNORMAL
NEGATIVE BASE EXCESS, ART: 7.5 MMOL/L (ref 0–2)
NEGATIVE BASE EXCESS, VEN: 5.3 MMOL/L (ref 0–2)
NEUTROPHILS NFR BLD: 57 % (ref 36–66)
NEUTROPHILS NFR BLD: 63 % (ref 36–65)
NEUTROPHILS NFR BLD: 84 % (ref 36–66)
NEUTS SEG NFR BLD: 17.98 K/UL (ref 1.8–7.7)
NEUTS SEG NFR BLD: 5.82 K/UL (ref 1.8–7.7)
NEUTS SEG NFR BLD: 6.93 K/UL (ref 1.5–8.1)
NITRITE UR QL STRIP: NEGATIVE
NRBC BLD-RTO: 0 PER 100 WBC
O2 DELIVERY DEVICE: ABNORMAL
O2 SAT, VEN: 30.6 % (ref 60–85)
PCO2 VENOUS: 37.9 MM HG (ref 41–51)
PH UR STRIP: 5 [PH] (ref 5–8)
PH VENOUS: 7.33 (ref 7.32–7.43)
PHOSPHATE SERPL-MCNC: 3.2 MG/DL (ref 2.5–4.5)
PLATELET # BLD AUTO: ABNORMAL K/UL (ref 138–453)
PLATELET, FLUORESCENCE: 41 K/UL (ref 138–453)
PLATELET, FLUORESCENCE: 6 K/UL (ref 138–453)
PLATELET, FLUORESCENCE: 60 K/UL (ref 138–453)
PLATELET, FLUORESCENCE: 8 K/UL (ref 138–453)
PLATELETS.RETICULATED NFR BLD AUTO: 15.1 % (ref 1.1–10.3)
PLATELETS.RETICULATED NFR BLD AUTO: 2 % (ref 1.1–10.3)
PLATELETS.RETICULATED NFR BLD AUTO: 2.2 % (ref 1.1–10.3)
PLATELETS.RETICULATED NFR BLD AUTO: 23 % (ref 1.1–10.3)
PO2 VENOUS: 20.7 MM HG (ref 30–50)
POC ANION GAP: 15 MMOL/L (ref 7–16)
POC CREATININE: 1.1 MG/DL (ref 0.51–1.19)
POC HCO3: 17.2 MMOL/L (ref 21–28)
POC HEMOGLOBIN (CALC): 6.5 G/DL (ref 13.5–17.5)
POC LACTIC ACID: 4 MMOL/L (ref 0.56–1.39)
POC LACTIC ACID: 4.2 MMOL/L (ref 0.56–1.39)
POC O2 SATURATION: 100 % (ref 94–98)
POC PCO2: 30.1 MM HG (ref 35–48)
POC PH: 7.37 (ref 7.35–7.45)
POC PO2: 599.5 MM HG (ref 83–108)
POTASSIUM BLD-SCNC: 4 MMOL/L (ref 3.5–4.5)
POTASSIUM SERPL-SCNC: 3.9 MMOL/L (ref 3.7–5.3)
POTASSIUM SERPL-SCNC: 4.1 MMOL/L (ref 3.7–5.3)
PROCALCITONIN SERPL-MCNC: 0.41 NG/ML (ref 0–0.09)
PROT SERPL-MCNC: 7.1 G/DL (ref 6.6–8.7)
PROT SERPL-MCNC: 7.3 G/DL (ref 6.6–8.7)
PROT UR STRIP-MCNC: ABNORMAL MG/DL
PROTHROMBIN TIME: 17.4 SEC (ref 11.7–14.9)
RBC # BLD AUTO: 1.79 M/UL (ref 4.21–5.77)
RBC # BLD AUTO: 2 M/UL (ref 4.21–5.77)
RBC # BLD AUTO: 2.15 M/UL (ref 4.21–5.77)
RBC # BLD AUTO: 2.25 M/UL (ref 4.21–5.77)
RBC #/AREA URNS HPF: ABNORMAL /HPF (ref 0–4)
SAMPLE SITE: ABNORMAL
SODIUM BLD-SCNC: 139 MMOL/L (ref 138–146)
SODIUM SERPL-SCNC: 135 MMOL/L (ref 136–145)
SODIUM SERPL-SCNC: 137 MMOL/L (ref 136–145)
SODIUM SERPL-SCNC: 140 MMOL/L (ref 136–145)
SP GR UR STRIP: 1.01 (ref 1–1.03)
TRANSFUSION STATUS: NORMAL
TROPONIN I SERPL HS-MCNC: 100 NG/L (ref 0–22)
TROPONIN I SERPL HS-MCNC: 191 NG/L (ref 0–22)
UNIT DIVISION: 0
URATE SERPL-MCNC: 6 MG/DL (ref 3.4–7)
UROBILINOGEN UR STRIP-ACNC: NORMAL EU/DL (ref 0–1)
WBC #/AREA URNS HPF: ABNORMAL /HPF (ref 0–5)
WBC OTHER # BLD: 10.2 K/UL (ref 3.5–11.3)
WBC OTHER # BLD: 11 K/UL (ref 3.5–11.3)
WBC OTHER # BLD: 18.9 K/UL (ref 3.5–11.3)
WBC OTHER # BLD: 21.4 K/UL (ref 3.5–11.3)

## 2025-01-01 PROCEDURE — 84550 ASSAY OF BLOOD/URIC ACID: CPT

## 2025-01-01 PROCEDURE — 2500000003 HC RX 250 WO HCPCS

## 2025-01-01 PROCEDURE — 71045 X-RAY EXAM CHEST 1 VIEW: CPT

## 2025-01-01 PROCEDURE — 94002 VENT MGMT INPAT INIT DAY: CPT

## 2025-01-01 PROCEDURE — 82803 BLOOD GASES ANY COMBINATION: CPT

## 2025-01-01 PROCEDURE — 84295 ASSAY OF SERUM SODIUM: CPT

## 2025-01-01 PROCEDURE — 80051 ELECTROLYTE PANEL: CPT

## 2025-01-01 PROCEDURE — 3078F DIAST BP <80 MM HG: CPT | Performed by: INTERNAL MEDICINE

## 2025-01-01 PROCEDURE — 36600 WITHDRAWAL OF ARTERIAL BLOOD: CPT

## 2025-01-01 PROCEDURE — 96367 TX/PROPH/DG ADDL SEQ IV INF: CPT

## 2025-01-01 PROCEDURE — 2700000000 HC OXYGEN THERAPY PER DAY

## 2025-01-01 PROCEDURE — P9073 PLATELETS PHERESIS PATH REDU: HCPCS

## 2025-01-01 PROCEDURE — 84484 ASSAY OF TROPONIN QUANT: CPT

## 2025-01-01 PROCEDURE — 0BH17EZ INSERTION OF ENDOTRACHEAL AIRWAY INTO TRACHEA, VIA NATURAL OR ARTIFICIAL OPENING: ICD-10-PCS | Performed by: STUDENT IN AN ORGANIZED HEALTH CARE EDUCATION/TRAINING PROGRAM

## 2025-01-01 PROCEDURE — 1123F ACP DISCUSS/DSCN MKR DOCD: CPT | Performed by: INTERNAL MEDICINE

## 2025-01-01 PROCEDURE — 5A1935Z RESPIRATORY VENTILATION, LESS THAN 24 CONSECUTIVE HOURS: ICD-10-PCS | Performed by: STUDENT IN AN ORGANIZED HEALTH CARE EDUCATION/TRAINING PROGRAM

## 2025-01-01 PROCEDURE — 36430 TRANSFUSION BLD/BLD COMPNT: CPT

## 2025-01-01 PROCEDURE — 6360000002 HC RX W HCPCS

## 2025-01-01 PROCEDURE — 84520 ASSAY OF UREA NITROGEN: CPT

## 2025-01-01 PROCEDURE — 99285 EMERGENCY DEPT VISIT HI MDM: CPT

## 2025-01-01 PROCEDURE — 85610 PROTHROMBIN TIME: CPT

## 2025-01-01 PROCEDURE — 99291 CRITICAL CARE FIRST HOUR: CPT | Performed by: STUDENT IN AN ORGANIZED HEALTH CARE EDUCATION/TRAINING PROGRAM

## 2025-01-01 PROCEDURE — 82140 ASSAY OF AMMONIA: CPT

## 2025-01-01 PROCEDURE — 3075F SYST BP GE 130 - 139MM HG: CPT | Performed by: INTERNAL MEDICINE

## 2025-01-01 PROCEDURE — 36415 COLL VENOUS BLD VENIPUNCTURE: CPT

## 2025-01-01 PROCEDURE — 99214 OFFICE O/P EST MOD 30 MIN: CPT | Performed by: INTERNAL MEDICINE

## 2025-01-01 PROCEDURE — 87040 BLOOD CULTURE FOR BACTERIA: CPT

## 2025-01-01 PROCEDURE — P9016 RBC LEUKOCYTES REDUCED: HCPCS

## 2025-01-01 PROCEDURE — 99211 OFF/OP EST MAY X REQ PHY/QHP: CPT | Performed by: INTERNAL MEDICINE

## 2025-01-01 PROCEDURE — 6360000002 HC RX W HCPCS: Performed by: REGISTERED NURSE

## 2025-01-01 PROCEDURE — 81001 URINALYSIS AUTO W/SCOPE: CPT

## 2025-01-01 PROCEDURE — 2000000000 HC ICU R&B

## 2025-01-01 PROCEDURE — 70450 CT HEAD/BRAIN W/O DYE: CPT

## 2025-01-01 PROCEDURE — 82330 ASSAY OF CALCIUM: CPT

## 2025-01-01 PROCEDURE — 82565 ASSAY OF CREATININE: CPT

## 2025-01-01 PROCEDURE — 86850 RBC ANTIBODY SCREEN: CPT

## 2025-01-01 PROCEDURE — 99222 1ST HOSP IP/OBS MODERATE 55: CPT

## 2025-01-01 PROCEDURE — 96375 TX/PRO/DX INJ NEW DRUG ADDON: CPT

## 2025-01-01 PROCEDURE — 70496 CT ANGIOGRAPHY HEAD: CPT

## 2025-01-01 PROCEDURE — 71260 CT THORAX DX C+: CPT

## 2025-01-01 PROCEDURE — 96365 THER/PROPH/DIAG IV INF INIT: CPT

## 2025-01-01 PROCEDURE — 3E043XZ INTRODUCTION OF VASOPRESSOR INTO CENTRAL VEIN, PERCUTANEOUS APPROACH: ICD-10-PCS

## 2025-01-01 PROCEDURE — 86900 BLOOD TYPING SEROLOGIC ABO: CPT

## 2025-01-01 PROCEDURE — 6360000002 HC RX W HCPCS: Performed by: NURSE PRACTITIONER

## 2025-01-01 PROCEDURE — 99211 OFF/OP EST MAY X REQ PHY/QHP: CPT

## 2025-01-01 PROCEDURE — 85055 RETICULATED PLATELET ASSAY: CPT

## 2025-01-01 PROCEDURE — 86901 BLOOD TYPING SEROLOGIC RH(D): CPT

## 2025-01-01 PROCEDURE — 83605 ASSAY OF LACTIC ACID: CPT

## 2025-01-01 PROCEDURE — 2500000003 HC RX 250 WO HCPCS: Performed by: NURSE PRACTITIONER

## 2025-01-01 PROCEDURE — 85025 COMPLETE CBC W/AUTO DIFF WBC: CPT

## 2025-01-01 PROCEDURE — 82947 ASSAY GLUCOSE BLOOD QUANT: CPT

## 2025-01-01 PROCEDURE — 85384 FIBRINOGEN ACTIVITY: CPT

## 2025-01-01 PROCEDURE — 6360000004 HC RX CONTRAST MEDICATION: Performed by: EMERGENCY MEDICINE

## 2025-01-01 PROCEDURE — 2580000003 HC RX 258: Performed by: REGISTERED NURSE

## 2025-01-01 PROCEDURE — 93005 ELECTROCARDIOGRAM TRACING: CPT

## 2025-01-01 PROCEDURE — 80053 COMPREHEN METABOLIC PANEL: CPT

## 2025-01-01 PROCEDURE — 86923 COMPATIBILITY TEST ELECTRIC: CPT

## 2025-01-01 PROCEDURE — 87086 URINE CULTURE/COLONY COUNT: CPT

## 2025-01-01 PROCEDURE — 83735 ASSAY OF MAGNESIUM: CPT

## 2025-01-01 PROCEDURE — 93005 ELECTROCARDIOGRAM TRACING: CPT | Performed by: NURSE PRACTITIONER

## 2025-01-01 PROCEDURE — 6370000000 HC RX 637 (ALT 250 FOR IP): Performed by: NURSE PRACTITIONER

## 2025-01-01 PROCEDURE — 84100 ASSAY OF PHOSPHORUS: CPT

## 2025-01-01 PROCEDURE — 85027 COMPLETE CBC AUTOMATED: CPT

## 2025-01-01 PROCEDURE — 86140 C-REACTIVE PROTEIN: CPT

## 2025-01-01 PROCEDURE — 94761 N-INVAS EAR/PLS OXIMETRY MLT: CPT

## 2025-01-01 PROCEDURE — 84145 PROCALCITONIN (PCT): CPT

## 2025-01-01 PROCEDURE — 51702 INSERT TEMP BLADDER CATH: CPT

## 2025-01-01 PROCEDURE — 85014 HEMATOCRIT: CPT

## 2025-01-01 PROCEDURE — 2580000003 HC RX 258: Performed by: NURSE PRACTITIONER

## 2025-01-01 RX ORDER — LORAZEPAM 2 MG/ML
1 INJECTION INTRAMUSCULAR ONCE
Status: COMPLETED | OUTPATIENT
Start: 2025-01-01 | End: 2025-01-01

## 2025-01-01 RX ORDER — LORAZEPAM 0.5 MG/1
1 TABLET ORAL ONCE
Status: DISCONTINUED | OUTPATIENT
Start: 2025-01-01 | End: 2025-01-01

## 2025-01-01 RX ORDER — MORPHINE SULFATE 2 MG/ML
2 INJECTION, SOLUTION INTRAMUSCULAR; INTRAVENOUS
Status: DISCONTINUED | OUTPATIENT
Start: 2025-01-01 | End: 2025-01-17 | Stop reason: HOSPADM

## 2025-01-01 RX ORDER — ACETAMINOPHEN 650 MG/1
650 SUPPOSITORY RECTAL EVERY 6 HOURS PRN
Status: DISCONTINUED | OUTPATIENT
Start: 2025-01-01 | End: 2025-01-17 | Stop reason: HOSPADM

## 2025-01-01 RX ORDER — DEXTROSE MONOHYDRATE 100 MG/ML
INJECTION, SOLUTION INTRAVENOUS CONTINUOUS PRN
Status: DISCONTINUED | OUTPATIENT
Start: 2025-01-01 | End: 2025-01-17 | Stop reason: HOSPADM

## 2025-01-01 RX ORDER — LEVETIRACETAM 500 MG/5ML
500 INJECTION, SOLUTION, CONCENTRATE INTRAVENOUS EVERY 12 HOURS
Status: DISCONTINUED | OUTPATIENT
Start: 2025-01-01 | End: 2025-01-17 | Stop reason: HOSPADM

## 2025-01-01 RX ORDER — SODIUM CHLORIDE 9 MG/ML
INJECTION, SOLUTION INTRAVENOUS PRN
Status: DISCONTINUED | OUTPATIENT
Start: 2025-01-01 | End: 2025-01-01 | Stop reason: HOSPADM

## 2025-01-01 RX ORDER — LORAZEPAM 2 MG/ML
0.5 INJECTION INTRAMUSCULAR
Status: DISCONTINUED | OUTPATIENT
Start: 2025-01-01 | End: 2025-01-17 | Stop reason: HOSPADM

## 2025-01-01 RX ORDER — GLYCOPYRROLATE 0.2 MG/ML
0.2 INJECTION INTRAMUSCULAR; INTRAVENOUS EVERY 4 HOURS PRN
Status: DISCONTINUED | OUTPATIENT
Start: 2025-01-01 | End: 2025-01-17 | Stop reason: HOSPADM

## 2025-01-01 RX ORDER — MIDAZOLAM HYDROCHLORIDE 2 MG/2ML
1 INJECTION, SOLUTION INTRAMUSCULAR; INTRAVENOUS ONCE
Status: COMPLETED | OUTPATIENT
Start: 2025-01-01 | End: 2025-01-01

## 2025-01-01 RX ORDER — SODIUM CHLORIDE 0.9 % (FLUSH) 0.9 %
5-40 SYRINGE (ML) INJECTION EVERY 12 HOURS SCHEDULED
Status: DISCONTINUED | OUTPATIENT
Start: 2025-01-01 | End: 2025-01-17 | Stop reason: HOSPADM

## 2025-01-01 RX ORDER — ONDANSETRON 2 MG/ML
INJECTION INTRAMUSCULAR; INTRAVENOUS
Status: DISPENSED
Start: 2025-01-01 | End: 2025-01-01

## 2025-01-01 RX ORDER — PROPOFOL 10 MG/ML
INJECTION, EMULSION INTRAVENOUS
Status: COMPLETED
Start: 2025-01-01 | End: 2025-01-01

## 2025-01-01 RX ORDER — SODIUM CHLORIDE 9 MG/ML
INJECTION, SOLUTION INTRAVENOUS PRN
Status: DISCONTINUED | OUTPATIENT
Start: 2025-01-01 | End: 2025-01-17 | Stop reason: HOSPADM

## 2025-01-01 RX ORDER — LABETALOL HYDROCHLORIDE 5 MG/ML
10 INJECTION, SOLUTION INTRAVENOUS ONCE
Status: COMPLETED | OUTPATIENT
Start: 2025-01-01 | End: 2025-01-01

## 2025-01-01 RX ORDER — MANNITOL 20 G/100ML
1 INJECTION, SOLUTION INTRAVENOUS ONCE
Status: DISCONTINUED | OUTPATIENT
Start: 2025-01-01 | End: 2025-01-01

## 2025-01-01 RX ORDER — METOPROLOL TARTRATE 1 MG/ML
5 INJECTION, SOLUTION INTRAVENOUS ONCE
Status: DISCONTINUED | OUTPATIENT
Start: 2025-01-01 | End: 2025-01-01

## 2025-01-01 RX ORDER — SODIUM CHLORIDE 9 MG/ML
INJECTION, SOLUTION INTRAVENOUS PRN
Status: DISCONTINUED | OUTPATIENT
Start: 2025-01-01 | End: 2025-01-01

## 2025-01-01 RX ORDER — NICARDIPINE HYDROCHLORIDE 0.1 MG/ML
2.5-15 INJECTION INTRAVENOUS CONTINUOUS
Status: DISCONTINUED | OUTPATIENT
Start: 2025-01-01 | End: 2025-01-01

## 2025-01-01 RX ORDER — IOPAMIDOL 755 MG/ML
75 INJECTION, SOLUTION INTRAVASCULAR
Status: COMPLETED | OUTPATIENT
Start: 2025-01-01 | End: 2025-01-01

## 2025-01-01 RX ORDER — TRANEXAMIC ACID 10 MG/ML
1000 INJECTION, SOLUTION INTRAVENOUS ONCE
Status: COMPLETED | OUTPATIENT
Start: 2025-01-01 | End: 2025-01-01

## 2025-01-01 RX ORDER — PHENYLEPHRINE HCL IN 0.9% NACL 50MG/250ML
10-300 PLASTIC BAG, INJECTION (ML) INTRAVENOUS CONTINUOUS
Status: DISCONTINUED | OUTPATIENT
Start: 2025-01-01 | End: 2025-01-01

## 2025-01-01 RX ORDER — ONDANSETRON 4 MG/1
4 TABLET, FILM COATED ORAL EVERY 8 HOURS PRN
Qty: 90 TABLET | Refills: 2 | Status: SHIPPED | OUTPATIENT
Start: 2025-01-01 | End: 2025-01-17

## 2025-01-01 RX ORDER — ACETAMINOPHEN 325 MG/1
650 TABLET ORAL EVERY 6 HOURS PRN
Status: DISCONTINUED | OUTPATIENT
Start: 2025-01-01 | End: 2025-01-17 | Stop reason: HOSPADM

## 2025-01-01 RX ORDER — SODIUM CHLORIDE 0.9 % (FLUSH) 0.9 %
5-40 SYRINGE (ML) INJECTION PRN
Status: DISCONTINUED | OUTPATIENT
Start: 2025-01-01 | End: 2025-01-17 | Stop reason: HOSPADM

## 2025-01-01 RX ORDER — GLUCAGON 1 MG/ML
1 KIT INJECTION PRN
Status: DISCONTINUED | OUTPATIENT
Start: 2025-01-01 | End: 2025-01-17 | Stop reason: HOSPADM

## 2025-01-01 RX ORDER — NOREPINEPHRINE BITARTRATE 0.06 MG/ML
1-100 INJECTION, SOLUTION INTRAVENOUS CONTINUOUS
Status: DISCONTINUED | OUTPATIENT
Start: 2025-01-01 | End: 2025-01-01

## 2025-01-01 RX ORDER — SODIUM CHLORIDE 9 MG/ML
INJECTION, SOLUTION INTRAVENOUS CONTINUOUS
Status: DISCONTINUED | OUTPATIENT
Start: 2025-01-01 | End: 2025-01-17 | Stop reason: HOSPADM

## 2025-01-01 RX ORDER — PROPOFOL 10 MG/ML
5-50 INJECTION, EMULSION INTRAVENOUS CONTINUOUS
Status: DISCONTINUED | OUTPATIENT
Start: 2025-01-01 | End: 2025-01-01

## 2025-01-01 RX ORDER — INSULIN LISPRO 100 [IU]/ML
0-8 INJECTION, SOLUTION INTRAVENOUS; SUBCUTANEOUS EVERY 6 HOURS
Status: DISCONTINUED | OUTPATIENT
Start: 2025-01-01 | End: 2025-01-01

## 2025-01-01 RX ORDER — LEVETIRACETAM 5 MG/ML
500 INJECTION INTRAVASCULAR EVERY 12 HOURS
Status: DISCONTINUED | OUTPATIENT
Start: 2025-01-01 | End: 2025-01-01

## 2025-01-01 RX ORDER — ONDANSETRON 2 MG/ML
4 INJECTION INTRAMUSCULAR; INTRAVENOUS EVERY 6 HOURS PRN
Status: DISCONTINUED | OUTPATIENT
Start: 2025-01-01 | End: 2025-01-17 | Stop reason: HOSPADM

## 2025-01-01 RX ORDER — ONDANSETRON 4 MG/1
4 TABLET, ORALLY DISINTEGRATING ORAL EVERY 8 HOURS PRN
Status: DISCONTINUED | OUTPATIENT
Start: 2025-01-01 | End: 2025-01-17 | Stop reason: HOSPADM

## 2025-01-01 RX ORDER — FENTANYL CITRATE 50 UG/ML
25 INJECTION, SOLUTION INTRAMUSCULAR; INTRAVENOUS
Status: DISCONTINUED | OUTPATIENT
Start: 2025-01-01 | End: 2025-01-01

## 2025-01-01 RX ORDER — CHLORHEXIDINE GLUCONATE ORAL RINSE 1.2 MG/ML
15 SOLUTION DENTAL 2 TIMES DAILY
Status: DISCONTINUED | OUTPATIENT
Start: 2025-01-01 | End: 2025-01-17 | Stop reason: HOSPADM

## 2025-01-01 RX ORDER — NOREPINEPHRINE BITARTRATE 0.06 MG/ML
INJECTION, SOLUTION INTRAVENOUS
Status: COMPLETED
Start: 2025-01-01 | End: 2025-01-01

## 2025-01-01 RX ADMIN — Medication 50 MCG/HR: at 04:05

## 2025-01-01 RX ADMIN — SODIUM CHLORIDE, PRESERVATIVE FREE 10 ML: 5 INJECTION INTRAVENOUS at 15:14

## 2025-01-01 RX ADMIN — SODIUM CHLORIDE: 9 INJECTION, SOLUTION INTRAVENOUS at 12:10

## 2025-01-01 RX ADMIN — DESMOPRESSIN ACETATE 28.56 MCG: 4 SOLUTION INTRAVENOUS at 06:18

## 2025-01-01 RX ADMIN — SODIUM CHLORIDE 250 ML: 3 INJECTION, SOLUTION INTRAVENOUS at 09:25

## 2025-01-01 RX ADMIN — PROPOFOL 20 MCG/KG/MIN: 10 INJECTION, EMULSION INTRAVENOUS at 04:16

## 2025-01-01 RX ADMIN — ACETAMINOPHEN 650 MG: 325 TABLET ORAL at 14:13

## 2025-01-01 RX ADMIN — IOPAMIDOL 75 ML: 755 INJECTION, SOLUTION INTRAVENOUS at 04:59

## 2025-01-01 RX ADMIN — MIDAZOLAM HYDROCHLORIDE 1 MG: 1 INJECTION, SOLUTION INTRAMUSCULAR; INTRAVENOUS at 04:47

## 2025-01-01 RX ADMIN — LORAZEPAM 1 MG: 2 INJECTION INTRAMUSCULAR; INTRAVENOUS at 04:29

## 2025-01-01 RX ADMIN — TRANEXAMIC ACID 1000 MG: 10 INJECTION, SOLUTION INTRAVENOUS at 05:59

## 2025-01-01 RX ADMIN — FENTANYL CITRATE 25 MCG: 50 INJECTION, SOLUTION INTRAMUSCULAR; INTRAVENOUS at 15:13

## 2025-01-01 RX ADMIN — Medication 10 MCG/MIN: at 05:55

## 2025-01-01 RX ADMIN — NICARDIPINE HYDROCHLORIDE 5 MG/HR: 0.1 INJECTION INTRAVENOUS at 15:10

## 2025-01-01 RX ADMIN — Medication 10 MG: at 05:43

## 2025-01-01 RX ADMIN — NOREPINEPHRINE BITARTRATE 10 MCG/MIN: 0.06 INJECTION, SOLUTION INTRAVENOUS at 05:55

## 2025-01-01 ASSESSMENT — PULMONARY FUNCTION TESTS
PIF_VALUE: 14
PIF_VALUE: 13
PIF_VALUE: 9
PIF_VALUE: 15
PIF_VALUE: 15
PIF_VALUE: 11
PIF_VALUE: 14
PIF_VALUE: 10
PIF_VALUE: 12

## 2025-01-03 ENCOUNTER — TELEPHONE (OUTPATIENT)
Dept: ONCOLOGY | Age: 70
End: 2025-01-03

## 2025-01-03 NOTE — TELEPHONE ENCOUNTER
Name: Osbaldo Duff  : 1955  MRN: [id number]    Oncology Navigation Follow-Up Note    Contact Type:  Telephone    Notes:   Navigator received call from REBEKAH and Yannick will arrive Tuesday. REBEKAH asking about Vidaa? Joyce Bermudez sending email questioning weekly and if pt. Is taking allopurinol? Valeria is aware of questions and will clarify with Dr. Gallego. Await authorization.       Electronically signed by Brenda Moore RN on 1/3/2025 at 1:47 PM

## 2025-01-06 RX ORDER — ALLOPURINOL 300 MG/1
300 TABLET ORAL DAILY
Qty: 90 TABLET | Refills: 3 | Status: SHIPPED | OUTPATIENT
Start: 2025-01-06

## 2025-01-06 NOTE — TELEPHONE ENCOUNTER
Name: Osbaldo Duff  : 1955  MRN: [id number]    Oncology Navigation Follow-Up Note    Contact Type:  Telephone    Notes:   Navigator noting Tx approved and pt. To receive oral drug Tuesday  Please schedule pt. For C-1 D-1, Dr. Gallego does not need to see pt. To start, then we can work pt. Back to Monday for F/U      Electronically signed by Brenda Moore RN on 2025 at 1:11 PM

## 2025-01-10 NOTE — TELEPHONE ENCOUNTER
RN check new chemotherapy orders per Marimar      Pathology 11/1/24 Acute Leukemia      28 day cycle Dacogen      Ht - 177.8 cm  Wt - 72.3 kg  BSA = 1.89      Cycle 1-4    Dacogen 0.2 mg/kg = 14.46 mg rounded to 15 mg SQ day 1,8,15,22.      Labs to epic. Chart noted and sent to front office for processing. Note to pool for 2nd RN check.

## 2025-01-10 NOTE — TELEPHONE ENCOUNTER
Name: Osbaldo Duff  : 1955  MRN: [id number]    Oncology Navigation Follow-Up Note    Contact Type:  Telephone    Notes:   Navigator received call from Haim and Yannick will not be delivered until Tuesday due to back log of orders because of the weather. Plan to proceed with Dacogen on Monday and will discuss oral medication on Monday.  Pt. Will need FA assessment  and possibly apply for LTD, [facility] for Sherry to meet with pt.       Electronically signed by Brenda Moore RN on 1/10/2025 at 11:46 AM

## 2025-01-13 NOTE — TELEPHONE ENCOUNTER
VALERIA HERE FOR MD VISIT & TX  Hold tx today   Transfuse 1 unit prbc and 1 unit plt now   Cbc every Monday and Thursday     Possible decatibine on Thursday if pt has the cancer pills shipped     Transfuse to keep hb > 7 and plt >10   Rv in 2 week s  MD VISIT 1/27/25 @ 10:15AM TX @ 10:30AM  AVS PRINTED W/ INSTRUCTIONS AND GIVEN TO PT ON EXIT

## 2025-01-13 NOTE — PROGRESS NOTES
Chemotherapy education complete to patient & family on SQ Dacogen C1D1 scheduled for 1/16/25.    Reviewed [facility] printed material on SQ Dacogen & reviewed frequency, possible side effects, injection site pain, pre-medications ordered & when to call the physician    Reviewed contents of chemo folder & community resources, important phone numbers to triage & on call physician    Assessment forms filled out by family & reviewed    Script for Zofran sent to patient pharmacy, [facility] per Dr. Gallego    Chemo consent signed by patient & writer    Patient & family verbalized understanding & all questions answered

## 2025-01-13 NOTE — PROGRESS NOTES
Patient Assistance    Writer met with patient, wife and daughter Deya. Deya asked about assistance for hospital bills. KIKI FAA given to daughter with instructions on how to complete and documents to gather.     Writer also [facility] via secure email.     Currently, there are no funds available with a Foundation to assist with treatment out-of-pocket expenses. If assistance becomes available, writer will contact patient's daughter.     Patient and daughter verbalized understanding and thanked writer.

## 2025-01-13 NOTE — PROGRESS NOTES
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  History was obtained through an .  Patient understand very limited English.  Patient has not seen a doctor for a while.  Was seen by primary care provider due to complaints of fatigue also having low-grade fever.  Lab workup showed significantly abnormalities including severe thrombocytopenia and anemia subsequently patient sent to the ER.   Patient denies any weight loss swollen glands.  Does complain of having viral syndrome like symptoms over the last 1 week.  Denies noticing any bleeding.  Denies cough.  Patient does not smoke.  Drinks alcohol rarely.  Bone marrow aspiration and biopsy showed erythroid leukemia with high risk cytogenetics.  Patient is admitted to the hospital and received 3+7 induction chemotherapy.  Patient tolerated chemotherapy fairly well but developed fever and diarrhea.  Bone marrow aspiration and biopsy were done on day #19 and showed persistent leukemia but down to about 20 to 30% from a baseline of 80%.  Will discuss second induction therapy versus switching to Vidaza plus venetoclax and the patient chose the second

## 2025-01-13 NOTE — PROGRESS NOTES
Patient arrives by wheelchair with family to tx area following MD visit for Dacogen C1D1 tx  Pt complaints of fatigue, SOB, pale in appearance  Labs & MD note reviewed  Hgb 6.0, Plt 8  Per Dr Gallego to hold tx today, transfuse 1 unit RBC & 1 unit Plts   Lab called to come & draw Type & screen - patient banded   PIV established per protocol #22 gauge to Rt AC with brisk blood return  Blood consent completed on 11/4/24  1 unit Platelets transfused without adverse reaction;line flushed  1 unit RBC's transfused without adverse reaction;line flushed  Patient stable for discharge  IV removed & pressure dressing applied   Pt discharged by wheelchair with family  Returns 1/16 for labs & C1 D1 Dacogen SQ

## 2025-01-13 NOTE — PATIENT INSTRUCTIONS
Hold tx today   Transfuse 1 unit prbc and 1 unit plt now   Cbc every Monday and Thursday     Possible decatibine on Thursday if pt has the cancer pills shipped     Transfuse to keep hb > 7 and plt >10   Rv in 2 week s

## 2025-01-14 NOTE — TELEPHONE ENCOUNTER
[facility]- MEDICAL NUTRITION THERAPY     Visit Type: Initial  Reason for Assessment:  RN Navigator Referral    NUTRITION RECOMMENDATIONS / MONITORING / EVALUATION  Continue soft, bland diet as tolerated.   Suggested trial of oral nutrition supplement; monitor acceptance/tolerance.   Will monitor PO tolerance, adequacy of intake, weight, and care plans. Encouraged daughter to contact writer as needed.     Subjective/Current Data:  Osbaldo Duff is a 69 y.o. 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   Referral received from patient's nurse navigator re: possible assistance with oral nutrition supplements.   Chart reviewed and called number listed in chart. Spoke with patient's daughter, Deya. Daughter reports decreased appetite and intake related to recent diarrhea/fear of eating. States diarrhea seems to be improving over past few days. Pt is currently eating soft, bland foods (ie chicken, mashed potatoes, cooked vegetables). Pt requires soft foods d/t limited amount of teeth.   Weight record reviewed: noted weight loss over past couple months.   Daughter states pt was given Ensure samples to try at last visit to [facility], but pt has not tried them yet.      Objective Data:  Patient Active Problem List    Diagnosis Date Noted    Pancytopenia (HCC) 12/04/2024    E. coli septicemia ([facility]) 12/04/2024    Agranulocytosis ([facility]) 12/03/2024    SVT (supraventricular tachycardia) ([facility]) 12/02/2024    Neutropenia with fever ([facility]) 12/02/2024    Sepsis due to Escherichia coli with acute renal failure ([facility]) 12/02/2024    Myelosuppression after chemotherapy 11/23/2024    Chronic kidney disease, stage II (mild) 11/23/2024    Hyperuricemia 11/22/2024    Acute erythroid leukemia (HCC) 11/22/2024    Admission for chemotherapy 11/22/2024    Acute leukemia not having achieved remission ([facility]) 11/21/2024    HTN (hypertension) 11/21/2024    MDS (myelodysplastic syndrome) ([facility]) 11/18/2024

## 2025-01-16 PROBLEM — G93.6 CEREBRAL EDEMA (HCC): Status: ACTIVE | Noted: 2025-01-01

## 2025-01-16 PROBLEM — R40.2430 GLASGOW COMA SCALE TOTAL SCORE 3-8 (HCC): Status: ACTIVE | Noted: 2025-01-01

## 2025-01-16 PROBLEM — R41.82 ALTERED MENTAL STATUS: Status: ACTIVE | Noted: 2025-01-01

## 2025-01-16 PROBLEM — S06.5XAA SDH (SUBDURAL HEMATOMA): Status: ACTIVE | Noted: 2025-01-01

## 2025-01-16 PROBLEM — J96.01 ACUTE RESPIRATORY FAILURE WITH HYPOXIA: Status: ACTIVE | Noted: 2025-01-01

## 2025-01-16 PROBLEM — Z51.5 ENCOUNTER FOR PALLIATIVE CARE: Status: ACTIVE | Noted: 2025-01-01

## 2025-01-16 PROBLEM — Z71.89 GOALS OF CARE, COUNSELING/DISCUSSION: Status: ACTIVE | Noted: 2025-01-01

## 2025-01-16 NOTE — ED NOTES
Pt to ED with complaints of altered mental status. Per EMS last known well was 2230 1/15/2024. Pt has a history of leukemia and is currently being treated. Per family, pt was complaining of a headache. Family gave tylenol but no relief. Pt is responsive to pain. Pt is unable to answer questions at this time. Vital signs completed. Dr. Chaidez at bedside

## 2025-01-16 NOTE — PROGRESS NOTES
[facility]  PROGRESS NOTE    Shift date: 1/15/2025  Shift day: Wednesday   Shift # 3    Room # 12/12   Name: Osbaldo Duff                Pentecostal: Faroese Oriental orthodox   Place of [place]: Unknown    Referral:  Follow-up    Admit Date & Time: 1/16/2025  3:35 AM    Assessment:  Osbaldo Duff is a 69 y.o. male in the hospital because of \"Altered Mental Status.\" Patient was found to have a \"large brain bleed.\" Patient was \"intubated\" at time of visit.     Intervention:   made follow-up visit to support family, as Neuro NP was prepared to update family on patient's condition.  assisted care team with  and was present while NP updated family and discussed code status options. Per family discussion, they would like \"everything done.\" Patient will be admitted to the hospital. Per report, patient has a \"grim prognosis.\"     Outcome:  Patient's family members were tearful and visibly upset with the news.     Plan:  Chaplains will remain available to offer spiritual and emotional support as needed.    Electronically signed by Chaplain Adrian, on 1/16/2025 at 7:34 AM.  [facility]  [phone]

## 2025-01-16 NOTE — PROGRESS NOTES
[facility]  Speech Language Pathology    Date: 1/16/2025  Patient Name: Osbaldo Duff  YOB: 1955   AGE: 69 y.o.  MRN: [id number]        Patient Not Available for Speech Therapy     Due to:  [] Testing  [] Hemodialysis  [] Cancelled by RN  [] Surgery   [x] Intubation/Sedation/Pain Medication  [] Medical instability  [] Other:    Next scheduled treatment: as medically appropriate  Completed by: Yani Gill SLP, M.S. CCC-SLP

## 2025-01-16 NOTE — CONSULTS
Palliative Care Inpatient Consult    NAME:  Osbaldo Duff  MEDICAL RECORD NUMBER:  [id number]  AGE: 69 y.o.   GENDER: male  : 1955  TODAY'S DATE:  2025    Reasons for Consultation:    Symptom and/or pain management  Provision of information regarding PC and/or hospice philosophies  Complex, time-intensive communication and interdisciplinary psychosocial support  Clarification of goals of care and/or assistance with difficult decision-making  Guidance in regards to resources and transition(s)    Members of PC team contributing to this consultation are: Yoselin May CNP     Plan      Palliative Interaction:  Patient seen on rounds.   He is intubated and unresponsive.   His daughter is present at bedside.     Introduce the palliative role and my involvement in his care.     She states they have just discussed comfort measures. She states at this time they do not want the patient removed from life support.     They appear quite overwhelmed. Understandably, they have received a lot of information today.     Believe they need time to process the current situation.     Will revisit tomorrow and further discuss goals of care moving forward.     Additional Assessments:   We feel the patient symptoms are being controlled. Their current regimen is reviewed by myself and discussed with the staff.     Goals of care evaluation   The patient goals of care are improve or maintain function/quality of life   Goals of care discussed with:    [] Patient independently    [] Patient and Family    [x] Family or Healthcare DPOA independently    [] Unable to discuss with patient, family/DPOA not present    Code Status: DNR-CCA    Palliative Care will continue to follow Mr. Duff's care as needed.     Thank you for allowing Palliative Care to participate in the care of Mr. Duff.    History of Present Illness     The patient is a 69 y.o. male with known thrombocytopenia anemia, acute erythroid leukemia. Patient

## 2025-01-16 NOTE — DISCHARGE SUMMARY
Neuro Critical Care   Discharge Summary      PATIENT NAME: Osbaldo Duff  YOB: 1955  MEDICAL RECORD NO. [id number]  DATE: 1/16/2025  PRIMARY CARE PHYSICIAN: Barbara Daley APRN - CNP  ADMISSION DATE:  1/16/25  DISCHARGE DATE:  TOD 1/16/25 1800  DISCHARGE DIAGNOSIS:   Patient Active Problem List   Diagnosis Code    Acute anemia D64.9    Thrombocytopenia ([facility]) D69.6    MDS (myelodysplastic syndrome) ([facility]) D46.9    Acute leukemia not having achieved remission ([facility]) C95.00    HTN (hypertension) I10    Hyperuricemia E79.0    Acute erythroid leukemia ([facility]) C94.00    Admission for chemotherapy Z51.11    Myelosuppression after chemotherapy D75.89, T45.1X5A    Chronic kidney disease, stage II (mild) N18.2    SVT (supraventricular tachycardia) ([facility]) I47.10    Neutropenia with fever ([facility]) D70.9, R50.81    Sepsis due to Escherichia coli with acute renal failure ([facility]) A41.51, R65.20, N17.9    Agranulocytosis ([facility]) D70.9    Pancytopenia ([facility]) D61.818    E. coli septicemia ([facility]) A41.51    Subdural hematoma S06.5XAA    Altered mental status R41.82    Cerebral edema ([facility]) G93.6    Acute respiratory failure with hypoxia J96.01    Madalyn coma scale total score 3-8 ([facility]) R40.2430    Encounter for palliative care Z51.5    Goals of care, counseling/discussion Z71.89       HOSPITAL COURSE     Osbaldo Duff is a 69 y.o. yo male with a history of erythroid leukemia (diagnosed November 2024) with anemia and thrombocytopenia who presented to [facility] on 1/16/2025  3:35 AM with AMS, headache.  LKW 1/15 around 2200 when patient complained of headache.  Wife gave him an OTC pain mediation and he went to bed.  He awoke this morning around 0330AM with nausea, vomiting and continued headache.  Family called EMS.  On EMS arrival, patient reportedly had poor level of alertness and was intermittently hypoxic.  On arrival to the ED, GCS 7.  /101.  Intubated for airway protection.  CT head without contrast showed large left  TISSUES/SKULL:  No acute abnormality of the visualized skull or soft tissues.     Large left convexity subdural hematoma with resulting 2.0 cm rightward midline shift. Critical results were called by Dr. Irvin Meyers to Dr. Chaidez On 2025 at 05:22.       DISCHARGE INSTRUCTIONS     N/A     Bisi Starr, APRN - CNP  Neuro Critical Care  2025, 6:15 PM

## 2025-01-16 NOTE — ED PROVIDER NOTES
[facility] NEURO ICU  Emergency Department Encounter  Emergency Medicine Resident     Pt Name:Osbaldo Duff  MRN: [id number]  Birthdate 1955  Date of evaluation: 1/16/25  PCP:  Barbara Daley APRN - CNP  Note Started: 4:22 AM EST      CHIEF COMPLAINT       Chief Complaint   Patient presents with    Altered Mental Status       HISTORY OF PRESENT ILLNESS  (Location/Symptom, Timing/Onset, Context/Setting, Quality, Duration, Modifying Factors, Severity.)      Osbaldo Duff is a 69 y.o. male who presents with history of acute erythroid leukemia, anemia, thrombocytopenia coming in via EMS for altered mentation.  History limited due to patient's Andorran-speaking only.  History obtained through daughter and wife at bedside.  Wife reports patient was complaining of headache around 20-30 at which time wife gave over-the-counter pain medication and patient went to bed.  Around 330 this morning patient awoke complaining of nausea and began vomiting with continued headaches.  Patient family then called EMS.  EMS reports that on arrival patient had poor mentation and was intermittently hypoxic.  Patient arrived in the emergency department altered only withdrawing to pain.  Patient was with snoring respirations however maintaining saturations.     Per chart review patient being treated with chemotherapy with last treatment 1/13/2025 at which time he was found to have a hemoglobin of 6.0 and platelets of 8.  At that time he was transfused 1 units of platelet and 1 units of packed red blood cells.    PAST MEDICAL / SURGICAL / SOCIAL / FAMILY HISTORY      has a past medical history of Acute erythroid leukemia (HCC), Anemia, Thrombocytopenia (HCC), and Wears dentures.       has a past surgical history that includes Hand surgery; Inguinal hernia repair; CT BIOPSY BONE MARROW (11/11/2024); and CT BIOPSY BONE MARROW (12/10/2024).      Social History     Socioeconomic History    Marital status:      Spouse name: Not on    Troponin, High Sensitivity 191(!!) [TF]   0840 Lactate, Sepsis(!):    Lactic Acid, Sepsis, Whole Blood 4.5(!) [TF]   0841 CT CHEST PULMONARY EMBOLISM W CONTRAST  IMPRESSION:  1. No clear evidence for pulmonary embolus within the limitations of the  study.  2. Tortuosity of the thoracic aorta.  3. Hepatic cysts measuring up to 2.7 cm.  Underlying fatty liver.  4. Mild gallbladder distension.  5. NG and endotracheal tubes as above.  6. Mild bibasilar atelectasis and respiratory motion.      [TF]   0841 XR CHEST PORTABLE  IMPRESSION:  1. Endotracheal tube tip terminates 4.8 cm above the juan luis.  2. Nasogastric tube tip terminates in the proximal stomach. The side port is  at the GE junction/distal esophagus. Recommend advancement by 5-8 cm.  3. No acute cardiopulmonary process.   [TF]   0842 CTA HEAD NECK W CONTRAST     IMPRESSION:  1. Large left convexity subdural hematoma as previously described.  2. Tiny focus of possible active extravasation at the lateral aspect of the  left convexity best appreciated on axial and coronal.  3. No significant stenosis or aneurysm of the intracranial or cervical  carotid/vertebral arteries.      [TF]   0842 CT HEAD WO CONTRAST  IMPRESSION:  Large left convexity subdural hematoma with resulting 2.0 cm rightward  midline shift.   [TF]      ED Course User Index  [TF] Rodney Patten MD       PROCEDURES:  Intubation Procedure Note    Performed by: Rodney Patten MD, DO    Indication: airway compromise and airway protection    Consent: Unable to be obtained due to the emergent nature of this procedure.    Time out performed: Immediately prior to the procedure a \"time out\" was called to verify the correct patient, the correct procedure, equipment, support staff and site/side marked as required.      Medications Used: etomidate intravenously and succinycholine intravenously    Procedure: The patient was placed in the appropriate position.  Intubation was performed by direct laryngoscopy

## 2025-01-16 NOTE — PROGRESS NOTES
[facility]  Occupational Therapy Not Seen Note    DATE: 2025    NAME: Osbaldo Duff  MRN: [id number]   : 1955      Patient not seen this date for Occupational Therapy due to:    Patient is not appropriate for active participation in OT evaluation/treatment at this time d/t intubated/SBR     Next Scheduled Treatment: check back 2025     Electronically signed by MALICK Ricks/L on 2025 at 11:34 AM

## 2025-01-16 NOTE — PROGRESS NOTES
[facility] [place]    Shift date: 1/15/2025  Shift day: Wednesday   Shift # 3    Room # 12/12   Name: Osbaldo Duff                Confucianism: Moldovan Jain   Place of [place]: Unknown    Referral:  Emergent Intubation    Admit Date & Time: 1/16/2025  3:35 AM    Assessment:  Osbaldo Duff is a 69 y.o. male in the hospital because of \"Altered Mental Status.\" Per report, patient is a \"Leukemia\" patient and was at home this evening complaining of \"vomiting\" and \"fatigue.\" Upon entering the room, writer observes patient undergoing \"intubation.\" Patient's family members were present in the ED internal waiting room, when  arrived to unit.     Intervention:   provided support to family, per ED Nurse Coordinator request. Patient's daughter and wife were present in the internal waiting room, when  visited. Patient's wife speaks Moldovan and patient's daughter is an English speaker.  introduced herself to patient's family and provided hospitality to them. Patient was taken to CT Scan and  facilitated bedside visitation for family.     Outcome:  Patient remains critical. Patient's family thanked  for support.     Plan:  Chaplains will remain available to offer spiritual and emotional support as needed.      Electronically signed by Chaplain Adrian, on 1/16/2025 at 4:33 AM.  [facility]  [phone]

## 2025-01-16 NOTE — PROGRESS NOTES
Spiritual Health History and Assessment/Progress Note  [facility]    (P) Initial Encounter, Follow up,  ,      Name: Osbaldo Duff MRN: [id number]    Age: 69 y.o.     Sex: male   Language: Egyptian   Quaker: Faith   Subdural hematoma     Date: 1/16/2025            Total Time Calculated: (P) 10 min              Spiritual Assessment began in [facility] NEUR[facility] ICU        Referral/Consult From: (P) Nurse   Encounter Overview/Reason: (P) Initial Encounter  Service Provided For: (P) Family    [place], Belief, Meaning:   Patient unable to assess at this time  Family/Friends Other: unable to assess      Importance and Influence:  Patient unable to assess at this time  Family/Friends Other: unable to assess    Community:  Patient feels well-supported. Support system includes: Spouse/Partner and Children  Family/Friends feel well-supported. Support system includes: Spouse/Partner, Parent/s, and Children    Assessment and Plan of Care:     Patient Interventions include: Other: n/a  Family/Friends Interventions include: Facilitated expression of thoughts and feelings    Patient Plan of Care: Other: follow up as needed  Family/Friends Plan of Care: Other: continue to visit as needed     01/16/25 1352   Encounter Summary   Encounter Overview/Reason Initial Encounter   Service Provided For Family   Referral/Consult From Nurse   Support System Spouse;Children   Last Encounter  01/16/25   Complexity of Encounter Moderate   Begin Time 1330   End Time  1340   Total Time Calculated 10 min   Spiritual/Emotional needs   Type Spiritual Support   Assessment/Intervention/Outcome   Assessment Calm;Sad;Tearful   Intervention Active listening   Outcome Coping       Electronically signed by Chaplain Nat Intern on 1/16/2025 at 1:55 PM

## 2025-01-16 NOTE — H&P
Neuro ICU History & Physical    Patient Name: Osbaldo Duff  Patient : 1955  Room/Bed:   Code Status: Full  Allergies: No Known Allergies    CHIEF COMPLAINT     Unresponsive, spontaneous left subdural hematoma     HPI    History Obtained From: wife and daughter via / EMR     The patient is a 69 y.o. male with significant past medical history of thrombocytopenia anemia with diagnosis of acute erythroid leukemia 2024.  Patient sees Dr. Gallego is actively undergoing treatments has received 3+7 chemotherapy induction unfortunately with residual disease.  Patient had went for a treatment on 2025 and was found to have hgb of 6.0 and ply of 8 patient was transfused 1 unit of platelet and 1 unit of PRBC. Per family patient had been feeling fatigue not any differently than he has been, at approximately 2230 patient complained of a head his wife gave him an over the counter pain reliever and patient went to bed, at around 3:30 this morning patient complained of nausea and vomiting and continued headache. Patient arrived to the ED with altered mental status per ER staff patient was only responding to painful stimuli, decision was made to intubate patient for airway protection and patient was taken to CT stat, CT of the brain demonstrated large left convexity subdural hematoma with resulting 2 cm rightward midline shift.  Patient was give TXA and DDAVP.  On initial exam patient's pupils are left 6 nonreactive right 2-3 and sluggish.  Patient withdrew to pain right upper extremity triple flexion bilateral lowers with extensor posturing of the left upper.  Extensive conversation was had with family via  regarding patient's condition at this time CODE STATUS was also addressed and family wishes the patient remain a full code I did explain to family at this time given all of our attempts to help the patient that his ultimate prognosis is very grave.  In the time.  Of having  to SDH.    DISPOSITION: Admit to the Neuro ICU for close neurological monitoring, vent management.    At least 30min of critical care time spent at patient bedside examining patient,reviewing the images personally, speaking with the nursing staff and family regarding recommendations.    AL Ledesma - CNP  Neuro Critical Care  01/16/25 2:31 PM

## 2025-01-16 NOTE — PLAN OF CARE
Spoke with patient's wife and daughter at bedside this afternoon.  Family confirms they do not wish to proceed with surgery.  Patient currently DNRCC-A.  They understand that patient likely will not survive without surgical intervention.  Spoke with family regarding goals of care going forward.  Discussed the option of continuing DNR CCA versus changing CODE STATUS to DNRCC and proceeding with comfort care.  Explained the process of terminal extubation.  At this time, family would like to keep patient DNR CCA.  They do not want to remove the ET tube/ventilator.  However, they do not wish for any further labs, line placement, blood product or escalation of care as they understand it will not change patient's overall prognosis.  Agreeable to palliative care consultation.  Conversation held via .  Emotional support was provided.      AL Ledesma - CNP  Neuro Critical Care  01/16/25 6245

## 2025-01-16 NOTE — PROGRESS NOTES
Neurosurgery PEG/Resident    Daily Progress Note   CC:  Chief Complaint   Patient presents with    Altered Mental Status     1/16/2025  9:57 AM    Chart reviewed.   Patient admitted with acute spontaneous left side subdural hematoma with 2cm left to right shift    He was given 3 units platelets given, DDAVP, Txa, 3% hypertonic saline 250cc bolus  He remains intubated on 10mcg Levophed    Vitals:    01/16/25 0813 01/16/25 0852 01/16/25 0915 01/16/25 0930   BP:  105/64 (!) 96/49 (!) 110/48   Pulse: (!) 109 (!) 101 92 (!) 105   Resp: 22 22 22 24   Temp:  97.3 °F (36.3 °C)     TempSrc:       SpO2: 100% 100%         PE:   Intubated without sedation  Left pupil 5 mm nonreactive  Right pupil 3 mm nonreactive  Positive cough and corneal bilateral   Extensor posturing BUE  Not following commands    Lab Results   Component Value Date    WBC 11.0 01/16/2025    HGB 6.2 (LL) 01/16/2025    HCT 18.5 (L) 01/16/2025    PLT See Reflexed IPF Result 01/16/2025    ALT 17 01/16/2025    AST 17 01/16/2025     01/16/2025    K 4.1 01/16/2025     01/16/2025    CREATININE 1.1 01/16/2025    BUN 21 01/16/2025    CO2 18 (L) 01/16/2025    INR 1.5 01/16/2025    CRP 7.2 (H) 01/16/2025       Radiology   CT CHEST PULMONARY EMBOLISM W CONTRAST    Result Date: 1/16/2025  EXAMINATION: CTA OF THE CHEST 1/16/2025 3:40 am TECHNIQUE: CTA of the chest was performed after the administration of intravenous contrast.  Multiplanar reformatted images are provided for review.  MIP images are provided for review. Automated exposure control, iterative reconstruction, and/or weight based adjustment of the mA/kV was utilized to reduce the radiation dose to as low as reasonably achievable. COMPARISON: Portable chest from 01/16/2025 HISTORY: ORDERING SYSTEM PROVIDED HISTORY: AMS, Hx of Leukemia TECHNOLOGIST PROVIDED HISTORY: AMS, Hx of Leukemia Additional Contrast?->1 69-year-old male with altered mental status; history of leukemia FINDINGS: Pulmonary

## 2025-01-16 NOTE — ED PROVIDER NOTES
[facility]     Emergency Department     Faculty Attestation    I performed a history and physical examination of the patient and discussed management with the resident. I have reviewed and agree with the resident’s findings including all diagnostic interpretations, and treatment plans as written. Any areas of disagreement are noted on the chart. I was personally present for the key portions of any procedures. I have documented in the chart those procedures where I was not present during the key portions. I have reviewed the emergency nurses triage note. I agree with the chief complaint, past medical history, past surgical history, allergies, medications, social and family history as documented unless otherwise noted below. Documentation of the HPI, Physical Exam and Medical Decision Making performed by rochelleibcarline is based on my personal performance of the HPI, PE and MDM. For Physician Assistant/ Nurse Practitioner cases/documentation I have personally evaluated this patient and have completed at least one if not all key elements of the E/M (history, physical exam, and MDM). Additional findings are as noted.    Note Started: 3:55 AM EST     70 yo M, altered mentation, hx leukemia, c/o ha last night around 7P, no injury,   PE tachy, withdraws to pain, vomit, AISSATOU,   Radial pulse = d pedal pulse = / minimal petechia to lower extremity,     EKG Interpretation    Interpreted by me  Sinus tachycardia, hr 132, no ischemia, pvc, normal axis, qtc 453    CRITICAL CARE: There was a high probability of clinically significant/life threatening deterioration in this patient's condition which required my urgent intervention.  Total critical care time was 15 minutes.  This excludes any time for separately reportable procedures.       Ej Khan DO  01/16/25 0357       Ej Chaidez DO  01/16/25 0419

## 2025-01-16 NOTE — PROGRESS NOTES
Date: 1/16/2025  Time: 0353  Patient identity confirmed:  Yes  Indications: Airway Protection  Preoxygenation: yes      Laryngoscope size and type Glidescope  Airway introducer used: No  Evac: No  ETT size:a 7.5 cuffed  Number of attempts:1   Cords visualized:  [x] Clearly  [] Poorly  Breath sounds present bilaterally: Yes   ETCO2   [x] Positive   ETT secured at  24 at teeth    ETT secured with Jam  Chest x-ray ordered: Yes     Difficult airway:    No       If yes, was red tape placed around ETT:   No    Was this a Code Situation:    No             BP: (!) 169/76        Procedure performed by: Dr. Sandor Núñez RCP  4:13 AM

## 2025-01-16 NOTE — PROGRESS NOTES
Code status discussion:    Staff had a long discussion with the patient's daughter and wife in person, in presence of the RN(s). Patient's current condition, laboratory and radiographic findings as well as recommendations of physicians consulted on the case were discussed with the patient's family in detail in simple English via .  All questions and concerns of the family were addressed, and appropriate emotional support was provided. After understanding the patient's current medical condition, family decided that they wanted the patient's code status be changed to DNRCCA, which was witnessed by the RN(s).   The family's wishes will be honored and we have changed the patient's code status to DNRCCA.    This conversation was completed with the assistance of video . Wife speaks [place]. Daughter speaks English.     Mariah Cespedes DO   Emergency Medicine Resident  1/16/2025  11:04 AM EST

## 2025-01-16 NOTE — SIGNIFICANT EVENT
Neurosurgery update    I was paged by the nurse as the family does not want to proceed with surgery   I did speak with them regarding this and they do NOT want to proceed with OR  They would like to keep patient DNRCCA at this time    Electronically signed by AL Brown NP on 1/16/2025 at 12:11 PM

## 2025-01-16 NOTE — DEATH NOTES
Death Pronouncement Note  Patient's Name: Osbaldo Duff   Patient's YOB: 1955  MRN Number: [id number]    Admitting Provider: Tana Gifford MD  Attending Provider: Tana Gifford MD    Patient was examined and the following were absent: Pulses, Blood Pressure, and Respiratory effort    Patient was declared dead on 1/16/2025 at 6:00 PM by 2 registered nurses.  Writer confirmed.    Preliminary Cause of Death: SDH (subdural hematoma)     Electronically signed by AL Ledesma CNP on 1/16/25 at 6:14 PM EST

## 2025-01-16 NOTE — CONSULTS
Department of Neurosurgery                                            Nurse Practitioner Consult Note      Reason for Consult:  Left SDH   Requesting Physician:  Sandor   Neurosurgeon:   [x] Dr. Samuels  [] Dr. Finch  [] Dr. Fiore  [] Dr. Lock    Neurosurgery notified of consult 0500  Neurosurgery arrival to bedside @0500    History Obtained From:  spouse, family member - daughter, wife via      CHIEF COMPLAINT:         Chief Complaint   Patient presents with    Altered Mental Status       HISTORY OF PRESENT ILLNESS:       The patient is a 69 y.o. 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  Patient sees Dr. Gallego is actively undergoing treatments has received 3+7 chemotherapy induction unfortunately with residual disease.  Patient had went for a treatment on 1/13/2025 and was found to have hgb of 6.0 and ply of 8 patient was transfused 1 unit of platelet and 1 unit of PRBC. Per family patient had been feeling fatigue not any differently than he has been, at approximately 2230 patient complained of a head his wife gave him an over the counter pain reliever and patient went to bed, at around 3:30 this morning patient complained of nausea and vomiting and continued headache. Patient arrived to the ED with altered mental status per ER staff patient was only responding to painful stimuli, decision was made to intubate patient for airway protection and patient was taken to CT stat, CT of the brain demonstrated large left convexity subdural hematoma with resulting 2 cm rightward midline shift.  Patient was given 2 pack platelet, TXA and DDAVP.  On initial exam patient's pupils are left 6 nonreactive right 2-3 and sluggish.  Patient withdrew to pain right upper extremity triple flexion bilateral lowers with extensor posturing of the left upper.  Extensive conversation was had with family via   infusion, 1-100 mcg/min, IntraVENous, Continuous    REVIEW OF SYSTEMS:       CONSTITUTIONAL: Positive  for fatigue and malaise   EYES: negative for double vision and photophobia    HEENT: negative for tinnitus and sore throat   RESPIRATORY: negative for cough, shortness of breath   CARDIOVASCULAR: negative for chest pain, palpitations   GASTROINTESTINAL: negative for nausea, vomiting   GENITOURINARY: negative for incontinence   MUSCULOSKELETAL: negative for neck or back pain   NEUROLOGICAL: negative for seizures   PSYCHIATRIC: negative for agitated     Review of systems otherwise negative.    PHYSICAL EXAM:       /67   Pulse 94   Temp 98.2 °F (36.8 °C) (Bladder)   Resp 22   SpO2 100%       CONSTITUTIONAL: no apparent distress, well developed, well nourished, alert and oriented x 3, in no acute distress. No dysarthria, no aphasia. EOMI.    HEAD: normocephalic, atraumatic   EYES: PERRLA, EOMI, no lateral or horizontal nystagmus bilaterally    ENT: moist mucous membranes, uvula midline   NECK: supple, symmetric, no midline tenderness to palpation   BACK: without midline tenderness, step-offs or deformities   LUNGS: clear to auscultation bilaterally   CARDIOVASCULAR: regular rate and rhythm   ABDOMEN: Soft, non-tender, non-distended with normal active bowel sounds   NEUROLOGIC:  On initial exam patient's pupils are left 6 nonreactive right 2-3 and sluggish. Patient withdrew to pain right upper extremity triple flexion bilateral lowers with extensor posturing of the left upper.   Second exam neurological change with bilateral blown pupils extensor posturing of bilateral uppers and triple flexion of lowers he continued to have a cough and gag      SKIN: no rash on exposed skin       LABS AND IMAGING:     CBC with Differential:    Lab Results   Component Value Date/Time    WBC 11.0 01/16/2025 04:12 AM    RBC 2.15 01/16/2025 04:12 AM    HGB 6.2 01/16/2025 04:12 AM    HCT 18.5 01/16/2025 04:12 AM    PLT See

## 2025-01-16 NOTE — SIGNIFICANT EVENT
Notified by RN that patient is increasingly tachycardic and hypertensive. On evaluation at the bedside, patient remains intubated and off sedation.  Heart rate 160s, .  On Cardene 10 mg/hour.  Repeat blood pressure taken, SBP 100s; Cardene stopped.  EKG confirmed SVT.  1LNS bolus initiated.  On exam, pupils ~5mm bilaterally and non-reactive.  Absent cough/gag/corneal reflexes.  No movement to pain.  Patient currently DNR-CCA and family had agreed they did not want to escalate care earlier.  Discussed patient's status with patient's wife and daughter at bedside.  Explained in simple terms and patient is becoming hemodynamically unstable likely secondary to increased intracranial pressure.  Discussed options for care going forward. Family elected against escalation of care.  They understand patient will die without initiating vasopressor support and other measures and that he could die despite maximum medical management.  Code status changed to DNR-CC.  Comfort care medications ordered.  Family does not want to terminally extubate yet.  If patient makes it through the night, we will discuss terminal extubation further 1/17 AM.  Emotional support was provided.  All communication was made through a .     AL Ledesma - CNP  Neuro Critical Care  01/16/25 5:06 PM

## 2025-01-16 NOTE — CONSULTS
Eryn Richard, Fidel, Gonzalez, Rick, Madai, Henri, & Jorge  Urology Consultation      Patient:  Osbaldo Dfuf  MRN: [id number]  YOB: 1955    REASON FOR CONSULT:  Hematuria    HISTORY OF PRESENT ILLNESS:   The patient is a 69 y.o. male who presented to ED early this am for AMS. He was recently diagnosed with acute erythroid leukemia, anemia, and thrombocytopenia. He was intubated to protect his airway as patient was starting to vomit. He underwent CT head showing large left subdural hematoma with midline shift, hemoglobin 6.0, platelets 6. Concern for active brain herniation. Hassan was placed due to critical care status. Urology consulted for hematuria that was noted after hassan placement. Patient seen & examined bedside while he is intubated/sedated. Daughter and wife at bedside. Daughter is able to tell me he has never seen a urologist before, has never had blood in his urine, no history of kidney stones, UTIs, or  malignancy. He does have history of inguinal hernia repair. No Smoking history.  UA positive for moderate hgb, 20-50 rbc, 5-10 wbc, no bacteria.   Creatinine 1.1 which is patient's baseline.   Hgb 6.2, WBC 11.     Patient's old records, notes and chart reviewed and summarized above.    Past Medical History:    Past Medical History:   Diagnosis Date    Acute erythroid leukemia (HCC) 11/22/2024    Anemia     Thrombocytopenia (HCC)     Wears dentures        Past Surgical History:    Past Surgical History:   Procedure Laterality Date    CT BIOPSY BONE MARROW  11/11/2024    CT BONE MARROW BIOPSY 11/11/2024 [facility] CT SCAN    CT BIOPSY BONE MARROW  12/10/2024    CT BONE MARROW BIOPSY 12/10/2024 [facility] CT SCAN    HAND SURGERY      INGUINAL HERNIA REPAIR         Medications:      Current Facility-Administered Medications:     ondansetron (ZOFRAN) 4 MG/2ML injection, , , , Bisi Starr APRN - CNP    0.9 % sodium chloride infusion, , IntraVENous, PRN, Bisi Starr APRN - CNP    0.9 %  bilaterally  Hassan catheter in place 16fr, draining pink, translucent urine.     Labs:  Recent Labs     01/13/25  1141 01/16/25  0412   WBC 10.2 11.0   HGB 6.0* 6.2*   HCT 17.0* 18.5*   MCV 85.0 86.0   PLT See Reflexed IPF Result See Reflexed IPF Result     Recent Labs     01/13/25  1141 01/16/25  0340 01/16/25  0412   *  --  137   K 3.9  --  4.1     --  105   CO2 17*  --  18*   PHOS 3.2  --   --    BUN 19  --  21   CREATININE 1.0 1.1 1.1       Recent Labs     01/16/25  0439   COLORU Yellow   PHUR 5.0   WBCUA 5 TO 10   RBCUA 20 TO 50   BACTERIA None   LEUKOCYTESUR NEGATIVE   UROBILINOGEN Normal   BILIRUBINUR NEGATIVE       Culture Results:  Pending  -----------------------------------------------------------------  Imaging Results:        Assessment and Plan   Impression:  68 yo male with Subdural hematoma  -Acute erythroid leukemia  -Acute on chronic anemia  -Thrombocytopenia     problem list -   -New onset gross hematuria    Plan:   -Maintain hassan catheter - monitor for any worsening hematuria. If clots obstruct catheter please notify urology. Possibly will need transitioned to 22Fr 3 way for possible CBI if hassan not draining or clot formation  -Bladder scan if concern for obstruction or hassan not draining as well  -Follow up urine cx  -Monitor Cr, if increasing will need  imaging   -Will require outpatient follow up for further hematuria workup  -Discussed with wife and daughter bedside who verbalize understanding      Thank you for involving us in the care of Osbaldo Duff. Should you have any questions, please do not hesitate to contact us at any time.      Sandra Agudelo PA-C  Urology Service   9:39 AM 1/16/2025

## 2025-01-16 NOTE — PROGRESS NOTES
The transport originated from ER12. Pt. was transported to 120. Assisting with the transport was RN _ RT.  Appropriate devices were applied to monitor the patient's condition during transport. Patient transported  via 40% O2 via ventilator. Patient tolerated well.        JENSEN JONES RCP  8:21 AM

## 2025-01-16 NOTE — PROGRESS NOTES
Physical Therapy        Physical Therapy Cancel Note      DATE: 2025    NAME: Osbaldo Duff  MRN: [id number]   : 1955      Patient not seen this date for Physical Therapy due to:    Other: pt not appropriate for PT evaluation at this time.       Electronically signed by Yamini Green PT on 2025 at 11:36 AM

## 2025-01-17 LAB
ABO/RH: NORMAL
ANTIBODY SCREEN: NEGATIVE
ARM BAND NUMBER: NORMAL
BLOOD BANK BLOOD PRODUCT EXPIRATION DATE: NORMAL
BLOOD BANK DISPENSE STATUS: NORMAL
BLOOD BANK ISBT PRODUCT BLOOD TYPE: 5100
BLOOD BANK ISBT PRODUCT BLOOD TYPE: 6200
BLOOD BANK PRODUCT CODE: NORMAL
BLOOD BANK SAMPLE EXPIRATION: NORMAL
BLOOD BANK UNIT TYPE AND RH: NORMAL
BPU ID: NORMAL
COMPONENT: NORMAL
CROSSMATCH RESULT: NORMAL
EKG ATRIAL RATE: 136 BPM
EKG ATRIAL RATE: 163 BPM
EKG P AXIS: 80 DEGREES
EKG P-R INTERVAL: 168 MS
EKG Q-T INTERVAL: 274 MS
EKG Q-T INTERVAL: 306 MS
EKG QRS DURATION: 76 MS
EKG QRS DURATION: 78 MS
EKG QTC CALCULATION (BAZETT): 452 MS
EKG QTC CALCULATION (BAZETT): 453 MS
EKG R AXIS: 54 DEGREES
EKG R AXIS: 72 DEGREES
EKG T AXIS: 68 DEGREES
EKG T AXIS: 81 DEGREES
EKG VENTRICULAR RATE: 132 BPM
EKG VENTRICULAR RATE: 164 BPM
MICROORGANISM SPEC CULT: NO GROWTH
SPECIMEN DESCRIPTION: NORMAL
TRANSFUSION STATUS: NORMAL
UNIT DIVISION: 0
UNIT ISSUE DATE/TIME: NORMAL

## 2025-01-17 PROCEDURE — 93010 ELECTROCARDIOGRAM REPORT: CPT | Performed by: INTERNAL MEDICINE

## 2025-01-17 NOTE — PROGRESS NOTES
Writer has completed packet required for patient expiration. Aydin was contacted and family is still considering choices regarding  homes. Patient is not a 's case and was released. [facility] notified.Family taking time to grieve.

## 2025-01-17 NOTE — ADT AUTH CERT
Brooklyn Bhandari RN    01/16/2025 0618 EST DESMOpressin (DDAVP) 28.56 mcg in sodium chloride 0.9 % 50 mL IVPB 28.56 mcg IntraVENous New Bag Matthieu Hoover RN    01/16/2025 0543 EST labetalol (NORMODYNE;TRANDATE) injection 10 mg 10 mg IntraVENous Given Matthieu Hoover RN    01/16/2025 0558 EST niCARdipine (CARDENE) 20 mg in 0.9 % sodium chloride 200 mL solution 0 mg/hr IntraVENous Held Matthieu Hoover RN    01/16/2025 0618 EST tranexamic acid-NaCl IVPB premix 1,000 mg 0 mg IntraVENous Stopped Matthieu Hoover RN    01/16/2025 0559 EST tranexamic acid-NaCl IVPB premix 1,000 mg 1,000 mg IntraVENous New Bag Matthieu Hoover RN    01/16/2025 0559 EST mannitol 20 % IVPB 71.4 g 0 g IntraVENous Held Matthieu Hoover RN    01/16/2025 1514 EST sodium chloride flush 0.9 % injection 5-40 mL 10 mL IntraVENous Given Brooklyn Bhandari RN    01/16/2025 1413 EST acetaminophen (TYLENOL) tablet 650 mg 650 mg Oral Given Brooklyn Bhandari RN    01/16/2025 1413 EST acetaminophen (TYLENOL) suppository 650 mg -- Rectal See Alternative Brooklyn Bhandari RN    01/16/2025 1631 EST 0.9 % sodium chloride infusion -- IntraVENous Stopped Brooklyn Bhandari RN    01/16/2025 1613 EST 0.9 % sodium chloride infusion -- IntraVENous Rate/Dose Change Brooklyn Bhandari RN    01/16/2025 1210 EST 0.9 % sodium chloride infusion -- IntraVENous New Bag Brooklyn Bhandari RN    01/16/2025 0650 EST norepinephrine (LEVOPHED) 16 mg in sodium chloride 0.9 % 250 mL infusion 10 mcg/min IntraVENous Rate/Dose Change Matthieu Hoover RN    01/16/2025 0611 EST norepinephrine (LEVOPHED) 16 mg in sodium chloride 0.9 % 250 mL infusion 5 mcg/min IntraVENous Rate/Dose Change Matthieu Hoover RN    01/16/2025 0606 EST norepinephrine (LEVOPHED) 16 mg in sodium chloride 0.9 % 250 mL infusion 10 mcg/min IntraVENous Rate/Dose Change Matthieu Hoover RN    01/16/2025 0603 EST norepinephrine (LEVOPHED) 16 mg in sodium chloride 0.9 % 250 mL infusion 15 mcg/min  -- SubCUTAneous Not Given Brooklyn Bhandari RN

## 2025-01-17 NOTE — PLAN OF CARE
Problem: Safety - Medical Restraint  Goal: Remains free of injury from restraints (Restraint for Interference with Medical Device)  Description: INTERVENTIONS:  1. Determine that other, less restrictive measures have been tried or would not be effective before applying the restraint  2. Evaluate the patient's condition at the time of restraint application  3. Inform patient/family regarding the reason for restraint  4. Q2H: Monitor safety, psychosocial status, comfort, nutrition and hydration  Outcome: Completed  Flowsheets (Taken 1/16/2025 4654 by Matthieu Hoover, RN)  Remains free of injury from restraints (restraint for interference with medical device):   Determine that other, less restrictive measures have been tried or would not be effective before applying the restraint   Evaluate the patient's condition at the time of restraint application   Inform patient/family regarding the reason for restraint   Every 2 hours: Monitor safety, psychosocial status, comfort, nutrition and hydration     Problem: Respiratory - Adult  Goal: Achieves optimal ventilation and oxygenation  1/16/2025 1901 by Brooklyn Bhandari, RN  Outcome: Completed     Problem: Discharge Planning  Goal: Discharge to home or other facility with appropriate resources  Outcome: Completed     Problem: Safety - Adult  Goal: Free from fall injury  Outcome: Completed

## 2025-01-17 NOTE — PROGRESS NOTES
SP[facility] [place] [facility]   Patient Death Note  DEATH   Shift date: 2025    Shift day: Thursday  Shift #: 2                 Room # 0120/0120-01   Name: Osbaldo Duff            Age: 69 y.o.  Gender: male          Rastafarian: Mandaeism      Place of Christian: un[facility]  Admit Date & Time: 2025  3:35 AM     Referral: nurse   Actual date of death: 2025   TOD: 1800        SITUATION AT DEATH:  Patient was admitted today due to \"subdural hematoma\" and was pronounced  at 1800 with daughter and spouse at bedside.    IS THIS A 'S CASE?  No    SPIRITUAL/EMOTIONAL INTERVENTION:   extended condolescence to family and with family's approval provided bedside prayer for the . Assisted with release of body form.  As family is unsure with  service providers, at their request  provided family with local crematories.     Family Received Grief Packet?  No  To be mailed       NAME AND PHONE NUMBER OF DOCTOR SIGNING DEATH CERTIFICATE:  (full name)      (phone)        NOTE:  Death occurred on [address]     for death certificate is Chuyita Parra, Unit Manager [address] / [email or web address]     are now contacting the  home after a patient death.  spoke to/left message for  ( home) indicating family's choice for their services.  called  home on  (date) at  (time).      Home not contacted as family requested time to select provider for  arrangements.  Daughter was provided with number to [facility] in order to notify [facility] when  home as been selected.      Copy of COMPLETED Release of Body Form Received?  Yes    Patient's belongings: with family     HOME:  Name: To Be Determined  City:   Phone Number:     NEXT OF KIN:  Name: Deya Delgado  Relationship: daughter  Street Address: [address] [place] [address]   City: [place]  State: [place]  Zip code: [id number]   Phone Number: [phone]    ANY FOLLOW-UP NEEDED?   No    IF SO, WHAT?  N/a    Electronically signed by EVELINA GALLOWAY,Chaplain Resident, on 1/16/2025 at 7:01 PM.  [facility]  [phone]    01/16/25 1854   Encounter Summary   Encounter Overview/Reason Grief, Loss, and Adjustments   Service Provided For Family   Referral/Consult From Nurse   Support System Spouse;Children   Last Encounter  01/16/25   Complexity of Encounter High   Begin Time 1813   End Time  1900   Total Time Calculated 47 min   Grief, Loss, and Adjustments   Type Death   Assessment/Intervention/Outcome   Assessment Tearful;Sad   Intervention Active listening;Prayer (assurance of)/[place];Sustaining Presence/Ministry of presence;Grief Care   Outcome Engaged in conversation;Expressed Gratitude;Receptive

## 2025-01-19 LAB
MICROORGANISM SPEC CULT: NORMAL
MICROORGANISM SPEC CULT: NORMAL
SERVICE CMNT-IMP: NORMAL
SERVICE CMNT-IMP: NORMAL
SPECIMEN DESCRIPTION: NORMAL
SPECIMEN DESCRIPTION: NORMAL
